# Patient Record
Sex: FEMALE | Race: WHITE | Employment: FULL TIME | URBAN - METROPOLITAN AREA
[De-identification: names, ages, dates, MRNs, and addresses within clinical notes are randomized per-mention and may not be internally consistent; named-entity substitution may affect disease eponyms.]

---

## 2017-08-07 ENCOUNTER — OFFICE VISIT (OUTPATIENT)
Dept: OBGYN CLINIC | Age: 27
End: 2017-08-07

## 2017-08-07 VITALS
BODY MASS INDEX: 34.16 KG/M2 | SYSTOLIC BLOOD PRESSURE: 108 MMHG | HEART RATE: 87 BPM | TEMPERATURE: 99.2 F | WEIGHT: 199 LBS | DIASTOLIC BLOOD PRESSURE: 62 MMHG

## 2017-08-07 DIAGNOSIS — R87.810 CERVICAL HIGH RISK HUMAN PAPILLOMAVIRUS (HPV) DNA TEST POSITIVE: ICD-10-CM

## 2017-08-07 DIAGNOSIS — E66.9 OBESITY (BMI 30-39.9): ICD-10-CM

## 2017-08-07 DIAGNOSIS — Z87.410 HISTORY OF CERVICAL DYSPLASIA: ICD-10-CM

## 2017-08-07 DIAGNOSIS — Z12.4 PAP SMEAR FOR CERVICAL CANCER SCREENING: ICD-10-CM

## 2017-08-07 DIAGNOSIS — Z97.5 IUD (INTRAUTERINE DEVICE) IN PLACE: ICD-10-CM

## 2017-08-07 DIAGNOSIS — Z01.419 WOMEN'S ANNUAL ROUTINE GYNECOLOGICAL EXAMINATION: Primary | ICD-10-CM

## 2017-08-07 PROCEDURE — 99395 PREV VISIT EST AGE 18-39: CPT | Performed by: OBSTETRICS & GYNECOLOGY

## 2017-08-07 RX ORDER — M-VIT,TX,IRON,MINS/CALC/FOLIC 27MG-0.4MG
1 TABLET ORAL DAILY
COMMUNITY
End: 2019-11-05

## 2017-08-07 ASSESSMENT — PATIENT HEALTH QUESTIONNAIRE - PHQ9
1. LITTLE INTEREST OR PLEASURE IN DOING THINGS: 0
2. FEELING DOWN, DEPRESSED OR HOPELESS: 0
SUM OF ALL RESPONSES TO PHQ QUESTIONS 1-9: 0
SUM OF ALL RESPONSES TO PHQ9 QUESTIONS 1 & 2: 0

## 2017-08-07 ASSESSMENT — ENCOUNTER SYMPTOMS
VOMITING: 0
SHORTNESS OF BREATH: 0
ABDOMINAL PAIN: 0
NAUSEA: 0
ABDOMINAL DISTENTION: 0
CONSTIPATION: 0
DIARRHEA: 0

## 2018-08-08 ENCOUNTER — OFFICE VISIT (OUTPATIENT)
Dept: OBGYN CLINIC | Age: 28
End: 2018-08-08

## 2018-08-08 VITALS
WEIGHT: 202.25 LBS | DIASTOLIC BLOOD PRESSURE: 72 MMHG | HEART RATE: 71 BPM | HEIGHT: 64 IN | TEMPERATURE: 98.1 F | BODY MASS INDEX: 34.53 KG/M2 | SYSTOLIC BLOOD PRESSURE: 118 MMHG

## 2018-08-08 DIAGNOSIS — R87.810 CERVICAL HIGH RISK HUMAN PAPILLOMAVIRUS (HPV) DNA TEST POSITIVE: ICD-10-CM

## 2018-08-08 DIAGNOSIS — Z87.410 HISTORY OF CERVICAL DYSPLASIA: ICD-10-CM

## 2018-08-08 DIAGNOSIS — E66.9 OBESITY (BMI 30-39.9): ICD-10-CM

## 2018-08-08 DIAGNOSIS — Z12.4 PAP SMEAR FOR CERVICAL CANCER SCREENING: ICD-10-CM

## 2018-08-08 DIAGNOSIS — Z97.5 IUD (INTRAUTERINE DEVICE) IN PLACE: ICD-10-CM

## 2018-08-08 DIAGNOSIS — Z01.419 WOMEN'S ANNUAL ROUTINE GYNECOLOGICAL EXAMINATION: Primary | ICD-10-CM

## 2018-08-08 PROCEDURE — 99395 PREV VISIT EST AGE 18-39: CPT | Performed by: OBSTETRICS & GYNECOLOGY

## 2018-08-08 ASSESSMENT — ENCOUNTER SYMPTOMS
VOMITING: 0
SHORTNESS OF BREATH: 0
DIARRHEA: 0
CONSTIPATION: 0
NAUSEA: 0
ABDOMINAL PAIN: 0
ABDOMINAL DISTENTION: 0

## 2018-08-08 NOTE — PROGRESS NOTES
Last PAP was normal; August/2017. Abnormal pap history?  yes  Last HPV screen: 2014 positive  Patient has never had a mammogram.  Last Dexa Scan: N/A   Contraceptive method: IUD mirena  No prior colonoscopy
Encounter   Procedures    PAP SMEAR     Follow up prn and 1 year.          Shae Liu, DO

## 2019-04-09 ENCOUNTER — TELEPHONE (OUTPATIENT)
Dept: ORTHOPEDIC SURGERY | Age: 29
End: 2019-04-09

## 2019-04-10 ENCOUNTER — TELEPHONE (OUTPATIENT)
Dept: ORTHOPEDIC SURGERY | Age: 29
End: 2019-04-10

## 2019-05-06 ENCOUNTER — OFFICE VISIT (OUTPATIENT)
Dept: FAMILY MEDICINE CLINIC | Age: 29
End: 2019-05-06
Payer: COMMERCIAL

## 2019-05-06 VITALS
TEMPERATURE: 98.4 F | HEART RATE: 72 BPM | DIASTOLIC BLOOD PRESSURE: 60 MMHG | WEIGHT: 201 LBS | BODY MASS INDEX: 34.23 KG/M2 | SYSTOLIC BLOOD PRESSURE: 102 MMHG

## 2019-05-06 DIAGNOSIS — Z00.00 PHYSICAL EXAM: Primary | ICD-10-CM

## 2019-05-06 LAB
A/G RATIO: 1.6 (ref 1.1–2.2)
ALBUMIN SERPL-MCNC: 4.4 G/DL (ref 3.4–5)
ALP BLD-CCNC: 74 U/L (ref 40–129)
ALT SERPL-CCNC: 9 U/L (ref 10–40)
ANION GAP SERPL CALCULATED.3IONS-SCNC: 15 MMOL/L (ref 3–16)
AST SERPL-CCNC: 13 U/L (ref 15–37)
BASOPHILS ABSOLUTE: 0 K/UL (ref 0–0.2)
BASOPHILS RELATIVE PERCENT: 0.7 %
BILIRUB SERPL-MCNC: 1.1 MG/DL (ref 0–1)
BUN BLDV-MCNC: 11 MG/DL (ref 7–20)
CALCIUM SERPL-MCNC: 9.5 MG/DL (ref 8.3–10.6)
CHLORIDE BLD-SCNC: 106 MMOL/L (ref 99–110)
CHOLESTEROL, TOTAL: 160 MG/DL (ref 0–199)
CO2: 21 MMOL/L (ref 21–32)
CREAT SERPL-MCNC: 0.7 MG/DL (ref 0.6–1.1)
EOSINOPHILS ABSOLUTE: 0.1 K/UL (ref 0–0.6)
EOSINOPHILS RELATIVE PERCENT: 1.9 %
GFR AFRICAN AMERICAN: >60
GFR NON-AFRICAN AMERICAN: >60
GLOBULIN: 2.7 G/DL
GLUCOSE BLD-MCNC: 93 MG/DL (ref 70–99)
HCT VFR BLD CALC: 41.9 % (ref 36–48)
HDLC SERPL-MCNC: 49 MG/DL (ref 40–60)
HEMOGLOBIN: 14 G/DL (ref 12–16)
LDL CHOLESTEROL CALCULATED: 96 MG/DL
LYMPHOCYTES ABSOLUTE: 2.2 K/UL (ref 1–5.1)
LYMPHOCYTES RELATIVE PERCENT: 44.9 %
MCH RBC QN AUTO: 31.4 PG (ref 26–34)
MCHC RBC AUTO-ENTMCNC: 33.5 G/DL (ref 31–36)
MCV RBC AUTO: 93.9 FL (ref 80–100)
MONOCYTES ABSOLUTE: 0.4 K/UL (ref 0–1.3)
MONOCYTES RELATIVE PERCENT: 7.8 %
NEUTROPHILS ABSOLUTE: 2.2 K/UL (ref 1.7–7.7)
NEUTROPHILS RELATIVE PERCENT: 44.7 %
PDW BLD-RTO: 12.9 % (ref 12.4–15.4)
PLATELET # BLD: 276 K/UL (ref 135–450)
PMV BLD AUTO: 9 FL (ref 5–10.5)
POTASSIUM SERPL-SCNC: 4.3 MMOL/L (ref 3.5–5.1)
RBC # BLD: 4.46 M/UL (ref 4–5.2)
SODIUM BLD-SCNC: 142 MMOL/L (ref 136–145)
TOTAL PROTEIN: 7.1 G/DL (ref 6.4–8.2)
TRIGL SERPL-MCNC: 77 MG/DL (ref 0–150)
VLDLC SERPL CALC-MCNC: 15 MG/DL
WBC # BLD: 4.9 K/UL (ref 4–11)

## 2019-05-06 PROCEDURE — 99385 PREV VISIT NEW AGE 18-39: CPT | Performed by: NURSE PRACTITIONER

## 2019-05-06 PROCEDURE — 36415 COLL VENOUS BLD VENIPUNCTURE: CPT | Performed by: NURSE PRACTITIONER

## 2019-05-06 ASSESSMENT — ENCOUNTER SYMPTOMS
SHORTNESS OF BREATH: 0
BACK PAIN: 0
ABDOMINAL DISTENTION: 0
BLOOD IN STOOL: 0
SORE THROAT: 0
WHEEZING: 0
CONSTIPATION: 0
DIARRHEA: 0

## 2019-05-06 ASSESSMENT — PATIENT HEALTH QUESTIONNAIRE - PHQ9
2. FEELING DOWN, DEPRESSED OR HOPELESS: 0
SUM OF ALL RESPONSES TO PHQ9 QUESTIONS 1 & 2: 0
SUM OF ALL RESPONSES TO PHQ QUESTIONS 1-9: 0
1. LITTLE INTEREST OR PLEASURE IN DOING THINGS: 0
SUM OF ALL RESPONSES TO PHQ QUESTIONS 1-9: 0
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
## 2019-08-12 ENCOUNTER — OFFICE VISIT (OUTPATIENT)
Dept: OBGYN CLINIC | Age: 29
End: 2019-08-12
Payer: COMMERCIAL

## 2019-08-12 VITALS
DIASTOLIC BLOOD PRESSURE: 74 MMHG | TEMPERATURE: 98.7 F | SYSTOLIC BLOOD PRESSURE: 122 MMHG | HEIGHT: 64 IN | HEART RATE: 74 BPM | WEIGHT: 199.8 LBS | BODY MASS INDEX: 34.11 KG/M2

## 2019-08-12 DIAGNOSIS — Z30.432 ENCOUNTER FOR IUD REMOVAL: ICD-10-CM

## 2019-08-12 DIAGNOSIS — E66.9 OBESITY (BMI 30-39.9): ICD-10-CM

## 2019-08-12 DIAGNOSIS — R87.810 CERVICAL HIGH RISK HUMAN PAPILLOMAVIRUS (HPV) DNA TEST POSITIVE: ICD-10-CM

## 2019-08-12 DIAGNOSIS — Z12.4 PAP SMEAR FOR CERVICAL CANCER SCREENING: ICD-10-CM

## 2019-08-12 DIAGNOSIS — Z97.5 IUD (INTRAUTERINE DEVICE) IN PLACE: ICD-10-CM

## 2019-08-12 DIAGNOSIS — Z01.419 WOMEN'S ANNUAL ROUTINE GYNECOLOGICAL EXAMINATION: Primary | ICD-10-CM

## 2019-08-12 DIAGNOSIS — Z87.410 HISTORY OF CERVICAL DYSPLASIA: ICD-10-CM

## 2019-08-12 PROCEDURE — 58301 REMOVE INTRAUTERINE DEVICE: CPT | Performed by: OBSTETRICS & GYNECOLOGY

## 2019-08-12 PROCEDURE — 99395 PREV VISIT EST AGE 18-39: CPT | Performed by: OBSTETRICS & GYNECOLOGY

## 2019-08-12 ASSESSMENT — ENCOUNTER SYMPTOMS
ABDOMINAL DISTENTION: 0
NAUSEA: 0
VOMITING: 0
CONSTIPATION: 0
SHORTNESS OF BREATH: 0
ABDOMINAL PAIN: 0
DIARRHEA: 0

## 2019-08-13 NOTE — PROGRESS NOTES
Last PAP was normal; August/2018. Abnormal pap history?  yes  Last HPV screen: 2014 positive  Patient has never had a mammogram.  Last Dexa Scan:  NA  Contraceptive method: IUD mirena  No prior colonoscopy
Cardiovascular: Normal rate, regular rhythm, S1 normal, S2 normal and normal heart sounds. Pulmonary/Chest: Effort normal and breath sounds normal. No respiratory distress. Right breast exhibits no inverted nipple, no mass, no nipple discharge, no skin change and no tenderness. Left breast exhibits no inverted nipple, no mass, no nipple discharge, no skin change and no tenderness. No breast tenderness, discharge or bleeding. Breasts are symmetrical.   Abdominal: Soft. Normal appearance and bowel sounds are normal. She exhibits no distension and no mass. There is no tenderness. There is no rigidity, no rebound and no guarding. Genitourinary: Uterus normal. No breast tenderness, discharge or bleeding. Pelvic exam was performed with patient supine. No labial fusion. There is no rash, tenderness, lesion or injury on the right labia. There is no rash, tenderness, lesion or injury on the left labia. Uterus is not tender. Cervix exhibits no motion tenderness, no discharge and no friability. Right adnexum displays no mass, no tenderness and no fullness. Left adnexum displays no mass, no tenderness and no fullness. No erythema, tenderness or bleeding in the vagina. No signs of injury around the vagina. No vaginal discharge found. Musculoskeletal: Normal range of motion. Neurological: She is alert and oriented to person, place, and time. Skin: Skin is warm, dry and intact. No rash noted. No cyanosis or erythema. Nails show no clubbing. Psychiatric: She has a normal mood and affect. Her speech is normal and behavior is normal. Judgment and thought content normal. Cognition and memory are normal.   Nursing note and vitals reviewed. IUD Removal (Procedure):  Patient taken to exam room, risks and benefits discussed for removal of IUD, written and informed consent obtained. Patient positioned in dorsal lithotomy position. Speculum placed. Pap smear was performed.   Upon performing pap smear, the IUD string was

## 2019-08-14 LAB
HPV COMMENT: NORMAL
HPV TYPE 16: NOT DETECTED
HPV TYPE 18: NOT DETECTED
HPVOH (OTHER TYPES): NOT DETECTED

## 2019-08-20 ENCOUNTER — TELEPHONE (OUTPATIENT)
Dept: OBGYN CLINIC | Age: 29
End: 2019-08-20

## 2019-08-20 NOTE — TELEPHONE ENCOUNTER
Received contraception device in office today. Added to medication book and scanned into chart order forms. Inform patient device is in office. Contact us on next day one of cycle (first day of period)  Please note any office visit copay on insurance card will be due at the time of service.

## 2019-09-30 ENCOUNTER — TELEPHONE (OUTPATIENT)
Dept: OBGYN CLINIC | Age: 29
End: 2019-09-30

## 2019-09-30 NOTE — TELEPHONE ENCOUNTER
Pt has first Amenorrhea appt scheduled for 10/14/19. She is calling because she has trouble sleeping. She does not want to take melatonin or Benadryl as she does like medications and they make her groggy. She wants to make sure that it is ok to take sleepy time tea.  Please advise

## 2019-10-14 ENCOUNTER — OFFICE VISIT (OUTPATIENT)
Dept: OBGYN CLINIC | Age: 29
End: 2019-10-14
Payer: COMMERCIAL

## 2019-10-14 VITALS
TEMPERATURE: 98.3 F | DIASTOLIC BLOOD PRESSURE: 70 MMHG | WEIGHT: 200.2 LBS | SYSTOLIC BLOOD PRESSURE: 102 MMHG | BODY MASS INDEX: 34.36 KG/M2 | HEART RATE: 63 BPM

## 2019-10-14 DIAGNOSIS — Z32.01 POSITIVE PREGNANCY TEST: ICD-10-CM

## 2019-10-14 DIAGNOSIS — O21.9 NAUSEA AND VOMITING IN PREGNANCY: ICD-10-CM

## 2019-10-14 DIAGNOSIS — Z87.410 HISTORY OF CERVICAL DYSPLASIA: ICD-10-CM

## 2019-10-14 DIAGNOSIS — N91.2 AMENORRHEA: Primary | ICD-10-CM

## 2019-10-14 DIAGNOSIS — E66.9 OBESITY (BMI 30-39.9): ICD-10-CM

## 2019-10-14 LAB
BILIRUBIN URINE: NEGATIVE
BLOOD, URINE: NEGATIVE
CLARITY: CLEAR
COLOR: YELLOW
CONTROL: ABNORMAL
CRL: NORMAL CM
GLUCOSE URINE: NEGATIVE MG/DL
KETONES, URINE: NEGATIVE MG/DL
LEUKOCYTE ESTERASE, URINE: NEGATIVE
MICROSCOPIC EXAMINATION: NORMAL
NITRITE, URINE: NEGATIVE
PH UA: 5.5 (ref 5–8)
PREGNANCY TEST URINE, POC: POSITIVE
PROTEIN UA: NEGATIVE MG/DL
SAC DIAMETER: NORMAL CM
SPECIFIC GRAVITY UA: 1.03 (ref 1–1.03)
URINE TYPE: NORMAL
UROBILINOGEN, URINE: 0.2 E.U./DL

## 2019-10-14 PROCEDURE — 81003 URINALYSIS AUTO W/O SCOPE: CPT | Performed by: OBSTETRICS & GYNECOLOGY

## 2019-10-14 PROCEDURE — 99214 OFFICE O/P EST MOD 30 MIN: CPT | Performed by: OBSTETRICS & GYNECOLOGY

## 2019-10-14 PROCEDURE — 81025 URINE PREGNANCY TEST: CPT | Performed by: OBSTETRICS & GYNECOLOGY

## 2019-10-14 PROCEDURE — 76801 OB US < 14 WKS SINGLE FETUS: CPT | Performed by: OBSTETRICS & GYNECOLOGY

## 2019-10-14 RX ORDER — ACETAMINOPHEN 325 MG/1
650 TABLET ORAL EVERY 6 HOURS PRN
Status: ON HOLD | COMMUNITY
End: 2020-05-23 | Stop reason: HOSPADM

## 2019-10-15 LAB
C TRACH DNA GENITAL QL NAA+PROBE: NEGATIVE
C. TRACHOMATIS, EXTERNAL RESULT: NEGATIVE
CANDIDA SPECIES, DNA PROBE: NORMAL
GARDNERELLA VAGINALIS, DNA PROBE: NORMAL
N. GONORRHOEAE DNA: NEGATIVE
N. GONORRHOEAE, EXTERNAL RESULT: NEGATIVE
TRICHOMONAS VAGINALIS DNA: NORMAL

## 2019-10-17 LAB
ORGANISM: ABNORMAL
URINE CULTURE, ROUTINE: ABNORMAL

## 2019-10-17 RX ORDER — NITROFURANTOIN 25; 75 MG/1; MG/1
100 CAPSULE ORAL 2 TIMES DAILY
Qty: 14 CAPSULE | Refills: 0 | Status: SHIPPED | OUTPATIENT
Start: 2019-10-17 | End: 2019-10-24

## 2019-10-27 ASSESSMENT — ENCOUNTER SYMPTOMS
VOMITING: 1
ABDOMINAL PAIN: 0
SHORTNESS OF BREATH: 0
CONSTIPATION: 0
DIARRHEA: 0
NAUSEA: 1
ABDOMINAL DISTENTION: 0

## 2019-10-31 ENCOUNTER — TELEPHONE (OUTPATIENT)
Dept: OBGYN CLINIC | Age: 29
End: 2019-10-31

## 2019-10-31 ENCOUNTER — NURSE ONLY (OUTPATIENT)
Dept: OBGYN CLINIC | Age: 29
End: 2019-10-31

## 2019-10-31 VITALS
TEMPERATURE: 97 F | SYSTOLIC BLOOD PRESSURE: 113 MMHG | HEART RATE: 67 BPM | WEIGHT: 200.8 LBS | DIASTOLIC BLOOD PRESSURE: 76 MMHG | BODY MASS INDEX: 34.47 KG/M2

## 2019-10-31 DIAGNOSIS — R30.0 DYSURIA: Primary | ICD-10-CM

## 2019-10-31 DIAGNOSIS — R30.9 PAINFUL URINATION: Primary | ICD-10-CM

## 2019-10-31 PROCEDURE — 81003 URINALYSIS AUTO W/O SCOPE: CPT | Performed by: OBSTETRICS & GYNECOLOGY

## 2019-11-01 LAB
BILIRUBIN URINE: NEGATIVE
BLOOD, URINE: NEGATIVE
CLARITY: ABNORMAL
COLOR: YELLOW
EPITHELIAL CELLS, UA: 1 /HPF (ref 0–5)
GLUCOSE URINE: NEGATIVE MG/DL
HYALINE CASTS: 8 /LPF (ref 0–8)
KETONES, URINE: NEGATIVE MG/DL
LEUKOCYTE ESTERASE, URINE: ABNORMAL
MICROSCOPIC EXAMINATION: YES
NITRITE, URINE: NEGATIVE
PH UA: 6.5 (ref 5–8)
PROTEIN UA: NEGATIVE MG/DL
RBC UA: 3 /HPF (ref 0–4)
SPECIFIC GRAVITY UA: 1.02 (ref 1–1.03)
URINE TYPE: ABNORMAL
UROBILINOGEN, URINE: 0.2 E.U./DL
WBC UA: 44 /HPF (ref 0–5)
YEAST: PRESENT /HPF

## 2019-11-02 LAB — URINE CULTURE, ROUTINE: NORMAL

## 2019-11-05 ENCOUNTER — INITIAL PRENATAL (OUTPATIENT)
Dept: OBGYN CLINIC | Age: 29
End: 2019-11-05
Payer: COMMERCIAL

## 2019-11-05 VITALS
SYSTOLIC BLOOD PRESSURE: 100 MMHG | DIASTOLIC BLOOD PRESSURE: 60 MMHG | WEIGHT: 199.2 LBS | BODY MASS INDEX: 34.19 KG/M2 | HEART RATE: 77 BPM

## 2019-11-05 DIAGNOSIS — Z87.410 HISTORY OF CERVICAL DYSPLASIA: ICD-10-CM

## 2019-11-05 DIAGNOSIS — E66.9 OBESITY (BMI 30-39.9): ICD-10-CM

## 2019-11-05 DIAGNOSIS — Z34.81 PRENATAL CARE, SUBSEQUENT PREGNANCY IN FIRST TRIMESTER: Primary | ICD-10-CM

## 2019-11-05 LAB
BASOPHILS ABSOLUTE: 0 K/UL (ref 0–0.2)
BASOPHILS RELATIVE PERCENT: 0.6 %
EOSINOPHILS ABSOLUTE: 0.1 K/UL (ref 0–0.6)
EOSINOPHILS RELATIVE PERCENT: 1.6 %
HCT VFR BLD CALC: 40.7 % (ref 36–48)
HEMOGLOBIN: 13.6 G/DL (ref 12–16)
LYMPHOCYTES ABSOLUTE: 1.5 K/UL (ref 1–5.1)
LYMPHOCYTES RELATIVE PERCENT: 25.1 %
MCH RBC QN AUTO: 31.3 PG (ref 26–34)
MCHC RBC AUTO-ENTMCNC: 33.4 G/DL (ref 31–36)
MCV RBC AUTO: 94 FL (ref 80–100)
MONOCYTES ABSOLUTE: 0.4 K/UL (ref 0–1.3)
MONOCYTES RELATIVE PERCENT: 5.7 %
NEUTROPHILS ABSOLUTE: 4.1 K/UL (ref 1.7–7.7)
NEUTROPHILS RELATIVE PERCENT: 67 %
PDW BLD-RTO: 12.9 % (ref 12.4–15.4)
PLATELET # BLD: 257 K/UL (ref 135–450)
PMV BLD AUTO: 9.2 FL (ref 5–10.5)
RBC # BLD: 4.34 M/UL (ref 4–5.2)
WBC # BLD: 6.1 K/UL (ref 4–11)

## 2019-11-05 PROCEDURE — 0502F SUBSEQUENT PRENATAL CARE: CPT | Performed by: OBSTETRICS & GYNECOLOGY

## 2019-11-05 PROCEDURE — 36415 COLL VENOUS BLD VENIPUNCTURE: CPT | Performed by: OBSTETRICS & GYNECOLOGY

## 2019-11-06 LAB
ABO, EXTERNAL RESULT: NORMAL
ABO/RH: NORMAL
AMPHETAMINE SCREEN, URINE: NORMAL
ANTIBODY SCREEN: NORMAL
BARBITURATE SCREEN URINE: NORMAL
BENZODIAZEPINE SCREEN, URINE: NORMAL
BUPRENORPHINE URINE: NORMAL
CANNABINOID SCREEN URINE: NORMAL
COCAINE METABOLITE SCREEN URINE: NORMAL
HEP B, EXTERNAL RESULT: NORMAL
HEPATITIS B SURFACE ANTIGEN INTERPRETATION: NORMAL
HIV AG/AB: NORMAL
HIV ANTIGEN: NORMAL
HIV, EXTERNAL RESULT: NORMAL
HIV-1 ANTIBODY: NORMAL
HIV-2 AB: NORMAL
Lab: NORMAL
METHADONE SCREEN, URINE: NORMAL
OPIATE SCREEN URINE: NORMAL
OXYCODONE URINE: NORMAL
PH UA: 6.5
PHENCYCLIDINE SCREEN URINE: NORMAL
PROPOXYPHENE SCREEN: NORMAL
RH FACTOR, EXTERNAL RESULT: POSITIVE
RPR, EXTERNAL RESULT: NORMAL
RUBELLA ANTIBODY IGG: 152.5 IU/ML
RUBELLA TITER, EXTERNAL RESULT: NORMAL
TOTAL SYPHILLIS IGG/IGM: NORMAL
TSH REFLEX: 0.56 UIU/ML (ref 0.27–4.2)

## 2019-11-07 ENCOUNTER — TELEPHONE (OUTPATIENT)
Dept: OBGYN CLINIC | Age: 29
End: 2019-11-07

## 2019-11-23 RX ORDER — PNV NO.121/IRON/FOLIC ACID 28MG-0.8MG
1 TABLET ORAL DAILY
Qty: 90 TABLET | Refills: 3 | Status: SHIPPED | OUTPATIENT
Start: 2019-11-23 | End: 2020-02-24 | Stop reason: SDUPTHER

## 2019-12-02 ENCOUNTER — OFFICE VISIT (OUTPATIENT)
Dept: OBGYN CLINIC | Age: 29
End: 2019-12-02
Payer: COMMERCIAL

## 2019-12-02 ENCOUNTER — ROUTINE PRENATAL (OUTPATIENT)
Dept: OBGYN CLINIC | Age: 29
End: 2019-12-02

## 2019-12-02 VITALS
SYSTOLIC BLOOD PRESSURE: 122 MMHG | HEART RATE: 105 BPM | DIASTOLIC BLOOD PRESSURE: 64 MMHG | BODY MASS INDEX: 34.4 KG/M2 | WEIGHT: 200.4 LBS

## 2019-12-02 DIAGNOSIS — Z98.890 HISTORY OF LOOP ELECTROSURGICAL EXCISION PROCEDURE (LEEP) OF CERVIX AFFECTING PREGNANCY IN SECOND TRIMESTER: Primary | ICD-10-CM

## 2019-12-02 DIAGNOSIS — Z87.410 HISTORY OF CERVICAL DYSPLASIA: ICD-10-CM

## 2019-12-02 DIAGNOSIS — Z98.890 HISTORY OF CERVICAL LEEP BIOPSY AFFECTING CARE OF MOTHER, ANTEPARTUM: ICD-10-CM

## 2019-12-02 DIAGNOSIS — Z34.82 PRENATAL CARE, SUBSEQUENT PREGNANCY IN SECOND TRIMESTER: Primary | ICD-10-CM

## 2019-12-02 DIAGNOSIS — Z34.81 PRENATAL CARE, SUBSEQUENT PREGNANCY IN FIRST TRIMESTER: ICD-10-CM

## 2019-12-02 DIAGNOSIS — E66.9 OBESITY (BMI 30-39.9): ICD-10-CM

## 2019-12-02 DIAGNOSIS — O34.40 HISTORY OF CERVICAL LEEP BIOPSY AFFECTING CARE OF MOTHER, ANTEPARTUM: ICD-10-CM

## 2019-12-02 DIAGNOSIS — O34.42 HISTORY OF LOOP ELECTROSURGICAL EXCISION PROCEDURE (LEEP) OF CERVIX AFFECTING PREGNANCY IN SECOND TRIMESTER: Primary | ICD-10-CM

## 2019-12-02 LAB
ABDOMINAL CIRCUMFERENCE: NORMAL
BIPARIETAL DIAMETER: NORMAL
ESTIMATED FETAL WEIGHT: NORMAL
FEMORAL DIAMETER: NORMAL
HC/AC: NORMAL
HEAD CIRCUMFERENCE: NORMAL

## 2019-12-02 PROCEDURE — 0502F SUBSEQUENT PRENATAL CARE: CPT | Performed by: OBSTETRICS & GYNECOLOGY

## 2019-12-02 PROCEDURE — 76817 TRANSVAGINAL US OBSTETRIC: CPT | Performed by: OBSTETRICS & GYNECOLOGY

## 2019-12-30 ENCOUNTER — OFFICE VISIT (OUTPATIENT)
Dept: OBGYN CLINIC | Age: 29
End: 2019-12-30
Payer: COMMERCIAL

## 2019-12-30 ENCOUNTER — ROUTINE PRENATAL (OUTPATIENT)
Dept: OBGYN CLINIC | Age: 29
End: 2019-12-30

## 2019-12-30 VITALS
BODY MASS INDEX: 35.19 KG/M2 | SYSTOLIC BLOOD PRESSURE: 128 MMHG | WEIGHT: 205 LBS | HEART RATE: 107 BPM | DIASTOLIC BLOOD PRESSURE: 64 MMHG

## 2019-12-30 PROCEDURE — 76805 OB US >/= 14 WKS SNGL FETUS: CPT | Performed by: OBSTETRICS & GYNECOLOGY

## 2019-12-30 PROCEDURE — 0502F SUBSEQUENT PRENATAL CARE: CPT | Performed by: OBSTETRICS & GYNECOLOGY

## 2020-01-06 NOTE — PROGRESS NOTES
Temp: 97.8 f oral    Maternal emotional well being screening form completed and reviewed with patient. Current score is 14. Patient given referral to [facility] ([phone]):  No
anatomy seen: The fetal genitalia is noted to be Female. IMPRESSION:  Single living IUP. No gross structural abnormalities are visualized. Amniotic fluid volume is subjectively normal.        The patient is well aware of the limitations of ultrasound in the detection of fetal anomalies. The scan is limited by fetal position. Diagnosis Orders   1. Prenatal care, subsequent pregnancy in second trimester     2. History of cervical LEEP biopsy affecting care of mother, antepartum     3. Obesity (BMI 30-39. 9)       Follow up prn and 4 weeks for prenatal visit

## 2020-01-27 ENCOUNTER — ROUTINE PRENATAL (OUTPATIENT)
Dept: OBGYN CLINIC | Age: 30
End: 2020-01-27

## 2020-01-27 ENCOUNTER — OFFICE VISIT (OUTPATIENT)
Dept: OBGYN CLINIC | Age: 30
End: 2020-01-27
Payer: COMMERCIAL

## 2020-01-27 VITALS
HEART RATE: 112 BPM | DIASTOLIC BLOOD PRESSURE: 64 MMHG | SYSTOLIC BLOOD PRESSURE: 126 MMHG | BODY MASS INDEX: 35.84 KG/M2 | WEIGHT: 208.8 LBS

## 2020-01-27 PROCEDURE — 76817 TRANSVAGINAL US OBSTETRIC: CPT | Performed by: OBSTETRICS & GYNECOLOGY

## 2020-01-27 PROCEDURE — 0502F SUBSEQUENT PRENATAL CARE: CPT | Performed by: OBSTETRICS & GYNECOLOGY

## 2020-02-24 ENCOUNTER — ROUTINE PRENATAL (OUTPATIENT)
Dept: OBGYN CLINIC | Age: 30
End: 2020-02-24
Payer: COMMERCIAL

## 2020-02-24 VITALS
WEIGHT: 213.2 LBS | BODY MASS INDEX: 36.6 KG/M2 | DIASTOLIC BLOOD PRESSURE: 66 MMHG | HEART RATE: 88 BPM | SYSTOLIC BLOOD PRESSURE: 104 MMHG

## 2020-02-24 PROCEDURE — 36415 COLL VENOUS BLD VENIPUNCTURE: CPT | Performed by: OBSTETRICS & GYNECOLOGY

## 2020-02-24 PROCEDURE — 0502F SUBSEQUENT PRENATAL CARE: CPT | Performed by: OBSTETRICS & GYNECOLOGY

## 2020-02-24 RX ORDER — PNV NO.121/IRON/FOLIC ACID 28MG-0.8MG
1 TABLET ORAL DAILY
Qty: 90 TABLET | Refills: 3 | Status: SHIPPED | OUTPATIENT
Start: 2020-02-24 | End: 2021-08-12

## 2020-02-25 LAB
ALT SERPL-CCNC: 24 U/L (ref 10–40)
AST SERPL-CCNC: 25 U/L (ref 15–37)
BASOPHILS ABSOLUTE: 0 K/UL (ref 0–0.2)
BASOPHILS RELATIVE PERCENT: 0.4 %
EOSINOPHILS ABSOLUTE: 0.1 K/UL (ref 0–0.6)
EOSINOPHILS RELATIVE PERCENT: 1.7 %
GLUCOSE CHALLENGE: 104 MG/DL
HCT VFR BLD CALC: 36.1 % (ref 36–48)
HEMOGLOBIN: 12 G/DL (ref 12–16)
LYMPHOCYTES ABSOLUTE: 1.7 K/UL (ref 1–5.1)
LYMPHOCYTES RELATIVE PERCENT: 23.9 %
MCH RBC QN AUTO: 32.1 PG (ref 26–34)
MCHC RBC AUTO-ENTMCNC: 33.4 G/DL (ref 31–36)
MCV RBC AUTO: 96.2 FL (ref 80–100)
MONOCYTES ABSOLUTE: 0.5 K/UL (ref 0–1.3)
MONOCYTES RELATIVE PERCENT: 7.4 %
NEUTROPHILS ABSOLUTE: 4.8 K/UL (ref 1.7–7.7)
NEUTROPHILS RELATIVE PERCENT: 66.6 %
PDW BLD-RTO: 14.2 % (ref 12.4–15.4)
PLATELET # BLD: 215 K/UL (ref 135–450)
PMV BLD AUTO: 9.2 FL (ref 5–10.5)
RBC # BLD: 3.75 M/UL (ref 4–5.2)
WBC # BLD: 7.2 K/UL (ref 4–11)

## 2020-03-11 ENCOUNTER — ROUTINE PRENATAL (OUTPATIENT)
Dept: OBGYN CLINIC | Age: 30
End: 2020-03-11

## 2020-03-11 VITALS
WEIGHT: 208.4 LBS | DIASTOLIC BLOOD PRESSURE: 62 MMHG | SYSTOLIC BLOOD PRESSURE: 104 MMHG | HEART RATE: 93 BPM | BODY MASS INDEX: 35.77 KG/M2

## 2020-03-11 PROCEDURE — 0502F SUBSEQUENT PRENATAL CARE: CPT | Performed by: OBSTETRICS & GYNECOLOGY

## 2020-03-11 RX ORDER — PSEUDOEPHEDRINE HYDROCHLORIDE 30 MG/1
30 TABLET ORAL EVERY 4 HOURS PRN
COMMUNITY
End: 2020-04-09

## 2020-03-11 NOTE — PROGRESS NOTES
Temp: 97.4 f oral    Maternal emotional well being screening form completed and reviewed with patient. Current score is 18. Patient given referral to [address] [place] [address] ([phone]):  No

## 2020-03-26 ENCOUNTER — ROUTINE PRENATAL (OUTPATIENT)
Dept: OBGYN CLINIC | Age: 30
End: 2020-03-26

## 2020-03-26 VITALS
TEMPERATURE: 97.6 F | HEART RATE: 94 BPM | SYSTOLIC BLOOD PRESSURE: 103 MMHG | DIASTOLIC BLOOD PRESSURE: 68 MMHG | WEIGHT: 215 LBS | BODY MASS INDEX: 36.9 KG/M2

## 2020-03-26 PROCEDURE — 0502F SUBSEQUENT PRENATAL CARE: CPT | Performed by: OBSTETRICS & GYNECOLOGY

## 2020-03-26 RX ORDER — CETIRIZINE HYDROCHLORIDE 10 MG/1
10 TABLET ORAL PRN
COMMUNITY
End: 2020-07-15 | Stop reason: ALTCHOICE

## 2020-03-26 NOTE — PROGRESS NOTES
Maternal emotional well being screening form completed and reviewed with patient. Current score is 15.    Patient given referral to [address] [place] [address] ([phone]): Yes

## 2020-04-08 ENCOUNTER — TELEPHONE (OUTPATIENT)
Dept: OBGYN CLINIC | Age: 30
End: 2020-04-08

## 2020-04-09 ENCOUNTER — ROUTINE PRENATAL (OUTPATIENT)
Dept: OBGYN CLINIC | Age: 30
End: 2020-04-09

## 2020-04-09 VITALS
DIASTOLIC BLOOD PRESSURE: 64 MMHG | SYSTOLIC BLOOD PRESSURE: 100 MMHG | HEART RATE: 107 BPM | TEMPERATURE: 98.1 F | BODY MASS INDEX: 36.97 KG/M2 | WEIGHT: 215.4 LBS

## 2020-04-09 PROBLEM — Z34.83 PRENATAL CARE, SUBSEQUENT PREGNANCY IN THIRD TRIMESTER: Status: ACTIVE | Noted: 2019-12-02

## 2020-04-09 PROCEDURE — 0502F SUBSEQUENT PRENATAL CARE: CPT | Performed by: OBSTETRICS & GYNECOLOGY

## 2020-04-09 RX ORDER — CALCIUM CARBONATE 200(500)MG
1 TABLET,CHEWABLE ORAL DAILY
COMMUNITY
End: 2020-07-15 | Stop reason: ALTCHOICE

## 2020-04-09 NOTE — PROGRESS NOTES
Return OB Office Visit    CC:   Chief Complaint   Patient presents with    Routine Prenatal Visit       HPI:  Pt seen and examined. Denies VB, LOF, ctx. +FM. Maternal wellness questionnaire reviewed - no concerns today. Score 16. States she has had a lot of stress recently due to being furloughed. 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 Also is getting more nervous about delivery, put her last baby up for adoption and is starting to get nervous about delivering again at [facility]. Denies SI/HI, but would be interested in speaking with someone about her anxieties and stress right now. Objective:  /64   Pulse 107   Temp 98.1 °F (36.7 °C) (Oral)   Wt 215 lb 6.4 oz (97.7 kg)   LMP 08/13/2019 Comment: spotting on 8/13 after IUD removal  Breastfeeding No   BMI 36.97 kg/m²   Gen: AO, NAD  Abd: Soft, NT  FHT: 150  FH: 35cm    Assessment/Plan:  31 y.o. [id number] at 34w2d (Estimated Date of Delivery: 5/19/20) presents for Sharath Leos 9038 appointment:     1. Prenatal care, subsequent pregnancy in third trimester     - Patient is doing well today without complaints     - Fetal wellbeing reassuring with FH and FHR today     - Return precautions reviewed     - Follow-up in 2 weeks for PIERRE visit    2. Obesity (BMI 30-39.9)     - Pre-pregnancy BMI 34     - TWG 15lbs     - Plan for growth scan at next visit    3.  Anxiety     - Increased anxiety due to work situation, stresses with COVID, and regarding delivery, referral placed today for [address] [place] [address]     - Maternal wellness questionnaire reviewed - score 16    Dispo: RTC in 2 weeks, GBS at that time  Ardith Sandifer, MD

## 2020-04-15 ENCOUNTER — TELEMEDICINE (OUTPATIENT)
Dept: PSYCHOLOGY | Age: 30
End: 2020-04-15
Payer: COMMERCIAL

## 2020-04-15 PROCEDURE — 90791 PSYCH DIAGNOSTIC EVALUATION: CPT | Performed by: SOCIAL WORKER

## 2020-04-15 NOTE — PATIENT INSTRUCTIONS
1.  Review the handout below on constructive worry  2. Try this activity every night  3. Return to see Kelly Bishop in 1 weeks. Constructive Worry Worksheet  [employer]     1. ________________________                        2.  ______________________                        3.  _______________________     1. __________________________        2. __________________________        3. __________________________        1.  _________________________        2. __________________________        3. __________________________        1. __________________________        2. __________________________        3.  __________________________           Constructive Worry Instructions  When we have challenges, we tend to use our problem-solving skills to make our lives better and to relieve ourselves of anxiety. It is not surprising that some of us may use our problem-solving skills at the wrong time and place, namely bedtime. We may think about a problem, trying to solve it, but unfortunately this will oftentimes keep us awake. Constructive worry is a method for managing the tendency to worry during that quiet time when sleep is supposed to be taking over. Do this exercise early in the evening (at least 2 hours before bed). It should take only about 15 minutes to complete. Here is how it is done:  1. Write down the problem(s) facing you that has the greatest chance of keeping you awake a bedtime, and list them in the Concerns column of the [address]. 2. Then, think of the next step that might help fix it. Write it down in the Solutions column. This need not be the final solution to the problem, since most problems have to be solved by taking steps anyhow, and you will be doing this again tomorrow night and the night after until you finally get to the best solution.  If you know how to fix the problem completely, then write that down.     If you decide that this is not really a big problem, and you will just deal with it when the time comes, then write that down.  If you decide that you simply do not know what to do about it, and need to ask someone to help you, write that down.  If you decide that it is a problem, but there seems to be no good solution at all, and that you will just have to live with it, write that down, with a note to yourself that maybe sometime soon you or someone you speak with will give you a clue that will lead you to a solution. 3. Repeat this for any other concerns you have. 4. Fold the Constructive Worry Worksheet in half and place it on the nightstand next to your bed and forget about it until bedtime.       5. At bedtime, if you begin to worry actually tell yourself that you have dealt with your problems already in the best way you know how, and when you were at your problem-solving best.  Remind yourself that you will be working on them again tomorrow evening and that nothing you can do while you are so tired can help you any more than what you have already done; more effort will only make the matters worst.

## 2020-04-15 NOTE — PROGRESS NOTES
Patient's emergency contact's name and number, as well as permission to contact them if needed: Extended Emergency Contact Information  Primary Emergency Contact: Kamille Brink, [address] Phone: [phone]  Relation: Domestic Partner  Secondary Emergency Contact: Rizwan Emerson  Address: [place] [address] [place] [address] [place] of [address] Phone: [phone]  Mobile Phone: [phone]  Relation: Parent     Provider location: [address]:  Pt endorses that she is struggling with anxiety and depression. She is 35 weeks pregnant, and is feeling anxious in general however with the pandemic she is especially stressed. In 2011 pt had a daughter and had a son in 2009. Son's father was involved in 2009, however patient gave her daughter up for adoption. She was born at [facility] and this is where she will be delivering her daughter again so is concerned the emotional side of this. 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 This is an added stressor. She thnks they will be okay financially. No etoh or drug use. Denied past or present SI.   No mental health tx in past.          O:  MSE:    Appearance: good hygiene   Attitude: cooperative and friendly  Consciousness: alert  Orientation: oriented to person, place, time, general circumstance  Memory: recent and remote memory intact  Attention/Concentration: intact during session  Psychomotor Activity:normal  Eye Contact: normal  Speech: normal rate and volume, well-articulated  Mood: anxious  Affect: euthymic  Perception: within normal limits  Thought Content: within normal limits  Thought Process: logical, coherent and goal-directed  Insight: good  Judgment: intact  Ability to understand instructions: Yes  Ability to respond meaningfully: Yes  Morbid Ideation: no   Suicide Assessment: no suicidal ideation, plan, or intent  Homicidal Ideation: no    History:    Medications:   Current Outpatient Medications   Medication Sig Dispense Refill    calcium carbonate (TUMS) 500 MG chewable tablet Take 1 tablet by mouth daily      cetirizine (ZYRTEC) 10 MG tablet Take 10 mg by mouth daily      Prenatal Vit-Fe Fumarate-FA ( PRENATAL MULTIVITAMINS) 28-0.8 MG TABS Take 1 tablet by mouth daily 90 tablet 3    acetaminophen (TYLENOL) 325 MG tablet Take 650 mg by mouth every 6 hours as needed for Pain       No current facility-administered medications for this visit.       Social History:   Social History     Socioeconomic History    Marital status: Single     Spouse name: Not on file    Number of children: Not on file    Years of education: Not on file    Highest education level: Not on file   Occupational History    Occupation: RN   Social Needs    Financial resource strain: Not on file    Food insecurity     Worry: Not on file     Inability: Not on file   [employer] needs     Medical: Not on file     Non-medical: Not on file   Tobacco Use    Smoking status: Never Smoker    Smokeless tobacco: Never Used   Substance and Sexual Activity    Alcohol use: Not Currently     Alcohol/week: 0.0 standard drinks     Comment: FEW TIMES PER MONTH    Drug use: No    Sexual activity: Yes     Partners: Male   Lifestyle    Physical activity     Days per week: Not on file     Minutes per session: Not on file    Stress: Not on file   Relationships    Social connections     Talks on phone: Not on file     Gets together: Not on file     Attends Anabaptist service: Not on file     Active member of club or organization: Not on file     Attends meetings of clubs or organizations: Not on file     Relationship status: Not on file    Intimate partner violence     Fear of current or ex partner: Not on file     Emotionally abused: Not on file     Physically abused: Not on file     Forced sexual activity: Not on file   Other Topics Concern    Not on file   Social History Narrative    Not on file     TOBACCO:

## 2020-04-20 ENCOUNTER — VIRTUAL VISIT (OUTPATIENT)
Dept: PSYCHOLOGY | Age: 30
End: 2020-04-20
Payer: COMMERCIAL

## 2020-04-20 PROCEDURE — 90832 PSYTX W PT 30 MINUTES: CPT | Performed by: SOCIAL WORKER

## 2020-04-20 NOTE — PROGRESS NOTES
History    Marital status: Single     Spouse name: Not on file    Number of children: Not on file    Years of education: Not on file    Highest education level: Not on file   Occupational History    Occupation: RN   Social Needs    Financial resource strain: Not on file    Food insecurity     Worry: Not on file     Inability: Not on file   [employer] needs     Medical: Not on file     Non-medical: Not on file   Tobacco Use    Smoking status: Never Smoker    Smokeless tobacco: Never Used   Substance and Sexual Activity    Alcohol use: Not Currently     Alcohol/week: 0.0 standard drinks     Comment: FEW TIMES PER MONTH    Drug use: No    Sexual activity: Yes     Partners: Male   Lifestyle    Physical activity     Days per week: Not on file     Minutes per session: Not on file    Stress: Not on file   Relationships    Social connections     Talks on phone: Not on file     Gets together: Not on file     Attends Sabianism service: Not on file     Active member of club or organization: Not on file     Attends meetings of clubs or organizations: Not on file     Relationship status: Not on file    Intimate partner violence     Fear of current or ex partner: Not on file     Emotionally abused: Not on file     Physically abused: Not on file     Forced sexual activity: Not on file   Other Topics Concern    Not on file   Social History Narrative    Not on file     TOBACCO:   reports that she has never smoked. She has never used smokeless tobacco.  ETOH:   reports previous alcohol use.   Family History:   Family History   Problem Relation Age of Onset    Diabetes Father     High Blood Pressure Father     High Cholesterol Father     Cancer Maternal Aunt         breast    Uterine Cancer Maternal Aunt     Cancer Maternal Aunt 79        breast    No Known Problems Paternal Grandfather     Heart Attack Paternal Grandmother     Stroke Maternal Grandmother     No Known Problems Maternal Grandfather     Depression Mother     High Blood Pressure Mother     Other Brother         severe seasonal allergies       A:  Pt's mood improving. No SI/HI, insight and motivation good. Diagnosis:    1. DEMETRICE (generalized anxiety disorder)    2.  Adjustment disorder with mixed anxiety and depressed mood          Diagnosis Date    Abnormal Pap smear of cervix     Hydronephrosis 6/26/2013    Obesity (BMI 30.0-34.9) 6/16/2014    Renal stone 6/26/2013    Varicella      Plan:  Pt interventions:  Provided handout on  acceptance, Trained in strategies for increasing balanced thinking, Discussed and set plan for behavioral activation, Trained in relaxation strategies, Discussed self-care (sleep, nutrition, rewarding activities, social support, exercise), Motivational Interviewing to increase patient confidence and compliance with adhering to behavioral change plan, Motivational Interviewing to determine importance and readiness for change, Supportive techniques and Emphasized self-care as important for managing overall health    Pt Behavioral Change Plan:   See Pt Instructions
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
## 2020-04-22 ENCOUNTER — TELEPHONE (OUTPATIENT)
Dept: OBGYN CLINIC | Age: 30
End: 2020-04-22

## 2020-04-23 ENCOUNTER — OFFICE VISIT (OUTPATIENT)
Dept: OBGYN CLINIC | Age: 30
End: 2020-04-23
Payer: COMMERCIAL

## 2020-04-23 ENCOUNTER — ROUTINE PRENATAL (OUTPATIENT)
Dept: OBGYN CLINIC | Age: 30
End: 2020-04-23

## 2020-04-23 VITALS
WEIGHT: 216 LBS | DIASTOLIC BLOOD PRESSURE: 66 MMHG | HEART RATE: 103 BPM | SYSTOLIC BLOOD PRESSURE: 98 MMHG | BODY MASS INDEX: 37.08 KG/M2

## 2020-04-23 PROCEDURE — 0502F SUBSEQUENT PRENATAL CARE: CPT | Performed by: OBSTETRICS & GYNECOLOGY

## 2020-04-23 PROCEDURE — 76816 OB US FOLLOW-UP PER FETUS: CPT | Performed by: OBSTETRICS & GYNECOLOGY

## 2020-04-23 RX ORDER — FAMOTIDINE 20 MG/1
20 TABLET, FILM COATED ORAL NIGHTLY PRN
COMMUNITY
End: 2020-07-15 | Stop reason: ALTCHOICE

## 2020-04-23 NOTE — PROGRESS NOTES
Oral temp: 98.4    Maternal emotional well being screening form completed and reviewed with patient. Current score is 12. Patient given referral to [address] [place] [address] ([phone]):  No

## 2020-04-25 LAB
GBS, EXTERNAL RESULT: POSITIVE
GROUP B STREP CULTURE: ABNORMAL
ORGANISM: ABNORMAL

## 2020-04-27 ENCOUNTER — VIRTUAL VISIT (OUTPATIENT)
Dept: PSYCHOLOGY | Age: 30
End: 2020-04-27
Payer: COMMERCIAL

## 2020-04-27 PROCEDURE — 90832 PSYTX W PT 30 MINUTES: CPT | Performed by: SOCIAL WORKER

## 2020-04-27 NOTE — PROGRESS NOTES
No vaginal bleeding, no LOF, no regular contractions, +FM  Seeing Jenni Staffrod for counseling  1/50/-3 vertex  Maternal Wellness Questionnaire reviewed--no concerns--seeing counselor. DATE: 4/23/20    PHYSICIAN: JARETH Liu D.O.     SONOGRAPHER: Rosalind Ronquillo [facility]    INDICATION: Growth    COMPARISON: 1/27/2020    TYPE OF SCAN: abdominal    FINDINGS:  A single viable intrauterine pregnancy is noted in cephalic presentation. Cardiac and somatic activity are noted. The following values were obtained:   Fetal heart rate    157 bpm   BPD      8.84cm  35 weeks 5 day(s)   Head Circumference    32.85cm  37 weeks 2 day(s)    Abdominal Circumference   35.10cm  39 weeks 0 day(s)   Femur Length     7.56cm  35 weeks 5 day(s)   Humerus Length    6.41cm  37 weeks 1 day(s)   Amniotic fluid index    19.75cm   EFW      3456g  93.6 percentile    Amniotic fluid volume is normal. Based on sonographic criteria the estimated fetal age is 37 weeks and 4 days with EDC of 5/10/20. There is a 9 day discordance with the established EDC of 5/19/20. The evaluation of the lower uterine segment and cervix reveals normal appearing anatomy. The uterus is unremarkable/gravid. Maternal ovaries and adnexae are not well visualized due to the size of the uterus and patient's gravid state. Anatomy seen includes: heart, stomach, kidneys, bladder    IMPRESSION:  Single live IUP in the third trimester. Adequate interval fetal growth. Imaging is limited secondary to fetal position. The patient is well aware of the limitations of ultrasound in the detection of anomalies. Diagnosis Orders   1. Prenatal care, subsequent pregnancy in third trimester  Culture, Strep B Screen, Vaginal/Rectal   2. History of cervical LEEP biopsy affecting care of mother, antepartum     3. Obesity (BMI 30-39.9)     4.  History of cervical dysplasia       Orders Placed This Encounter   Procedures    Culture, Strep B Screen, Vaginal/Rectal     Labor precautions, kick

## 2020-04-27 NOTE — PATIENT INSTRUCTIONS
tension, and overall feelings of relaxation. Why Be Concerned With How Im Breathing?  To increase your awareness of the role that breathing plays in increased physical tension and in contributing to increasing your bodys stress response.  To lower your level of stress-related arousal and tension.  To give you a method of taking calm, relaxing breaths to break the cycle of increasing arousal during stressful situations. What Is the Best Way To Use Deep Breathing Exercises?  Use deep breathing frequently.  Take deep breaths at the first signs of stress, anxiety, physical tension, or other symptoms.  Schedule time for relaxation. Deep Breathing       What Is Deep Breathing? Deep breathing involves using your diaphragm muscle to help bring about a state of physiological relaxation. The diaphragm is a large muscle that rests across the bottom of your rib cage. When you inhale, the diaphragm muscle drops, opening up space so air can come in. When watching someone do this it looks like your stomach is filling with air. This type of breathing helps activate the part of your nervous system that controls relaxation. It can lead to decreased heart rate, blood pressure, decreased muscle tension, and overall feelings of relaxation. Why Be Concerned With How Im Breathing?  To increase your awareness of the role that breathing plays in increased physical tension and in contributing to increasing your bodys stress response.  To lower your level of stress-related arousal and tension.  To give you a method of taking calm, relaxing breaths to break the cycle of increasing arousal during stressful situations. What Is the Best Way To Use Deep Breathing Exercises?  Use deep breathing frequently.  Take deep breaths at the first signs of stress, anxiety, physical tension, or other symptoms.  Schedule time for relaxation.      Deep Breathing Exercises      Exercise 1:  The Stimulating Breath (also called the Ahsan Breath)   Its aim is to raise energy level and increase alertness. - Inhale and exhale rapidly through your nose, keeping your mouth closed but relaxed. Your breaths in and out should be equal in duration, but as short as possible. This is a noisy breathing exercise. - Try for three in-and-out breath cycles per second. This produces a quick movement of the diaphragm, suggesting a ahsan. Breathe normally after each cycle. - Do not do for more than 15 seconds on your first try. Each time you practice the Stimulating Breath, you can increase your time by five seconds or so, until you reach a full minute. If done properly, you may feel invigorated, comparable to the heightened awareness you feel after a good workout. You should feel the effort at the back of the neck, the diaphragm, the chest and the abdomen. Try this breathing exercise the next time you need an energy boost and feel yourself reaching for a cup of coffee. Exercise 2:  The 4-7-8 (or Relaxing Breath) Exercise  This exercise is utterly simple, takes almost no time, requires no equipment and can be done anywhere. Although you can do the exercise in any position, sit with your back straight while learning the exercise. Place the tip of your tongue against the ridge of tissue just behind your upper front teeth, and keep it there through the entire exercise. You will be exhaling through your mouth around your tongue; try pursing your lips slightly if this seems awkward.  Exhale completely through your mouth, making a whoosh sound.  Close your mouth and inhale quietly through your nose to a mental count of four.  Hold your breath for a count of seven.  Exhale completely through your mouth, making a whoosh sound to a count of eight.  This is one breath. Now inhale again and repeat the cycle three more times for a total of four breaths.   Note that you always inhale quietly through your nose and exhale

## 2020-04-28 ENCOUNTER — ROUTINE PRENATAL (OUTPATIENT)
Dept: OBGYN CLINIC | Age: 30
End: 2020-04-28

## 2020-04-28 ENCOUNTER — TELEPHONE (OUTPATIENT)
Dept: OBGYN CLINIC | Age: 30
End: 2020-04-28

## 2020-04-28 VITALS
BODY MASS INDEX: 37.21 KG/M2 | SYSTOLIC BLOOD PRESSURE: 98 MMHG | WEIGHT: 216.8 LBS | HEART RATE: 102 BPM | DIASTOLIC BLOOD PRESSURE: 60 MMHG

## 2020-04-28 PROCEDURE — 0502F SUBSEQUENT PRENATAL CARE: CPT | Performed by: OBSTETRICS & GYNECOLOGY

## 2020-04-30 PROBLEM — O99.820 GROUP B STREPTOCOCCUS CARRIER, +RV CULTURE, CURRENTLY PREGNANT: Status: ACTIVE | Noted: 2020-04-30

## 2020-05-06 ENCOUNTER — ROUTINE PRENATAL (OUTPATIENT)
Dept: OBGYN CLINIC | Age: 30
End: 2020-05-06

## 2020-05-06 VITALS
WEIGHT: 217.4 LBS | DIASTOLIC BLOOD PRESSURE: 68 MMHG | SYSTOLIC BLOOD PRESSURE: 102 MMHG | HEART RATE: 99 BPM | BODY MASS INDEX: 37.32 KG/M2

## 2020-05-06 PROCEDURE — 0502F SUBSEQUENT PRENATAL CARE: CPT | Performed by: OBSTETRICS & GYNECOLOGY

## 2020-05-11 ENCOUNTER — VIRTUAL VISIT (OUTPATIENT)
Dept: PSYCHOLOGY | Age: 30
End: 2020-05-11
Payer: COMMERCIAL

## 2020-05-11 PROCEDURE — 90832 PSYTX W PT 30 MINUTES: CPT | Performed by: SOCIAL WORKER

## 2020-05-12 ENCOUNTER — ROUTINE PRENATAL (OUTPATIENT)
Dept: OBGYN CLINIC | Age: 30
End: 2020-05-12

## 2020-05-12 VITALS
HEART RATE: 108 BPM | BODY MASS INDEX: 37.08 KG/M2 | DIASTOLIC BLOOD PRESSURE: 64 MMHG | SYSTOLIC BLOOD PRESSURE: 102 MMHG | WEIGHT: 216 LBS

## 2020-05-12 PROCEDURE — 0502F SUBSEQUENT PRENATAL CARE: CPT | Performed by: OBSTETRICS & GYNECOLOGY

## 2020-05-13 LAB — SARS-COV-2: NOT DETECTED

## 2020-05-18 ENCOUNTER — ROUTINE PRENATAL (OUTPATIENT)
Dept: OBGYN CLINIC | Age: 30
End: 2020-05-18

## 2020-05-18 VITALS
DIASTOLIC BLOOD PRESSURE: 64 MMHG | HEART RATE: 99 BPM | SYSTOLIC BLOOD PRESSURE: 104 MMHG | WEIGHT: 218.2 LBS | BODY MASS INDEX: 37.45 KG/M2

## 2020-05-18 PROCEDURE — 0502F SUBSEQUENT PRENATAL CARE: CPT | Performed by: OBSTETRICS & GYNECOLOGY

## 2020-05-18 NOTE — PROGRESS NOTES
No vaginal bleeding, no LOF, no regular contractions, +FM  No complaints. 1/80/-2 vertex. Maternal Wellness Questionnaire reviewed--no concerns. Diagnosis Orders   1. Prenatal care, subsequent pregnancy in third trimester     2. History of cervical LEEP biopsy affecting care of mother, antepartum     3. Group B Streptococcus carrier, +RV culture, currently pregnant     4. Obesity (BMI 30-39. 9)       Labor precautions, kick counts  Follow up prn and 1 week for prenatal visit.

## 2020-05-22 ENCOUNTER — ANESTHESIA (OUTPATIENT)
Dept: LABOR AND DELIVERY | Age: 30
End: 2020-05-22
Payer: COMMERCIAL

## 2020-05-22 ENCOUNTER — ANESTHESIA EVENT (OUTPATIENT)
Dept: LABOR AND DELIVERY | Age: 30
End: 2020-05-22
Payer: COMMERCIAL

## 2020-05-22 ENCOUNTER — HOSPITAL ENCOUNTER (INPATIENT)
Age: 30
LOS: 2 days | Discharge: HOME | End: 2020-05-24
Attending: OBSTETRICS & GYNECOLOGY | Admitting: OBSTETRICS & GYNECOLOGY
Payer: COMMERCIAL

## 2020-05-22 PROBLEM — Z37.9 NORMAL LABOR: Status: ACTIVE | Noted: 2020-05-22

## 2020-05-22 LAB
ABO/RH: NORMAL
AMPHETAMINE SCREEN, URINE: NORMAL
ANTIBODY SCREEN: NORMAL
BARBITURATE SCREEN URINE: NORMAL
BASOPHILS ABSOLUTE: 0.1 K/UL (ref 0–0.2)
BASOPHILS RELATIVE PERCENT: 0.5 %
BENZODIAZEPINE SCREEN, URINE: NORMAL
BUPRENORPHINE URINE: NORMAL
CANNABINOID SCREEN URINE: NORMAL
COCAINE METABOLITE SCREEN URINE: NORMAL
EOSINOPHILS ABSOLUTE: 0.3 K/UL (ref 0–0.6)
EOSINOPHILS RELATIVE PERCENT: 2.1 %
HCT VFR BLD CALC: 42.8 % (ref 36–48)
HEMOGLOBIN: 14.3 G/DL (ref 12–16)
LYMPHOCYTES ABSOLUTE: 3.6 K/UL (ref 1–5.1)
LYMPHOCYTES RELATIVE PERCENT: 29.1 %
Lab: NORMAL
MCH RBC QN AUTO: 30.6 PG (ref 26–34)
MCHC RBC AUTO-ENTMCNC: 33.6 G/DL (ref 31–36)
MCV RBC AUTO: 91.2 FL (ref 80–100)
METHADONE SCREEN, URINE: NORMAL
MONOCYTES ABSOLUTE: 1 K/UL (ref 0–1.3)
MONOCYTES RELATIVE PERCENT: 8.2 %
NEUTROPHILS ABSOLUTE: 7.4 K/UL (ref 1.7–7.7)
NEUTROPHILS RELATIVE PERCENT: 60.1 %
OPIATE SCREEN URINE: NORMAL
OXYCODONE URINE: NORMAL
PDW BLD-RTO: 14 % (ref 12.4–15.4)
PH UA: 7
PHENCYCLIDINE SCREEN URINE: NORMAL
PLATELET # BLD: 237 K/UL (ref 135–450)
PMV BLD AUTO: 9.7 FL (ref 5–10.5)
PROPOXYPHENE SCREEN: NORMAL
RBC # BLD: 4.69 M/UL (ref 4–5.2)
TOTAL SYPHILLIS IGG/IGM: NORMAL
WBC # BLD: 12.3 K/UL (ref 4–11)

## 2020-05-22 PROCEDURE — 2500000003 HC RX 250 WO HCPCS: Performed by: NURSE ANESTHETIST, CERTIFIED REGISTERED

## 2020-05-22 PROCEDURE — 2580000003 HC RX 258

## 2020-05-22 PROCEDURE — 86901 BLOOD TYPING SEROLOGIC RH(D): CPT

## 2020-05-22 PROCEDURE — 85025 COMPLETE CBC W/AUTO DIFF WBC: CPT

## 2020-05-22 PROCEDURE — 6360000002 HC RX W HCPCS: Performed by: OBSTETRICS & GYNECOLOGY

## 2020-05-22 PROCEDURE — 86850 RBC ANTIBODY SCREEN: CPT

## 2020-05-22 PROCEDURE — 86900 BLOOD TYPING SEROLOGIC ABO: CPT

## 2020-05-22 PROCEDURE — 6370000000 HC RX 637 (ALT 250 FOR IP): Performed by: OBSTETRICS & GYNECOLOGY

## 2020-05-22 PROCEDURE — 80307 DRUG TEST PRSMV CHEM ANLYZR: CPT

## 2020-05-22 PROCEDURE — 1220000000 HC SEMI PRIVATE OB R&B

## 2020-05-22 PROCEDURE — 0KQM0ZZ REPAIR PERINEUM MUSCLE, OPEN APPROACH: ICD-10-PCS | Performed by: OBSTETRICS & GYNECOLOGY

## 2020-05-22 PROCEDURE — 7200000001 HC VAGINAL DELIVERY

## 2020-05-22 PROCEDURE — 59400 OBSTETRICAL CARE: CPT | Performed by: OBSTETRICS & GYNECOLOGY

## 2020-05-22 PROCEDURE — 86780 TREPONEMA PALLIDUM: CPT

## 2020-05-22 PROCEDURE — 2580000003 HC RX 258: Performed by: OBSTETRICS & GYNECOLOGY

## 2020-05-22 PROCEDURE — 3700000025 EPIDURAL BLOCK: Performed by: ANESTHESIOLOGY

## 2020-05-22 RX ORDER — SODIUM CHLORIDE, SODIUM LACTATE, POTASSIUM CHLORIDE, CALCIUM CHLORIDE 600; 310; 30; 20 MG/100ML; MG/100ML; MG/100ML; MG/100ML
INJECTION, SOLUTION INTRAVENOUS CONTINUOUS
Status: DISCONTINUED | OUTPATIENT
Start: 2020-05-22 | End: 2020-05-24 | Stop reason: HOSPADM

## 2020-05-22 RX ORDER — LANOLIN 100 %
OINTMENT (GRAM) TOPICAL PRN
Status: DISCONTINUED | OUTPATIENT
Start: 2020-05-22 | End: 2020-05-24 | Stop reason: HOSPADM

## 2020-05-22 RX ORDER — ACETAMINOPHEN 325 MG/1
650 TABLET ORAL EVERY 4 HOURS PRN
Status: DISCONTINUED | OUTPATIENT
Start: 2020-05-22 | End: 2020-05-22

## 2020-05-22 RX ORDER — SODIUM CHLORIDE 0.9 % (FLUSH) 0.9 %
10 SYRINGE (ML) INJECTION EVERY 12 HOURS SCHEDULED
Status: DISCONTINUED | OUTPATIENT
Start: 2020-05-22 | End: 2020-05-22

## 2020-05-22 RX ORDER — SODIUM CHLORIDE 0.9 % (FLUSH) 0.9 %
10 SYRINGE (ML) INJECTION PRN
Status: DISCONTINUED | OUTPATIENT
Start: 2020-05-22 | End: 2020-05-22

## 2020-05-22 RX ORDER — CETIRIZINE HYDROCHLORIDE 10 MG/1
10 TABLET ORAL DAILY
Status: DISCONTINUED | OUTPATIENT
Start: 2020-05-22 | End: 2020-05-22

## 2020-05-22 RX ORDER — SODIUM CHLORIDE, SODIUM LACTATE, POTASSIUM CHLORIDE, CALCIUM CHLORIDE 600; 310; 30; 20 MG/100ML; MG/100ML; MG/100ML; MG/100ML
INJECTION, SOLUTION INTRAVENOUS CONTINUOUS
Status: DISCONTINUED | OUTPATIENT
Start: 2020-05-22 | End: 2020-05-22

## 2020-05-22 RX ORDER — FAMOTIDINE 20 MG/1
20 TABLET, FILM COATED ORAL NIGHTLY PRN
Status: DISCONTINUED | OUTPATIENT
Start: 2020-05-22 | End: 2020-05-22

## 2020-05-22 RX ORDER — DOCUSATE SODIUM 100 MG/1
100 CAPSULE, LIQUID FILLED ORAL 2 TIMES DAILY
Status: DISCONTINUED | OUTPATIENT
Start: 2020-05-22 | End: 2020-05-22

## 2020-05-22 RX ORDER — NALBUPHINE HCL 10 MG/ML
10 AMPUL (ML) INJECTION
Status: DISCONTINUED | OUTPATIENT
Start: 2020-05-22 | End: 2020-05-22

## 2020-05-22 RX ORDER — BUPIVACAINE HYDROCHLORIDE 2.5 MG/ML
INJECTION, SOLUTION EPIDURAL; INFILTRATION; INTRACAUDAL PRN
Status: DISCONTINUED | OUTPATIENT
Start: 2020-05-22 | End: 2020-05-22 | Stop reason: SDUPTHER

## 2020-05-22 RX ORDER — DOCUSATE SODIUM 100 MG/1
100 CAPSULE, LIQUID FILLED ORAL 2 TIMES DAILY
Status: DISCONTINUED | OUTPATIENT
Start: 2020-05-22 | End: 2020-05-24 | Stop reason: HOSPADM

## 2020-05-22 RX ORDER — SODIUM CHLORIDE 0.9 % (FLUSH) 0.9 %
10 SYRINGE (ML) INJECTION PRN
Status: DISCONTINUED | OUTPATIENT
Start: 2020-05-22 | End: 2020-05-24 | Stop reason: HOSPADM

## 2020-05-22 RX ORDER — FAMOTIDINE 20 MG/1
20 TABLET, FILM COATED ORAL 2 TIMES DAILY PRN
Status: DISCONTINUED | OUTPATIENT
Start: 2020-05-22 | End: 2020-05-24 | Stop reason: HOSPADM

## 2020-05-22 RX ORDER — HYDROCODONE BITARTRATE AND ACETAMINOPHEN 5; 325 MG/1; MG/1
1 TABLET ORAL EVERY 6 HOURS PRN
Status: DISCONTINUED | OUTPATIENT
Start: 2020-05-22 | End: 2020-05-24 | Stop reason: HOSPADM

## 2020-05-22 RX ORDER — FERROUS SULFATE 325(65) MG
325 TABLET ORAL
Status: DISCONTINUED | OUTPATIENT
Start: 2020-05-23 | End: 2020-05-24 | Stop reason: HOSPADM

## 2020-05-22 RX ORDER — SODIUM CHLORIDE 0.9 % (FLUSH) 0.9 %
10 SYRINGE (ML) INJECTION EVERY 12 HOURS SCHEDULED
Status: DISCONTINUED | OUTPATIENT
Start: 2020-05-22 | End: 2020-05-24 | Stop reason: HOSPADM

## 2020-05-22 RX ORDER — SIMETHICONE 80 MG
80 TABLET,CHEWABLE ORAL EVERY 6 HOURS PRN
Status: DISCONTINUED | OUTPATIENT
Start: 2020-05-22 | End: 2020-05-24 | Stop reason: HOSPADM

## 2020-05-22 RX ORDER — HYDROCODONE BITARTRATE AND ACETAMINOPHEN 5; 325 MG/1; MG/1
2 TABLET ORAL EVERY 6 HOURS PRN
Status: DISCONTINUED | OUTPATIENT
Start: 2020-05-22 | End: 2020-05-24 | Stop reason: HOSPADM

## 2020-05-22 RX ORDER — ONDANSETRON 2 MG/ML
4 INJECTION INTRAMUSCULAR; INTRAVENOUS EVERY 6 HOURS PRN
Status: DISCONTINUED | OUTPATIENT
Start: 2020-05-22 | End: 2020-05-22

## 2020-05-22 RX ORDER — BUPIVACAINE HYDROCHLORIDE 5 MG/ML
INJECTION, SOLUTION EPIDURAL; INTRACAUDAL PRN
Status: DISCONTINUED | OUTPATIENT
Start: 2020-05-22 | End: 2020-05-22 | Stop reason: SDUPTHER

## 2020-05-22 RX ORDER — ACETAMINOPHEN 325 MG/1
650 TABLET ORAL EVERY 4 HOURS PRN
Status: DISCONTINUED | OUTPATIENT
Start: 2020-05-22 | End: 2020-05-24 | Stop reason: HOSPADM

## 2020-05-22 RX ORDER — IBUPROFEN 800 MG/1
800 TABLET ORAL EVERY 8 HOURS
Status: DISCONTINUED | OUTPATIENT
Start: 2020-05-22 | End: 2020-05-24 | Stop reason: HOSPADM

## 2020-05-22 RX ORDER — CALCIUM CARBONATE 200(500)MG
1 TABLET,CHEWABLE ORAL DAILY
Status: DISCONTINUED | OUTPATIENT
Start: 2020-05-22 | End: 2020-05-22

## 2020-05-22 RX ORDER — SODIUM CHLORIDE, SODIUM LACTATE, POTASSIUM CHLORIDE, CALCIUM CHLORIDE 600; 310; 30; 20 MG/100ML; MG/100ML; MG/100ML; MG/100ML
INJECTION, SOLUTION INTRAVENOUS
Status: COMPLETED
Start: 2020-05-22 | End: 2020-05-22

## 2020-05-22 RX ORDER — ONDANSETRON 2 MG/ML
4 INJECTION INTRAMUSCULAR; INTRAVENOUS EVERY 6 HOURS PRN
Status: DISCONTINUED | OUTPATIENT
Start: 2020-05-22 | End: 2020-05-24 | Stop reason: HOSPADM

## 2020-05-22 RX ADMIN — Medication 1 MILLI-UNITS/MIN: at 17:55

## 2020-05-22 RX ADMIN — BUPIVACAINE HYDROCHLORIDE 5 ML: 5 INJECTION, SOLUTION EPIDURAL; INTRACAUDAL; PERINEURAL at 15:13

## 2020-05-22 RX ADMIN — BUPIVACAINE HYDROCHLORIDE 2.5 ML: 5 INJECTION, SOLUTION EPIDURAL; INTRACAUDAL; PERINEURAL at 10:16

## 2020-05-22 RX ADMIN — DOCUSATE SODIUM 100 MG: 100 CAPSULE, LIQUID FILLED ORAL at 22:19

## 2020-05-22 RX ADMIN — Medication 15 ML/HR: at 10:45

## 2020-05-22 RX ADMIN — IBUPROFEN 800 MG: 800 TABLET ORAL at 19:18

## 2020-05-22 RX ADMIN — Medication 2.5 MILLION UNITS: at 13:23

## 2020-05-22 RX ADMIN — SODIUM CHLORIDE, POTASSIUM CHLORIDE, SODIUM LACTATE AND CALCIUM CHLORIDE: 600; 310; 30; 20 INJECTION, SOLUTION INTRAVENOUS at 09:15

## 2020-05-22 RX ADMIN — SODIUM CHLORIDE, POTASSIUM CHLORIDE, SODIUM LACTATE AND CALCIUM CHLORIDE 1000 ML: 600; 310; 30; 20 INJECTION, SOLUTION INTRAVENOUS at 10:01

## 2020-05-22 RX ADMIN — DEXTROSE MONOHYDRATE 5 MILLION UNITS: 5 INJECTION INTRAVENOUS at 09:30

## 2020-05-22 RX ADMIN — BUPIVACAINE HYDROCHLORIDE 2.5 ML: 2.5 INJECTION, SOLUTION EPIDURAL; INFILTRATION; INTRACAUDAL; PERINEURAL at 10:16

## 2020-05-22 ASSESSMENT — PAIN SCALES - GENERAL: PAINLEVEL_OUTOF10: 6

## 2020-05-22 NOTE — ANESTHESIA PRE PROCEDURE
DO        benzocaine-menthol (DERMOPLAST) 20-0.5 % spray   Topical PRN Sangita Talley DO        nalbuphine (NUBAIN) injection 10 mg  10 mg Intravenous Q2H PRN Sangita Talley DO        oxytocin (PITOCIN) 30 units in 500 mL infusion  1 anahy-units/min Intravenous Continuous PRN Sangita Talley DO        penicillin G potassium in d5w IVPB 2.5 Million Units  2.5 Million Units Intravenous Q4H Sangita Talley DO         Facility-Administered Medications Ordered in Other Encounters   Medication Dose Route Frequency Provider Last Rate Last Dose    bupivacaine (PF) (MARCAINE) 0.25 % injection    PRN Enrrique Osorio APRN - CRNA   2.5 mL at 05/22/20 1016    bupivacaine (PF) (MARCAINE) 0.5 % injection    PRN Enrrique Osorio APRN - CRNA   2.5 mL at 05/22/20 1016       Allergies: Allergies   Allergen Reactions    Augmentin [Amoxicillin-Pot Clavulanate] Rash       Problem List:    Patient Active Problem List   Diagnosis Code    Renal stone N20.0    Obesity (BMI 30-39. 9) E66.9    History of cervical dysplasia Z87.410    Cervical high risk human papillomavirus (HPV) DNA test positive R87.810    Prenatal care, subsequent pregnancy in third trimester Z34.83    History of cervical LEEP biopsy affecting care of mother, antepartum O34.40, Z98.890    Group B Streptococcus carrier, +RV culture, currently pregnant O99.820    Normal labor O80, Z37.9       Past Medical History:        Diagnosis Date    Abnormal Pap smear of cervix     Hydronephrosis 6/26/2013    Obesity (BMI 30.0-34.9) 6/16/2014    Renal stone 6/26/2013    Varicella        Past Surgical History:        Procedure Laterality Date    ADENOIDECTOMY      LEEP  2012    TONSILLECTOMY      TYMPANOSTOMY TUBE PLACEMENT      WISDOM TOOTH EXTRACTION         Social History:    Social History     Tobacco Use    Smoking status: Never Smoker    Smokeless tobacco: Never Used   Substance Use Topics    Alcohol use: Not Currently

## 2020-05-22 NOTE — PROGRESS NOTES
Pt called RN c/o thinking that her water broke. SVE performed, unchanged except for a much smaller bag of water that was felt. No gushing when pt asked to cough, but a small trickle of fluid seen and a small area of saturation on her pad. Will continue to monitor.

## 2020-05-23 LAB
BASOPHILS ABSOLUTE: 0.1 K/UL (ref 0–0.2)
BASOPHILS RELATIVE PERCENT: 0.7 %
EOSINOPHILS ABSOLUTE: 0.1 K/UL (ref 0–0.6)
EOSINOPHILS RELATIVE PERCENT: 1.2 %
HCT VFR BLD CALC: 37.5 % (ref 36–48)
HEMOGLOBIN: 12.5 G/DL (ref 12–16)
LYMPHOCYTES ABSOLUTE: 2.4 K/UL (ref 1–5.1)
LYMPHOCYTES RELATIVE PERCENT: 22.4 %
MCH RBC QN AUTO: 30.7 PG (ref 26–34)
MCHC RBC AUTO-ENTMCNC: 33.2 G/DL (ref 31–36)
MCV RBC AUTO: 92.3 FL (ref 80–100)
MONOCYTES ABSOLUTE: 0.7 K/UL (ref 0–1.3)
MONOCYTES RELATIVE PERCENT: 6.6 %
NEUTROPHILS ABSOLUTE: 7.4 K/UL (ref 1.7–7.7)
NEUTROPHILS RELATIVE PERCENT: 69.1 %
PDW BLD-RTO: 14.4 % (ref 12.4–15.4)
PLATELET # BLD: 161 K/UL (ref 135–450)
PMV BLD AUTO: 9.9 FL (ref 5–10.5)
RBC # BLD: 4.06 M/UL (ref 4–5.2)
WBC # BLD: 10.7 K/UL (ref 4–11)

## 2020-05-23 PROCEDURE — 1220000000 HC SEMI PRIVATE OB R&B

## 2020-05-23 PROCEDURE — 6370000000 HC RX 637 (ALT 250 FOR IP): Performed by: OBSTETRICS & GYNECOLOGY

## 2020-05-23 PROCEDURE — 85025 COMPLETE CBC W/AUTO DIFF WBC: CPT

## 2020-05-23 PROCEDURE — 2580000003 HC RX 258: Performed by: OBSTETRICS & GYNECOLOGY

## 2020-05-23 PROCEDURE — 36415 COLL VENOUS BLD VENIPUNCTURE: CPT

## 2020-05-23 RX ORDER — HYDROCODONE BITARTRATE AND ACETAMINOPHEN 5; 325 MG/1; MG/1
1 TABLET ORAL EVERY 6 HOURS PRN
Qty: 5 TABLET | Refills: 0 | Status: SHIPPED | OUTPATIENT
Start: 2020-05-23 | End: 2020-05-26

## 2020-05-23 RX ORDER — IBUPROFEN 800 MG/1
800 TABLET ORAL EVERY 8 HOURS PRN
Qty: 30 TABLET | Refills: 0 | Status: SHIPPED | OUTPATIENT
Start: 2020-05-23 | End: 2021-08-12

## 2020-05-23 RX ADMIN — IBUPROFEN 800 MG: 800 TABLET ORAL at 03:17

## 2020-05-23 RX ADMIN — IBUPROFEN 800 MG: 800 TABLET ORAL at 20:23

## 2020-05-23 RX ADMIN — Medication 10 ML: at 08:40

## 2020-05-23 RX ADMIN — ACETAMINOPHEN 650 MG: 325 TABLET ORAL at 08:45

## 2020-05-23 RX ADMIN — IBUPROFEN 800 MG: 800 TABLET ORAL at 10:50

## 2020-05-23 RX ADMIN — DOCUSATE SODIUM 100 MG: 100 CAPSULE, LIQUID FILLED ORAL at 08:40

## 2020-05-23 RX ADMIN — DOCUSATE SODIUM 100 MG: 100 CAPSULE, LIQUID FILLED ORAL at 20:23

## 2020-05-23 RX ADMIN — ACETAMINOPHEN 650 MG: 325 TABLET ORAL at 15:59

## 2020-05-23 ASSESSMENT — PAIN SCALES - GENERAL
PAINLEVEL_OUTOF10: 3
PAINLEVEL_OUTOF10: 3
PAINLEVEL_OUTOF10: 5
PAINLEVEL_OUTOF10: 3

## 2020-05-23 NOTE — ANESTHESIA POSTPROCEDURE EVALUATION
Department of Anesthesiology  Postprocedure Note    Patient: Lizy Rea  MRN: [id number]  YOB: 1990  Date of evaluation: 5/23/2020  Time:  6:31 PM     Procedure Summary     Date:  05/22/20 Room / Location:      Anesthesia Start:  5263 Anesthesia Stop:  9784    Procedure:  Labor Analgesia Diagnosis:      Scheduled Providers:   Responsible Provider:  Tomas Ricketts MD    Anesthesia Type:  epidural ASA Status:  3 - Emergent          Anesthesia Type: No value filed. Ismael Phase I: Ismael Score: 10    Ismael Phase II: Ismael Score: 10    Last vitals: Reviewed and per EMR flowsheets.        Anesthesia Post Evaluation    Patient location during evaluation: bedside  Patient participation: complete - patient participated  Level of consciousness: awake and alert  Pain score: 0  Airway patency: patent  Nausea & Vomiting: no nausea and no vomiting  Complications: no  Cardiovascular status: blood pressure returned to baseline  Respiratory status: room air and spontaneous ventilation  Hydration status: euvolemic

## 2020-05-23 NOTE — LACTATION NOTE
Lactation Progress Note      Data:   Multip who is breast feeding for the first time. Mob states that baby is using a nipple shield for flat nipples. Action: Explained that nipple shield should be for temporary use. Propper shield use demonstrated. Baby currently sleeping so no feed observed. BF education provided. Encouraged to allow baby to go to breast ad keisha and to feed as long as she wants. Explained that baby will likely cluster feed tonight and explained it's importance to lactogenesis. Discouraged paci and bottles for the first few weeks. Encouraged good hydration and nutrition. [facility] number on board for f/u. Response: Verbalized understanding and will call for f/u prn.

## 2020-05-23 NOTE — DISCHARGE INSTR - DIET

## 2020-05-24 VITALS
SYSTOLIC BLOOD PRESSURE: 104 MMHG | WEIGHT: 218 LBS | TEMPERATURE: 98 F | RESPIRATION RATE: 16 BRPM | HEIGHT: 64 IN | HEART RATE: 82 BPM | DIASTOLIC BLOOD PRESSURE: 69 MMHG | BODY MASS INDEX: 37.22 KG/M2

## 2020-05-24 PROCEDURE — 6370000000 HC RX 637 (ALT 250 FOR IP): Performed by: OBSTETRICS & GYNECOLOGY

## 2020-05-24 RX ADMIN — IBUPROFEN 800 MG: 800 TABLET ORAL at 04:38

## 2020-05-24 RX ADMIN — DOCUSATE SODIUM 100 MG: 100 CAPSULE, LIQUID FILLED ORAL at 09:18

## 2020-05-24 ASSESSMENT — PAIN SCALES - GENERAL: PAINLEVEL_OUTOF10: 0

## 2020-05-24 NOTE — PLAN OF CARE
Problem: VAGINAL DELIVERY - RECOVERY AND POST PARTUM  Goal: Vital signs are medically acceptable  5/23/2020 1015 by Scott Mattson RN  Outcome: Ongoing  5/23/2020 0130 by Justino Fernandes RN  Outcome: Ongoing  Goal: Patient will remain free of falls  5/23/2020 1015 by Scott Mattson RN  Outcome: Ongoing  5/23/2020 0130 by Justino Fernandes RN  Outcome: Ongoing  Goal: Fundus firm at midline  5/23/2020 1015 by Scott Mattson RN  Outcome: Ongoing  5/23/2020 0130 by Justino Fernandes RN  Outcome: Ongoing  Goal: Moderate rubra without clots, no purulent discharge, no foul smelling lochia  5/23/2020 1015 by Scott Mattson RN  Outcome: Ongoing  5/23/2020 0130 by Justino Fernandes RN  Outcome: Ongoing  Goal: Empties bladder  5/23/2020 1015 by Scott Mattson RN  Outcome: Ongoing  5/23/2020 0130 by Justino Fernandes RN  Outcome: Ongoing  Goal: Verbalizes understanding of normal bowel function resumption  5/23/2020 1015 by Scott Mattson RN  Outcome: Ongoing  5/23/2020 0130 by Justino Fernandes RN  Outcome: Ongoing  Goal: Edema will be absent or minimal  5/23/2020 1015 by Scott Mattson RN  Outcome: Ongoing  5/23/2020 0130 by Justino Fernandes RN  Outcome: Ongoing  Goal: Breasts are soft with nipple integrity intact  5/23/2020 1015 by Scott Mattson RN  Outcome: Ongoing  5/23/2020 0130 by Justino Fernandes RN  Outcome: Ongoing  Goal: Demonstrates appropriate breast feeding techniques  5/23/2020 1015 by Scott Mattson RN  Outcome: Ongoing  5/23/2020 0130 by Justino Fernandes RN  Outcome: Ongoing  Goal: Appropriate behavior observed  5/23/2020 1015 by Scott Mattson RN  Outcome: Ongoing  5/23/2020 0130 by Justino Fernandes RN  Outcome: Ongoing  Goal: Positive Mother-Baby interactions are observed  5/23/2020 1015 by Scott Mattson RN  Outcome: Ongoing  5/23/2020 0130 by Justino Fernandes RN  Outcome: Ongoing  Goal: Perineum intact without discharge or hematoma  5/23/2020 1015 by Scott Mattson RN  Outcome: Ongoing  5/23/2020 0130 by
Ongoing  5/24/2020 0031 by Mitesh Painter RN  Outcome: Ongoing  Goal: Ambulates independently  5/24/2020 0730 by Pio Honeycutt RN  Outcome: Ongoing  5/24/2020 0031 by Mitesh Painter RN  Outcome: Ongoing     Problem: PAIN  Goal: Patient's pain/discomfort is manageable  5/24/2020 0730 by Pio Honeycutt RN  Outcome: Ongoing  5/24/2020 0031 by Mitesh Painter RN  Outcome: Ongoing     Problem: KNOWLEDGE DEFICIT  Goal: Patient/S.O. demonstrates understanding of disease process, treatment plan, medications, and discharge instructions.   5/24/2020 0730 by Pio Honeycutt RN  Outcome: Ongoing  5/24/2020 0031 by Mitesh Painter RN  Outcome: Ongoing

## 2020-06-05 ENCOUNTER — POSTPARTUM VISIT (OUTPATIENT)
Dept: OBGYN CLINIC | Age: 30
End: 2020-06-05

## 2020-06-05 VITALS
HEART RATE: 66 BPM | SYSTOLIC BLOOD PRESSURE: 122 MMHG | WEIGHT: 192 LBS | DIASTOLIC BLOOD PRESSURE: 76 MMHG | BODY MASS INDEX: 32.96 KG/M2 | TEMPERATURE: 98.2 F

## 2020-06-05 PROBLEM — Z98.890 HISTORY OF CERVICAL LEEP BIOPSY AFFECTING CARE OF MOTHER, ANTEPARTUM: Status: RESOLVED | Noted: 2019-12-02 | Resolved: 2020-06-05

## 2020-06-05 PROBLEM — Z37.9 NORMAL LABOR: Status: RESOLVED | Noted: 2020-05-22 | Resolved: 2020-06-05

## 2020-06-05 PROBLEM — O99.820 GROUP B STREPTOCOCCUS CARRIER, +RV CULTURE, CURRENTLY PREGNANT: Status: RESOLVED | Noted: 2020-04-30 | Resolved: 2020-06-05

## 2020-06-05 PROBLEM — O34.40 HISTORY OF CERVICAL LEEP BIOPSY AFFECTING CARE OF MOTHER, ANTEPARTUM: Status: RESOLVED | Noted: 2019-12-02 | Resolved: 2020-06-05

## 2020-06-05 PROBLEM — Z34.83 PRENATAL CARE, SUBSEQUENT PREGNANCY IN THIRD TRIMESTER: Status: RESOLVED | Noted: 2019-12-02 | Resolved: 2020-06-05

## 2020-06-05 PROCEDURE — 0503F POSTPARTUM CARE VISIT: CPT | Performed by: OBSTETRICS & GYNECOLOGY

## 2020-06-05 RX ORDER — DOCUSATE SODIUM 100 MG/1
100 CAPSULE, LIQUID FILLED ORAL 2 TIMES DAILY
COMMUNITY
End: 2020-07-15 | Stop reason: ALTCHOICE

## 2020-06-05 ASSESSMENT — ENCOUNTER SYMPTOMS
NAUSEA: 0
SHORTNESS OF BREATH: 0
CONSTIPATION: 0
VOMITING: 0
DIARRHEA: 0
RESPIRATORY NEGATIVE: 1
ABDOMINAL PAIN: 0
GASTROINTESTINAL NEGATIVE: 1

## 2020-06-08 ENCOUNTER — TELEPHONE (OUTPATIENT)
Dept: OBGYN CLINIC | Age: 30
End: 2020-06-08

## 2020-06-17 ENCOUNTER — TELEPHONE (OUTPATIENT)
Dept: OBGYN CLINIC | Age: 30
End: 2020-06-17

## 2020-07-15 ENCOUNTER — POSTPARTUM VISIT (OUTPATIENT)
Dept: OBGYN CLINIC | Age: 30
End: 2020-07-15
Payer: COMMERCIAL

## 2020-07-15 VITALS
HEART RATE: 67 BPM | WEIGHT: 188.8 LBS | SYSTOLIC BLOOD PRESSURE: 104 MMHG | TEMPERATURE: 97.9 F | BODY MASS INDEX: 32.41 KG/M2 | DIASTOLIC BLOOD PRESSURE: 68 MMHG

## 2020-07-15 LAB
CONTROL: NORMAL
PREGNANCY TEST URINE, POC: NORMAL

## 2020-07-15 PROCEDURE — 58300 INSERT INTRAUTERINE DEVICE: CPT | Performed by: OBSTETRICS & GYNECOLOGY

## 2020-07-15 PROCEDURE — 99999 PR OFFICE/OUTPT VISIT,PROCEDURE ONLY: CPT | Performed by: OBSTETRICS & GYNECOLOGY

## 2020-07-15 PROCEDURE — 81025 URINE PREGNANCY TEST: CPT | Performed by: OBSTETRICS & GYNECOLOGY

## 2020-07-16 ASSESSMENT — ENCOUNTER SYMPTOMS
SHORTNESS OF BREATH: 0
EYE PAIN: 0

## 2020-07-16 NOTE — PROGRESS NOTES
Subjective:      Patient ID: Jerry Erwin is a 31 y.o. female. HPI  Jerry Erwin is a 30 y/o   female who presents for Mirena IUD insertion. Patient is 8 weeks s/p  at 40w3d. Has done well since delivery. Pregnancy was complicated by GBS positive, obesity, anxiety and hx of LEEP. Delivery complicated by shoulder dystocia. Breast feeding. Recommendations for STI screening reviewed--patient declines testing. Tested negative with pregnancy. Review of Systems   Constitutional: Negative for activity change, appetite change, chills, fatigue, fever and unexpected weight change. Eyes: Negative for pain. Respiratory: Negative for shortness of breath. Cardiovascular: Negative for chest pain. Genitourinary: Negative for difficulty urinating, dysuria, hematuria, menstrual problem, pelvic pain, vaginal bleeding, vaginal discharge and vaginal pain. Psychiatric/Behavioral: Negative. Objective:   Physical Exam  Vitals signs and nursing note reviewed. Exam conducted with a chaperone present. Constitutional:       General: She is not in acute distress. Appearance: She is well-developed. She is not diaphoretic. Pulmonary:      Effort: Pulmonary effort is normal.   Genitourinary:     General: Normal vulva. Exam position: Lithotomy position. Labia:         Right: No rash, tenderness, lesion or injury. Left: No rash, tenderness, lesion or injury. Vagina: No signs of injury and foreign body. No vaginal discharge, erythema, tenderness, bleeding or lesions. Cervix: No cervical motion tenderness, discharge, friability, lesion or cervical bleeding. Skin:     General: Skin is warm and dry. Neurological:      Mental Status: She is alert and oriented to person, place, and time. Psychiatric:         Behavior: Behavior normal.         Thought Content:  Thought content normal.         Judgment: Judgment normal.     IUD INSERTION--MIRENA  Written and informed consent--patient signed formal consent as well as  supplied consent. Risks and benefits were discussed. Patient was then taken to exam room and positioned in the dorsal lithotomy position. Speculum was then placed. Cervix was bathed in betadine in usual sterile fashion. Single tooth tenaculum was then applied to the anterior lip of the cervix. The uterus was then sounded to a depth of 8 centimeters. The Mirena IUD was then placed according to  instructions. The applicator was then removed. The IUD string was then cut at a generous length and the single tooth tenaculum was removed. Excellent hemostasis was assured. The speculum was then removed. The patient tolerated the procedure well. Assessment:       Diagnosis Orders   1. Encounter for IUD insertion     2. Urine pregnancy test negative  POCT urine pregnancy    96987 - [place] INSERT INTRAUTERINE DEVICE   3. Lactating mother     4. Obesity (BMI 30-39. 9)             Plan:      Orders Placed This Encounter   Procedures    POCT urine pregnancy    82477 - VA INSERT INTRAUTERINE DEVICE     Follow up prn and 4 weeks      Devang Suarez DO

## 2020-07-20 ENCOUNTER — VIRTUAL VISIT (OUTPATIENT)
Dept: PSYCHOLOGY | Age: 30
End: 2020-07-20
Payer: COMMERCIAL

## 2020-07-20 PROCEDURE — 90832 PSYTX W PT 30 MINUTES: CPT | Performed by: SOCIAL WORKER

## 2020-07-20 NOTE — PROGRESS NOTES
Behavioral Health Consultation  Jack Miranda. CHILO MontgomeryRAMIRO  7/27/2020  3:40 PM EDT      Time spent with Patient: 30 minutes  This is patient's fifth [facility] appointment. Reason for Consult:    Chief Complaint   Patient presents with    Anxiety    Depression     Referring Provider: Elroy August DO  [facility] [place][note 2][address] [address]    Feedback given to PCP. TELEHEALTH VISIT -- Audio/Visual (During EXEBC-12 public health emergency)  }  Pursuant to the emergency declaration under the [address] [place] [address], [facility] authority and the [employer] Act, this Virtual Visit was conducted, with patient's consent, to reduce the patient's risk of exposure to COVID-19 and provide continuity of care for an established patient. Services were provided through a video synchronous discussion virtually to substitute for in-person clinic visit. Pt gave verbal informed consent to participate in telehealth services. Conducted a risk-benefit analysis and determined that the patient's presenting problems are consistent with the use of telepsychology. Determined that the patient has sufficient knowledge and skills in the use of technology enabling them to adequately benefit from telepsychology. It was determined that this patient was able to be properly treated without an in-person session. Patient verified that they were currently located at the [facility] address that was provided during registration.     Verified the following information:  Patient's identification: Yes  Patient location: [facility] [address]   Patient's call back number: [phone]   Patient's emergency contact's name and number, as well as permission to contact them if needed: Extended Emergency Contact Information  Primary Emergency Contact: Angie Dodge, [address] [facility] Phone: [phone]  Relation: Domestic Partner  Secondary Emergency Assessment: no suicidal ideation, plan, or intent  Homicidal Ideation: no    History:    Medications:   Current Outpatient Medications   Medication Sig Dispense Refill    ibuprofen (ADVIL;MOTRIN) 800 MG tablet Take 1 tablet by mouth every 8 hours as needed for Pain 30 tablet 0    Prenatal Vit-Fe Fumarate-FA ( PRENATAL MULTIVITAMINS) 28-0.8 MG TABS Take 1 tablet by mouth daily 90 tablet 3     Current Facility-Administered Medications   Medication Dose Route Frequency Provider Last Rate Last Dose    levonorgestrel (MIRENA) IUD 52 mg 1 each  1 each Intrauterine Once Emani Cantu,          Social History:   Social History     Socioeconomic History    Marital status: Single     Spouse name: Not on file    Number of children: Not on file    Years of education: Not on file    Highest education level: Not on file   Occupational History    Occupation: RN   Social Needs    Financial resource strain: Not on file    Food insecurity     Worry: Not on file     Inability: Not on file   [employer] needs     Medical: Not on file     Non-medical: Not on file   Tobacco Use    Smoking status: Never Smoker    Smokeless tobacco: Never Used   Substance and Sexual Activity    Alcohol use: Not Currently     Alcohol/week: 0.0 standard drinks     Comment: FEW TIMES PER MONTH    Drug use: No    Sexual activity: Yes     Partners: Male   Lifestyle    Physical activity     Days per week: Not on file     Minutes per session: Not on file    Stress: Not on file   Relationships    Social connections     Talks on phone: Not on file     Gets together: Not on file     Attends [employer] service: Not on file     Active member of club or organization: Not on file     Attends meetings of clubs or organizations: Not on file     Relationship status: Not on file    Intimate partner violence     Fear of current or ex partner: Not on file     Emotionally abused: Not on file     Physically abused: Not on file     Forced sexual activity: Not on file   Other Topics Concern    Not on file   Social History Narrative    Not on file     TOBACCO:   reports that she has never smoked. She has never used smokeless tobacco.  ETOH:   reports previous alcohol use. Family History:   Family History   Problem Relation Age of Onset    Diabetes Father     High Blood Pressure Father     High Cholesterol Father     Cancer Maternal Aunt         breast    Uterine Cancer Maternal Aunt     Cancer Maternal Aunt 79        breast    No Known Problems Paternal Grandfather     Heart Attack Paternal Grandmother     Stroke Maternal Grandmother     No Known Problems Maternal Grandfather     Depression Mother     High Blood Pressure Mother     Other Brother         severe seasonal allergies       A:  Pt endorses doing well following the delivery of her baby 8 weeks ago. Some anxiety with returning to work and a few day of being down but better now. No SI/HI, insight and motivation good. Diagnosis:    1.  Adjustment disorder with mixed anxiety and depressed mood          Diagnosis Date    Abnormal Pap smear of cervix     Hydronephrosis 6/26/2013    Mild postpartum depression 6/5/2020    Obesity (BMI 30.0-34.9) 6/16/2014    Renal stone 6/26/2013    Varicella      Plan:  Pt interventions:  Trained in strategies for increasing balanced thinking, Discussed and set plan for behavioral activation, Discussed self-care (sleep, nutrition, rewarding activities, social support, exercise), Motivational Interviewing to increase patient confidence and compliance with adhering to behavioral change plan, Motivational Interviewing to determine importance and readiness for change, Supportive techniques and Identified maladaptive thoughts    Pt Behavioral Change Plan:   See Pt Instructions

## 2020-08-05 ENCOUNTER — VIRTUAL VISIT (OUTPATIENT)
Dept: PSYCHOLOGY | Age: 30
End: 2020-08-05
Payer: COMMERCIAL

## 2020-08-05 PROCEDURE — 90832 PSYTX W PT 30 MINUTES: CPT | Performed by: SOCIAL WORKER

## 2020-08-05 NOTE — PROGRESS NOTES
Behavioral Health Consultation  OLIVIA Aparicio  8/10/2020  4:22 PM EDT      Time spent with Patient: 30 minutes  This is patient's third [facility] appointment. Reason for Consult:    Chief Complaint   Patient presents with    Anxiety    Depression     Referring Provider: Shabana Prakash DO  [address] [place] [address]    Feedback given to PCP. TELEHEALTH VISIT -- Audio/Visual (During QPSZX-12 public health emergency)  }  Pursuant to the emergency declaration under the [address] [place] [address], [facility] authority and the [employer] Act, this Virtual Visit was conducted, with patient's consent, to reduce the patient's risk of exposure to COVID-19 and provide continuity of care for an established patient. Services were provided through a video synchronous discussion virtually to substitute for in-person clinic visit. Pt gave verbal informed consent to participate in telehealth services. Conducted a risk-benefit analysis and determined that the patient's presenting problems are consistent with the use of telepsychology. Determined that the patient has sufficient knowledge and skills in the use of technology enabling them to adequately benefit from telepsychology. It was determined that this patient was able to be properly treated without an in-person session. Patient verified that they were currently located at the [facility] address that was provided during registration.     Verified the following information:  Patient's identification: Yes  Patient location: [place] [address]   Patient's call back number: [phone]   Patient's emergency contact's name and number, as well as permission to contact them if needed: Extended Emergency Contact Information  Primary Emergency Contact: Brain NicholasjessicaLisbeth esquedaAnirudh  [address] Phone: [phone]  Relation: Domestic Partner  Secondary Emergency Relation Age of Onset    Diabetes Father     High Blood Pressure Father     High Cholesterol Father     Cancer Maternal Aunt         breast    Uterine Cancer Maternal Aunt     Cancer Maternal Aunt 79        breast    No Known Problems Paternal Grandfather     Heart Attack Paternal Grandmother     Stroke Maternal Grandmother     No Known Problems Maternal Grandfather     Depression Mother     High Blood Pressure Mother     Other Brother         severe seasonal allergies       A:  Pt doing well overall, some mood fluctuation but overall things are good. No SI/HI, insight and motivation good. Diagnosis:    1.  Adjustment disorder with mixed anxiety and depressed mood          Diagnosis Date    Abnormal Pap smear of cervix     Hydronephrosis 6/26/2013    Mild postpartum depression 6/5/2020    Obesity (BMI 30.0-34.9) 6/16/2014    Renal stone 6/26/2013    Varicella      Plan:  Pt interventions:  Trained in strategies for increasing balanced thinking, Discussed and set plan for behavioral activation, Discussed self-care (sleep, nutrition, rewarding activities, social support, exercise), Motivational Interviewing to increase patient confidence and compliance with adhering to behavioral change plan, Motivational Interviewing to determine importance and readiness for change, Supportive techniques and Provided pt book recommendation    Pt Behavioral Change Plan:   See Pt Instructions

## 2020-08-05 NOTE — PATIENT INSTRUCTIONS
1.  Consider reading The Gifts of Imperfection  2. Continue working your workbook  3. Try to get some workout in on off days  4. Return to see Josh Sender in 2 weeks.

## 2020-08-14 ENCOUNTER — OFFICE VISIT (OUTPATIENT)
Dept: OBGYN CLINIC | Age: 30
End: 2020-08-14
Payer: COMMERCIAL

## 2020-08-14 ENCOUNTER — POSTPARTUM VISIT (OUTPATIENT)
Dept: OBGYN CLINIC | Age: 30
End: 2020-08-14
Payer: COMMERCIAL

## 2020-08-14 VITALS
BODY MASS INDEX: 32.72 KG/M2 | WEIGHT: 190.6 LBS | SYSTOLIC BLOOD PRESSURE: 106 MMHG | TEMPERATURE: 97.9 F | DIASTOLIC BLOOD PRESSURE: 72 MMHG | HEART RATE: 77 BPM

## 2020-08-14 PROCEDURE — 99213 OFFICE O/P EST LOW 20 MIN: CPT | Performed by: OBSTETRICS & GYNECOLOGY

## 2020-08-14 PROCEDURE — 76856 US EXAM PELVIC COMPLETE: CPT | Performed by: OBSTETRICS & GYNECOLOGY

## 2020-08-16 ASSESSMENT — ENCOUNTER SYMPTOMS
NAUSEA: 0
DIARRHEA: 0
ABDOMINAL PAIN: 0
CONSTIPATION: 0
ABDOMINAL DISTENTION: 0
VOMITING: 0
SHORTNESS OF BREATH: 0

## 2020-08-16 NOTE — PROGRESS NOTES
Subjective:      Patient ID: Kathern Baumgarten is a 31 y.o. female. HPI  30 y/o  (son is 6years old; daughter adopted at birth--patient receives regular updates) female presents for evaluation and ultrasound secondary to vaginal bleeding. Patient recently seen on 7/15/2020 for IUD insertion. Since insertion has noted irregular bleeding. Bled the day after insertion. Has continued to bleed and has noted increase in volume in the past week--2 pads/day. Denies lightheadedness and dizziness. Admits to menstrual cramps. Has done well with IUD in the past (prior to most recent pregnancy). Feels moods since delivery have been stable--seeing Susan for counseling. Last pap smear was 19 --normal, negative testing for high risk HPV. History significant for positive high risk HPV testing. History significant for LEEP procedure in  secondary to abnormal cells . Had normal pap smear in , , 2015, 2016, 2017, 2018, and 2019. Sexually active, no problems, monogamous x 3 years. Review of Systems   Constitutional: Negative. Negative for activity change, appetite change, chills, fatigue, fever and unexpected weight change. Respiratory: Negative for shortness of breath. Cardiovascular: Negative for chest pain. Gastrointestinal: Negative for abdominal distention, abdominal pain, constipation, diarrhea, nausea and vomiting. Genitourinary: Positive for menstrual problem and vaginal bleeding. Negative for difficulty urinating, dysuria, hematuria, pelvic pain, vaginal discharge and vaginal pain. Psychiatric/Behavioral: Negative. Negative for self-injury and suicidal ideas. Objective:   Physical Exam  Vitals signs and nursing note reviewed. Constitutional:       General: She is not in acute distress. Appearance: Normal appearance. She is well-developed. She is not diaphoretic.    Pulmonary:      Effort: Pulmonary effort is normal.   Abdominal:      General: There is no distension. Tenderness: There is no abdominal tenderness. There is no guarding. Skin:     General: Skin is warm and dry. Neurological:      Mental Status: She is alert and oriented to person, place, and time. Psychiatric:         Behavior: Behavior normal.         Thought Content: Thought content normal.         Judgment: Judgment normal.       PELVIC ULTRASOUND without DOPPLER INTERROGATION   NON OB    DATE: 08/14/2020    PHYSICIAN: JARETH Liu D.O.     SONOGRAPHER: MARY Vasquez [facility]    INDICATION: irregular vaginal bleeding, IUD placement    TYPE OF SCAN: vaginal    FINDINGS:    The cul de sac is normal. No free fluid appreciated. The cervix is normal and not enlarged. Nabothian cyst/s is not noted within the uterine cervix. The uterus measures 8.32 cm x 6.03 cm x 4.25 cm. The uterus is anteverted. The endometrium measures 9.05 mm. IUD seen within the endometrial canal in the appropriate position and orientation. The myometrium is homogeneous in appearance. No uterine anomalies are noted. The right ovary is present and normal.  The right ovary measures 1.62 cm x 1.65 cm x 2.61 cm. Ovary findings:  no masses seen. The right adnexa is normal.  The left ovary is present and normal.  The left ovary measures 2.92 cm x 2.95 cm x 3.01 cm. Ovary findings:  A 2.44 cm simple cyst is present on the left ovary. .  The left adnexa is normal.    IMPRESSION: Normal appearing uterus with an IUD seen in appropriate position in the endometrial canal.  Normal appearing right ovary. 2.4cm cyst left ovary. Imaging is limited secondary to bowel gas. The patient is well aware of the limitations of ultrasound in the detection of anomalies of the abdomen and pelvis. Assessment:       Diagnosis Orders   1. Irregular uterine bleeding     2. Lactating mother     3. IUD (intrauterine device) in place     4. History of cervical dysplasia     5. Obesity (BMI 30-39. 9)             Plan:     Approximately 15 minutes spent in counseling and coordination of care with over 50% in direct face to face counseling. Ultrasound result reviewed--reassurance. Menstrual diary. Pain and bleeding precautions. Continue with counseling. Follow up prn and 1 year for well woman examination.             Belen Liu,

## 2020-08-20 ENCOUNTER — VIRTUAL VISIT (OUTPATIENT)
Dept: PSYCHOLOGY | Age: 30
End: 2020-08-20
Payer: COMMERCIAL

## 2020-08-20 PROCEDURE — 90832 PSYTX W PT 30 MINUTES: CPT | Performed by: SOCIAL WORKER

## 2020-08-20 NOTE — PROGRESS NOTES
Behavioral Health Consultation  Maye Sommers. STACY Montgomery-S  8/27/2020  10:48 AM EDT      Time spent with Patient: 30 minutes  This is patient's fourth [facility] appointment. Reason for Consult:    Chief Complaint   Patient presents with    Anxiety    Depression     Referring Provider: Marie Fish DO  [address] [place] [address]    Feedback given to PCP. TELEHEALTH VISIT -- Audio/Visual (During NDZSL-05 public health emergency)  }  Pursuant to the emergency declaration under the [address] [place] [address] authority and the [employer] Act, this Virtual Visit was conducted, with patient's consent, to reduce the patient's risk of exposure to COVID-19 and provide continuity of care for an established patient. Services were provided through a video synchronous discussion virtually to substitute for in-person clinic visit. Pt gave verbal informed consent to participate in telehealth services. Conducted a risk-benefit analysis and determined that the patient's presenting problems are consistent with the use of telepsychology. Determined that the patient has sufficient knowledge and skills in the use of technology enabling them to adequately benefit from telepsychology. It was determined that this patient was able to be properly treated without an in-person session. Patient verified that they were currently located at the [facility] address that was provided during registration.     Verified the following information:  Patient's identification: Yes  Patient location: [facility] [address]   Patient's call back number: [phone]   Patient's emergency contact's name and number, as well as permission to contact them if needed: Extended Emergency Contact Information  Primary Emergency Contact: Otto Rosenberg [address] [facility] Phone: [phone]  Relation: Domestic Partner  Secondary Emergency Contact: Rizwan Emerson  Address: [address] [place] of [address] Phone: [phone]  Mobile Phone: [phone]  Relation: Parent     Provider location: Erick Evansiling:  Pt endorses some recent struggles with bonding with her daughter as she is feeling a lot of guilt about giving her 1st daughter up for adoption. She does agree that she would benefit from delving into specialty to work. Pt has been trying to get some exercise in, will walk at times. Struggling with cognitive distortions, especially personalization. Appetite is normal.  Just feeling disconnected. No SI/HI.        O:  MSE:    Appearance: good hygiene   Attitude: cooperative and friendly  Consciousness: alert  Orientation: oriented to person, place, time, general circumstance  Memory: recent and remote memory intact  Attention/Concentration: intact during session  Psychomotor Activity:normal  Eye Contact: normal  Speech: normal rate and volume, well-articulated  Mood: depressed  Affect: congruent  Perception: within normal limits  Thought Content: within normal limits  Thought Process: logical, coherent and goal-directed  Insight: good  Judgment: intact  Ability to understand instructions: Yes  Ability to respond meaningfully: Yes  Morbid Ideation: no   Suicide Assessment: no suicidal ideation, plan, or intent  Homicidal Ideation: no    History:    Medications:   Current Outpatient Medications   Medication Sig Dispense Refill    ibuprofen (ADVIL;MOTRIN) 800 MG tablet Take 1 tablet by mouth every 8 hours as needed for Pain 30 tablet 0    Prenatal Vit-Fe Fumarate-FA ( PRENATAL MULTIVITAMINS) 28-0.8 MG TABS Take 1 tablet by mouth daily 90 tablet 3     Current Facility-Administered Medications   Medication Dose Route Frequency Provider Last Rate Last Dose    levonorgestrel (MIRENA) IUD 52 mg 1 each  1 each Intrauterine Once Kofi Francois DO         Social History:   Social History Maternal Grandfather     Depression Mother     High Blood Pressure Mother     Other Brother         severe seasonal allergies       A:  Pt endorses struggles with increased depression. Will transition into specialty mental health as she is in need of more intense tx to deal with issues from the past and current depression. No SI/HI, insight and motivation good. Diagnosis:    1. DEMETRICE (generalized anxiety disorder)    2.  Postpartum depression          Diagnosis Date    Abnormal Pap smear of cervix     Hydronephrosis 6/26/2013    Mild postpartum depression 6/5/2020    Obesity (BMI 30.0-34.9) 6/16/2014    Renal stone 6/26/2013    Varicella      Plan:  Pt interventions:  Provided handout on  depression, Trained in strategies for increasing balanced thinking, Discussed and set plan for behavioral activation, Discussed self-care (sleep, nutrition, rewarding activities, social support, exercise), Discussed benefits of referral for specialty care, Motivational Interviewing to increase patient confidence and compliance with adhering to behavioral change plan, Motivational Interviewing to determine importance and readiness for change, Supportive techniques and Identified maladaptive thoughts    Pt Behavioral Change Plan:   See Pt Instructions

## 2020-08-20 NOTE — PATIENT INSTRUCTIONS
[address] [place] [address] and [facility] [phone] consider Dr. Jose Sheridan and Elizabeth Cardoza  2. Review this list and we will build on this   3. Return to see Star Celeste in 2 weeks. `  All or Nothing Thinking refers to your tendency to evaluate your personal qualities in extreme, black or white categories. E.g. Straight A student who receives a B on an exam concludes \"now I'm a failure\". All or nothing thinking forms the basis for perfectionism. It causes you to fear any mistake or imperfection because you will then see yourself as a complete loser, and you feel inadequate and worthless. This way of life is unrealistic because life is rarely completely either one way or the other. For example, no one is absolutely brilliant or totally stupid. Absolutes do not exist in this universe. If you try to force your experiences into absolutes categories, you will be constantly depressed because your perception will not conform to reality. You will set yourself up for discrediting yourself endlessly because whatever you do will never measure up to your exaggerated expectations. Overgeneralization  When you overgeneralize, you arbitrarily conclude that one thing that happened to you once will occur over and over again. Since what happened is invariable unpleasant, you feel upset. E.g.  A shy, young man mustered up his courage to ask a girl for a date. When she politely declined because of a previous engagement, he said to himself, \"I'm never going to get a date. No girl would ever want a date with me. I'll be lonely and miserable for the rest of my life. \"      In his distorted cognitions, he concluded that because she turned him down once, she would always do so, and that since all women have 100 percent identical tastes, he would endlessnessly and repeatedly be rejected by any eligible woman on the face of the earth.     Mental Filter is when you pick out a negative detail in any situation and dwell on it exclusively, thus perceiving that the whole situation is negative. E.g. A depressed young college student overheard some other students making fun of her best friend. She became furious because she was thinking \"That is what the human race is basically like-cruel and insensitive! \"    She was overlooking the fact that in the previous months, few people, if any, had been cruel or insensitive to her. When you are depressed, you wear a pair of eyeglasses with special lenses that filter out any positive. All that you allow to enter your conscious mind is negative. Because you are not aware of this \"filtering process\", you conclude that everything is negative. This is known as \"selective abstraction\". It is a bad habit that will cause you to suffer much needless anguish. Disqualifying the Positive is the persistent tendency of some individuals to transform neutral or even positive experiences into negative ones. You don't just ignore positive experiences, you cleverly and swiftly turn them into nightmarish opposites. E.g. An everyday example of this would be the way most of us have been conditioned to respond to compliments. When someone praises your appearance or your work, you might automatically tell yourself, \"they're just being nice. \"  With one quiroz blow, you mentally disqualify their compliment. You do the same thing when you tell them, \"Oh, it was nothing, really. \"    If you constantly throw cold water on the good things that happen, no wonder life seems damp and chilly to you! Disqualifying the positive is one of the most destructive forms of cognitive distortions. Whenever you have a negative experience, you dwell on it and conclude \"That proves what I've known all along. \"  In contrast, when you have a positive experience, you tell yourself, \"that was a fluke, it doesn't count! \".     The price you pay for this tendency is intense misery and an inability to -fulfilling prophecy and set up a negative interaction in a relationship when none exists in the first place. Jumping to Conclusions is when you arbitrarily jump to a negative conclusion that is not justified by the facts of the situation. \"fortune telling error\" is as if you had a crystal ball that foretold misery for you. You imagine that something bad is about to happen, and you take this prediction as a fact even though it is unrealistic.      E.g.  A  repeatedly told herself during anxiety attacks, \"I'm going to pass out or go crazy. \"  These predictions were unrealistic because she had never once passed out (or gone crazy!) in her entire life. Nor did she have any serious symptoms to suggest impending insanity. This negative prediction about her prognosis caused her to feel hopeless and out of control. Magnification and Minimization or \"binocular trick\" is when you are either blowing things up out of proportion or shrinking them. Magnification usually occurs when you look at your own errors, fears, or imperfections and exaggerate their importance. E.g. \"My God-I made a mistake. How terrible. My reputation will be ruined! \"      You are looking at your faults through the end of binoculars that makes them appear gigantic and grotesque. This is also called \"catastrophizing\" because you turn commonplace negative events into nightmarish monsters. When you think about your strengths, you may do the opposite- look through the wrong end of the binoculars so that things look small and unimportant. If you magnify your imperfections and minimize your good points, you're guaranteed to feel inferior. The problem isn't you-it's the crazy lens you're wearing! Emotional Reasoning is when you take your emotions as evidence for the truth. Your logic \"I feel like dud, therefore I am a dud! \"  This kind of reasoning is misleading because your feelings reflect your thoughts and beliefs. If they are distorted, as is quite often the case, your emotions will have no validity. E.g  \"I feel guilty. Therefore, I must have done something bad. \"  \"I feel overwhelmed and hopeless. Therefore my problems must be impossible to solve. \"  \"I'm not in the mood to do anything. Therefore, I might as well just lie in bed. \"  Or \"I'm mad at you. This proves that you've been acting rotten and trying to take advantage of me. \"    Emotional reasoning plays a role in nearly all of your depressions. Because things feel so negative to you, you assume they truly are. It doesn't occur to you to challenge the validity of the perceptions that create your feelings. One unusual side effect of emotional reasoning is procrastination. You avoid cleaning up your house because you tell yourself, \"I feel lousy when I think about that messy house, cleaning it will be impossible. \"  Six months later you finally give yourself a little push and do it. It turns out to be quite gratifying and not so tough after all. You were fooling yourself all along becaue you are in the habit of letting your negative feelings guide the way you act. Should Statements is when you try to motivate yourself by saying \"I should do this\" or \"I must do that. \"  These statements cause you to feel pressured and resentful. Paradoxically, you end up feeling apathetic and unmotivated. Should statements generate a lot of unnecessary emotional turmoil in your daily life. When the reality of your own behavior falls short of your standards, your shoulds and shouldn'ts create self loathing, shame and guilt. When the all-too-human performance of other people falls short of your expectations, as will inevitably happen from time to time, you'll feel bitter and self righteous. [facility] either have to change your expectations to approximate reality or always feel let down by human behavior.  When you direct should statements towards others, you will usually feel frustrated. Labeling and Mislabeling. Personal labeling means creating a completely negative self image based on your errors. It's an extreme form of overgeneralization. There is a good chance you are involved in a personal labeling whenever you describe your mistakes with sentences beginning with \"I'm a . Delwyn Scarce Delwyn Scarce \"      E.g. When you miss your putt on the eighteenth hole, you might say \"I'm a born loser\" instead of \"I goofed up on my putt. \"      Labeling yourself is not only self defeating, it is irrational.  Your self cannot be equated with any one thing you do. Your life is a complex and ever-changing flow and thoughts, emotions, and actions. Stop trying to define yourself with negative labels-they are overly simplistic and wrong. Would you think of yourself as an \"eater\" simply because you eat. When you label other people, you will invariably generate hostility.      E.g. A boss who sees his occasionally irritable  as Ralene Schwab uncooperative bitch. \"  Because of this label, he resents her and jumps at every chance to criticize her. She in turn, labels him as \"insensitive chauvinist\" and complains about him at every opportunity. So, around and around they go at each other's throats, focusing on every weakness or imperfection as proof of the other's worthlessness. Mislabeling involves describing an event with words that are inaccurate and emotionally heavily loaded. For example, a woman on a diet ate a dish of ice cream and thought \"how disgusting and repulsive of me. I'm a pig. \" These thoughts made her so upset she ate the whole quart of ice cream.

## 2020-09-16 ENCOUNTER — VIRTUAL VISIT (OUTPATIENT)
Dept: PSYCHOLOGY | Age: 30
End: 2020-09-16
Payer: COMMERCIAL

## 2020-09-16 PROCEDURE — 90832 PSYTX W PT 30 MINUTES: CPT | Performed by: SOCIAL WORKER

## 2020-09-16 NOTE — PROGRESS NOTES
Contact: Rizwan Emerson  Address: [address] Phone: [phone]  Mobile Phone: [phone]  Relation: Parent     Provider location: Himanshu Martinez:  Pt endorses that she is surviving. She has been seeing Yanez Bonds with [facility] and she is benefitting from this. First appt was just to get to know, last week they talked a lot about the wedding and thye discussed slowing down and being more in the moment. They recognize she often just goes through the motions. She realizes she has been avoiding for so long and this also diminished her ability to feel raphael. Glad she is working on her issues.         O:  MSE:    Appearance: good hygiene   Attitude: cooperative and friendly  Consciousness: alert  Orientation: oriented to person, place, time, general circumstance  Memory: recent and remote memory intact  Attention/Concentration: intact during session  Psychomotor Activity:normal  Eye Contact: normal  Speech: normal rate and volume, well-articulated  Mood: euthymic  Affect: euthymic  Perception: within normal limits  Thought Content: within normal limits  Thought Process: logical, coherent and goal-directed  Insight: good  Judgment: intact  Ability to understand instructions: Yes  Ability to respond meaningfully: Yes  Morbid Ideation: no   Suicide Assessment: no suicidal ideation, plan, or intent  Homicidal Ideation: no    History:    Medications:   Current Outpatient Medications   Medication Sig Dispense Refill    ibuprofen (ADVIL;MOTRIN) 800 MG tablet Take 1 tablet by mouth every 8 hours as needed for Pain 30 tablet 0    Prenatal Vit-Fe Fumarate-FA ( PRENATAL MULTIVITAMINS) 28-0.8 MG TABS Take 1 tablet by mouth daily 90 tablet 3     Current Facility-Administered Medications   Medication Dose Route Frequency Provider Last Rate Last Dose    levonorgestrel (MIRENA) IUD 52 mg 1 each  1 each Intrauterine Once Emani Cantu DO Social History:   Social History     Socioeconomic History    Marital status: Single     Spouse name: Not on file    Number of children: Not on file    Years of education: Not on file    Highest education level: Not on file   Occupational History    Occupation: RN   Social Needs    Financial resource strain: Not on file    Food insecurity     Worry: Not on file     Inability: Not on file   [employer] needs     Medical: Not on file     Non-medical: Not on file   Tobacco Use    Smoking status: Never Smoker    Smokeless tobacco: Never Used   Substance and Sexual Activity    Alcohol use: Not Currently     Alcohol/week: 0.0 standard drinks     Comment: FEW TIMES PER MONTH    Drug use: No    Sexual activity: Yes     Partners: Male   Lifestyle    Physical activity     Days per week: Not on file     Minutes per session: Not on file    Stress: Not on file   Relationships    Social connections     Talks on phone: Not on file     Gets together: Not on file     Attends Nondenominational service: Not on file     Active member of club or organization: Not on file     Attends meetings of clubs or organizations: Not on file     Relationship status: Not on file    Intimate partner violence     Fear of current or ex partner: Not on file     Emotionally abused: Not on file     Physically abused: Not on file     Forced sexual activity: Not on file   Other Topics Concern    Not on file   Social History Narrative    Not on file     TOBACCO:   reports that she has never smoked. She has never used smokeless tobacco.  ETOH:   reports previous alcohol use.   Family History:   Family History   Problem Relation Age of Onset    Diabetes Father     High Blood Pressure Father     High Cholesterol Father     Cancer Maternal Aunt         breast    Uterine Cancer Maternal Aunt     Cancer Maternal Aunt 79        breast    No Known Problems Paternal Grandfather     Heart Attack Paternal Grandmother     Stroke Maternal Grandmother     No Known Problems Maternal Grandfather     Depression Mother     High Blood Pressure Mother     Other Brother         severe seasonal allergies       A:  Pt endorses therapy with [facility] is going very well. No SI/HI, insight and motivation good. Diagnosis:    1. DEMETRICE (generalized anxiety disorder)    2.  Postpartum depression          Diagnosis Date    Abnormal Pap smear of cervix     Hydronephrosis 6/26/2013    Mild postpartum depression 6/5/2020    Obesity (BMI 30.0-34.9) 6/16/2014    Renal stone 6/26/2013    Varicella      Plan:  Pt interventions:  Trained in strategies for increasing balanced thinking, Discussed and set plan for behavioral activation, Discussed self-care (sleep, nutrition, rewarding activities, social support, exercise), Discussed benefits of referral for specialty care, Motivational Interviewing to increase patient confidence and compliance with adhering to behavioral change plan, Motivational Interviewing to determine importance and readiness for change and Provided pt book recommendation    Pt Behavioral Change Plan:   See Pt Instructions

## 2020-10-19 ENCOUNTER — VIRTUAL VISIT (OUTPATIENT)
Dept: PSYCHOLOGY | Age: 30
End: 2020-10-19
Payer: COMMERCIAL

## 2020-10-19 PROCEDURE — 90832 PSYTX W PT 30 MINUTES: CPT | Performed by: SOCIAL WORKER

## 2020-10-19 NOTE — PROGRESS NOTES
Behavioral Health Consultation  Ab Amaral. UlisesOLIVIA  10/19/2020  4:34 PM EDT      Time spent with Patient: 30 minutes  This is patient's sixth [facility] appointment. Reason for Consult:    Chief Complaint   Patient presents with    Anxiety    Depression     Referring Provider: Kerry Pritchard DO  [address]  [address]    Feedback given to PCP. TELEHEALTH VISIT -- Audio/Visual (During [facility])  }  Pursuant to the emergency declaration under the [facility], [facility] waiver authority and the [employer] Act, this Virtual Visit was conducted, with patient's consent, to reduce the patient's risk of exposure to COVID-19 and provide continuity of care for an established patient. Services were provided through a video synchronous discussion virtually to substitute for in-person clinic visit. Pt gave verbal informed consent to participate in telehealth services. Conducted a risk-benefit analysis and determined that the patient's presenting problems are consistent with the use of telepsychology. Determined that the patient has sufficient knowledge and skills in the use of technology enabling them to adequately benefit from telepsychology. It was determined that this patient was able to be properly treated without an in-person session. Patient verified that they were currently located at the [facility] address that was provided during registration.     Verified the following information:  Patient's identification: Yes  Patient location: [facility] [address]   Patient's call back number: [phone]   Patient's emergency contact's name and number, as well as permission to contact them if needed: Extended Emergency Contact Information  Primary Emergency Contact: Davin Kraft, [address] [address] Phone: [phone]  Relation: Domestic Partner  Secondary Emergency Contact: Rizwan Emerson  Address: [address] Phone: [phone]  Mobile Phone: [phone]  Relation: Parent     Provider location: Bernadette Grant:  Pt endorses being more stressed today. She went back to work after being off following her honeymoon. 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 She is very upset that they are not having her do virtuals as they are all set upto do this and requiring her to come in the to the office. Son had possible exposure at school so has to be home. She and  just got back from [place] for honeymoon, they had a great time. Enjoyed the city. She was very impressed with how well the city reacted to covid with mask wearing and rules. Still seeing Leigh Ann Bermudez and enjoying this. Now that she is back from honeymoon she suspects they will begin getting more deeply into emptions.      O:  MSE:    Appearance: good hygiene   Attitude: cooperative and friendly  Consciousness: alert  Orientation: oriented to person, place, time, general circumstance  Memory: recent and remote memory intact  Attention/Concentration: intact during session  Psychomotor Activity:normal  Eye Contact: normal  Speech: normal rate and volume, well-articulated  Mood: anxious  Affect: anxious  Perception: within normal limits  Thought Content: within normal limits  Thought Process: logical, coherent and goal-directed  Insight: good  Judgment: intact  Ability to understand instructions: Yes  Ability to respond meaningfully: Yes  Morbid Ideation: no   Suicide Assessment: no suicidal ideation, plan, or intent  Homicidal Ideation: no    History:    Medications:   Current Outpatient Medications   Medication Sig Dispense Refill    ibuprofen (ADVIL;MOTRIN) 800 MG tablet Take 1 tablet by mouth every 8 hours as needed for Pain 30 tablet 0    Prenatal Vit-Fe Fumarate-FA (KP PRENATAL MULTIVITAMINS) 28-0.8 MG TABS Take 1 tablet by mouth daily 90 tablet 3     Current Facility-Administered Medications   Medication Dose Route Frequency Provider Last Rate Last Dose    levonorgestrel (MIRENA) IUD 52 mg 1 each  1 each Intrauterine Once Judi Reed DO         Social History:   Social History     Socioeconomic History    Marital status: Single     Spouse name: Not on file    Number of children: Not on file    Years of education: Not on file    Highest education level: Not on file   Occupational History    Occupation: RN   Social Needs    Financial resource strain: Not on file    Food insecurity     Worry: Not on file     Inability: Not on file   [employer] needs     Medical: Not on file     Non-medical: Not on file   Tobacco Use    Smoking status: Never Smoker    Smokeless tobacco: Never Used   Substance and Sexual Activity    Alcohol use: Not Currently     Alcohol/week: 0.0 standard drinks     Comment: FEW TIMES PER MONTH    Drug use: No    Sexual activity: Yes     Partners: Male   Lifestyle    Physical activity     Days per week: Not on file     Minutes per session: Not on file    Stress: Not on file   Relationships    Social connections     Talks on phone: Not on file     Gets together: Not on file     Attends Adventist service: Not on file     Active member of club or organization: Not on file     Attends meetings of clubs or organizations: Not on file     Relationship status: Not on file    Intimate partner violence     Fear of current or ex partner: Not on file     Emotionally abused: Not on file     Physically abused: Not on file     Forced sexual activity: Not on file   Other Topics Concern    Not on file   Social History Narrative    Not on file     TOBACCO:   reports that she has never smoked. She has never used smokeless tobacco.  ETOH:   reports previous alcohol use.   Family History:   Family History   Problem Relation Age of Onset    Diabetes Father     High Blood Pressure Father  High Cholesterol Father     Cancer Maternal Aunt         breast    Uterine Cancer Maternal Aunt     Cancer Maternal Aunt 79        breast    No Known Problems Paternal Grandfather     Heart Attack Paternal Grandmother     Stroke Maternal Grandmother     No Known Problems Maternal Grandfather     Depression Mother     High Blood Pressure Mother     Other Brother         severe seasonal allergies       A:  Pt endorses anxiety with recent coworkers testing positive for covid. Doing well in specialty mental health. No SI/HI, insight and motivation good. Diagnosis:    1. DEMETRICE (generalized anxiety disorder)    2. Postpartum depression    3.  Adjustment disorder with mixed anxiety and depressed mood          Diagnosis Date    Abnormal Pap smear of cervix     Hydronephrosis 6/26/2013    Mild postpartum depression 6/5/2020    Obesity (BMI 30.0-34.9) 6/16/2014    Renal stone 6/26/2013    Varicella      Plan:  Pt interventions:  Trained in strategies for increasing balanced thinking, Discussed and set plan for behavioral activation, Discussed self-care (sleep, nutrition, rewarding activities, social support, exercise), Discussed benefits of referral for specialty care, Motivational Interviewing to increase patient confidence and compliance with adhering to behavioral change plan, Motivational Interviewing to determine importance and readiness for change, Supportive techniques and Emphasized self-care as important for managing overall health    Pt Behavioral Change Plan:   See Pt Instructions

## 2020-12-07 ENCOUNTER — VIRTUAL VISIT (OUTPATIENT)
Dept: PSYCHOLOGY | Age: 30
End: 2020-12-07
Payer: COMMERCIAL

## 2020-12-07 PROCEDURE — 90834 PSYTX W PT 45 MINUTES: CPT | Performed by: SOCIAL WORKER

## 2020-12-07 NOTE — PROGRESS NOTES
Behavioral Health Consultation  Truman Cartagena. BellevilleOLIVIA  12/12/2020  4:56 PM EST      Time spent with Patient: 45 minutes  This is patient's seventh [facility] appointment. Reason for Consult:    Chief Complaint   Patient presents with    Anxiety    Depression     Referring Provider: Carroll Mendoza DO  [address]  [address]    Feedback given to PCP. TELEHEALTH VISIT -- Audio/Visual (During XRHQV-98 public health emergency)  }  Pursuant to the emergency declaration under the [address] [place] [address], [facility] waiver authority and the [employer] and [employer] Act, this Virtual Visit was conducted, with patient's consent, to reduce the patient's risk of exposure to COVID-19 and provide continuity of care for an established patient. Services were provided through a video synchronous discussion virtually to substitute for in-person clinic visit. Pt gave verbal informed consent to participate in telehealth services. Conducted a risk-benefit analysis and determined that the patient's presenting problems are consistent with the use of telepsychology. Determined that the patient has sufficient knowledge and skills in the use of technology enabling them to adequately benefit from telepsychology. It was determined that this patient was able to be properly treated without an in-person session. Patient verified that they were currently located at the [facility] address that was provided during registration.     Verified the following information:  Patient's identification: Yes  Patient location: [facility] [address]   Patient's call back number: [phone]   Patient's emergency contact's name and number, as well as permission to contact them if needed: Extended Emergency Contact Information  Primary Emergency Contact: Skylar Regalado, [address] [address] Phone: [phone]  Relation: Domestic Partner Secondary Emergency Contact: Rizwan Emerson  Address: [address] Ary Poole of [address] Phone: [phone]  Mobile Phone: [phone]  Relation: Parent     Provider location: Bernie Carey:  Pt endorses that she is doing okay. She started on lexipro but did not find this to be a good fit, so [facility]NP with [facility] and also is seeing Hortencia with [facility] and this has been really helpful. Still stressed with job situation and finding out that the company will be merging. Has a job as of now, but always concern with merger. Family doing well. Supportive .      O:  MSE:    Appearance: good hygiene   Attitude: cooperative and friendly  Consciousness: alert  Orientation: oriented to person, place, time, general circumstance  Memory: recent and remote memory intact  Attention/Concentration: intact during session  Psychomotor Activity:normal  Eye Contact: normal  Speech: normal rate and volume, well-articulated  Mood: anxious  Affect: euthymic  Perception: within normal limits  Thought Content: within normal limits  Thought Process: logical, coherent and goal-directed  Insight: good  Judgment: intact  Ability to understand instructions: Yes  Ability to respond meaningfully: Yes  Morbid Ideation: no   Suicide Assessment: no suicidal ideation, plan, or intent  Homicidal Ideation: no    History:    Medications:   Current Outpatient Medications   Medication Sig Dispense Refill    ibuprofen (ADVIL;MOTRIN) 800 MG tablet Take 1 tablet by mouth every 8 hours as needed for Pain 30 tablet 0    Prenatal Vit-Fe Fumarate-FA ( PRENATAL MULTIVITAMINS) 28-0.8 MG TABS Take 1 tablet by mouth daily 90 tablet 3     Current Facility-Administered Medications   Medication Dose Route Frequency Provider Last Rate Last Admin    levonorgestrel (MIRENA) IUD 52 mg 1 each  1 each Intrauterine Once Anna Elvin, DO         Social History:   Social History  No Known Problems Maternal Grandfather     Depression Mother     High Blood Pressure Mother     Other Brother         severe seasonal allergies       A:  Pt endorses mood is anxious, company is merging. Doing well in specialty mental health. No SI/HI, insight and motivation good. Diagnosis:    1.  DEMETRICE (generalized anxiety disorder)          Diagnosis Date    Abnormal Pap smear of cervix     Hydronephrosis 6/26/2013    Mild postpartum depression 6/5/2020    Obesity (BMI 30.0-34.9) 6/16/2014    Renal stone 6/26/2013    Varicella      Plan:  Pt interventions:  Trained in strategies for increasing balanced thinking, Discussed and set plan for behavioral activation, Discussed self-care (sleep, nutrition, rewarding activities, social support, exercise), Discussed benefits of referral for specialty care, Motivational Interviewing to increase patient confidence and compliance with adhering to behavioral change plan, Motivational Interviewing to determine importance and readiness for change, Supportive techniques and Emphasized self-care as important for managing overall health    Pt Behavioral Change Plan:   See Pt Instructions

## 2021-01-18 ENCOUNTER — VIRTUAL VISIT (OUTPATIENT)
Dept: PSYCHOLOGY | Age: 31
End: 2021-01-18
Payer: COMMERCIAL

## 2021-01-18 DIAGNOSIS — F43.23 ADJUSTMENT DISORDER WITH MIXED ANXIETY AND DEPRESSED MOOD: ICD-10-CM

## 2021-01-18 DIAGNOSIS — F41.1 GAD (GENERALIZED ANXIETY DISORDER): Primary | ICD-10-CM

## 2021-01-18 PROCEDURE — 90832 PSYTX W PT 30 MINUTES: CPT | Performed by: SOCIAL WORKER

## 2021-01-18 NOTE — PATIENT INSTRUCTIONS
1.  I will be in touch about [employer] insurance  2. Check out [employer] with Starlet Son  3. [employer] with Rosey Needle  4. Return to see Clary Callahan in 4 weeks.

## 2021-01-18 NOTE — PROGRESS NOTES
Behavioral Health Consultation  Heather Mcdaniel STACY Montgomery-S  1/18/2021  4:59 PM EST      Time spent with Patient: 30 minutes  This is patient's eighth [facility] appointment. Reason for Consult:    Chief Complaint   Patient presents with    Anxiety    Depression     Referring Provider: Wilfred Eaton DO  [address]  [address]    Feedback given to PCP. TELEHEALTH VISIT -- Audio/Visual (During CIJLG-42 public health emergency)  }  Pursuant to the emergency declaration under the [address] [place] [address], [facility] waiver authority and the [employer] and [employer] Act, this Virtual Visit was conducted, with patient's consent, to reduce the patient's risk of exposure to COVID-19 and provide continuity of care for an established patient. Services were provided through a video synchronous discussion virtually to substitute for in-person clinic visit. Pt gave verbal informed consent to participate in telehealth services. Conducted a risk-benefit analysis and determined that the patient's presenting problems are consistent with the use of telepsychology. Determined that the patient has sufficient knowledge and skills in the use of technology enabling them to adequately benefit from telepsychology. It was determined that this patient was able to be properly treated without an in-person session. Patient verified that they were currently located at the [facility] address that was provided during registration.     Verified the following information:  Patient's identification: Yes  Patient location: [address] [place] [address]   Patient's call back number: [phone]   Patient's emergency contact's name and number, as well as permission to contact them if needed: Extended Emergency Contact Information  Primary Emergency Contact: Sherrie Dorado, [address] [address] Phone: [phone]  Relation: Domestic Partner Secondary Emergency Contact: Rizwan Emerson  Address: [address] Phone: [phone]  Mobile Phone: [phone]  Relation: Parent     Provider location: Levy Llanes:  Pt endorses that she is doing okay. Her son caught covid about a week and half before Vicente. Pt had  had to quarantine following his illness, so they were quarantined for almost a month. She started on prozac with the ARNP at [facility]. She is a lot less anxious but still just feels off. She is not feeling like she did before. Having a lot less anxious thoughts but is still feeling down. Her daughter that she placed up for adoption had a birthday in Nov so this could be a trigger. She also thinks that not hearing from the adopted parents may have affected her mood.  Would drea to explore another therapist.     O:  MSE:    Appearance: good hygiene   Attitude: cooperative and friendly  Consciousness: alert  Orientation: oriented to person, place, time, general circumstance  Memory: recent and remote memory intact  Attention/Concentration: intact during session  Psychomotor Activity:normal  Eye Contact: normal  Speech: normal rate and volume, well-articulated  Mood: anxious and depressed  Affect: anxious  Perception: within normal limits  Thought Content: within normal limits  Thought Process: logical, coherent and goal-directed  Insight: good  Judgment: intact  Ability to understand instructions: Yes  Ability to respond meaningfully: Yes  Morbid Ideation: no   Suicide Assessment: no suicidal ideation, plan, or intent  Homicidal Ideation: no    History:    Medications:   Current Outpatient Medications   Medication Sig Dispense Refill    ibuprofen (ADVIL;MOTRIN) 800 MG tablet Take 1 tablet by mouth every 8 hours as needed for Pain 30 tablet 0    Prenatal Vit-Fe Fumarate-FA ( PRENATAL MULTIVITAMINS) 28-0.8 MG TABS Take 1 tablet by mouth daily 90 tablet 3 Current Facility-Administered Medications   Medication Dose Route Frequency Provider Last Rate Last Admin    levonorgestrel (MIRENA) IUD 52 mg 1 each  1 each Intrauterine Once Charly Hernandez,          Social History:   Social History     Socioeconomic History    Marital status: Single     Spouse name: Not on file    Number of children: Not on file    Years of education: Not on file    Highest education level: Not on file   Occupational History    Occupation: RN   Social Needs    Financial resource strain: Not on file    Food insecurity     Worry: Not on file     Inability: Not on file   [employer] needs     Medical: Not on file     Non-medical: Not on file   Tobacco Use    Smoking status: Never Smoker    Smokeless tobacco: Never Used   Substance and Sexual Activity    Alcohol use: Not Currently     Alcohol/week: 0.0 standard drinks     Comment: FEW TIMES PER MONTH    Drug use: No    Sexual activity: Yes     Partners: Male   Lifestyle    Physical activity     Days per week: Not on file     Minutes per session: Not on file    Stress: Not on file   Relationships    Social connections     Talks on phone: Not on file     Gets together: Not on file     Attends Restorationist service: Not on file     Active member of club or organization: Not on file     Attends meetings of clubs or organizations: Not on file     Relationship status: Not on file    Intimate partner violence     Fear of current or ex partner: Not on file     Emotionally abused: Not on file     Physically abused: Not on file     Forced sexual activity: Not on file   Other Topics Concern    Not on file   Social History Narrative    Not on file     TOBACCO:   reports that she has never smoked. She has never used smokeless tobacco.  ETOH:   reports previous alcohol use.   Family History:   Family History   Problem Relation Age of Onset    Diabetes Father     High Blood Pressure Father     High Cholesterol Father

## 2021-02-15 ENCOUNTER — VIRTUAL VISIT (OUTPATIENT)
Dept: PSYCHOLOGY | Age: 31
End: 2021-02-15
Payer: COMMERCIAL

## 2021-02-15 DIAGNOSIS — F41.1 GAD (GENERALIZED ANXIETY DISORDER): Primary | ICD-10-CM

## 2021-02-15 PROCEDURE — 90832 PSYTX W PT 30 MINUTES: CPT | Performed by: SOCIAL WORKER

## 2021-02-15 NOTE — PATIENT INSTRUCTIONS
1.  [place] with [employer]  2. Enrrique Garcia [phone] minds at [facility]  3.   Return to see Lynsey Street when your get new benefits

## 2021-02-15 NOTE — PROGRESS NOTES
Behavioral Health Consultation  OLIVIA Sotelo  2/15/2021  5:20 PM EST      Time spent with Patient: 30 minutes  This is patient's ninth [facility] appointment. Reason for Consult:    Chief Complaint   Patient presents with    Anxiety    Depression     Referring Provider: Denise De Jesus DO  [address] [place] [address]    Feedback given to PCP. TELEHEALTH VISIT -- Audio/Visual (During NNWUU-86 public health emergency)  }  Pursuant to the emergency declaration under the [address] [place] [address], [facility] authority and the [employer] and [employer] Act, this Virtual Visit was conducted, with patient's consent, to reduce the patient's risk of exposure to COVID-19 and provide continuity of care for an established patient. Services were provided through a video synchronous discussion virtually to substitute for in-person clinic visit. Pt gave verbal informed consent to participate in telehealth services. Conducted a risk-benefit analysis and determined that the patient's presenting problems are consistent with the use of telepsychology. Determined that the patient has sufficient knowledge and skills in the use of technology enabling them to adequately benefit from telepsychology. It was determined that this patient was able to be properly treated without an in-person session. Patient verified that they were currently located at the [facility] address that was provided during registration.     Verified the following information:  Patient's identification: Yes  Patient location: [facility] [id number]   Patient's call back number: [phone]   Patient's emergency contact's name and number, as well as permission to contact them if needed: Extended Emergency Contact Information  Primary Emergency Contact: Maryse Hernandez, [address] Phone: [phone]  Relation: Domestic Partner Secondary Emergency Contact: Rizwan Emerson  Address: [address] Phone: [phone]  Mobile Phone: [phone]  Relation: Parent     Provider location: Abraham Pina:  Pt endorses that she is doing well overall. 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 She will be working from home and then will be at the new location on [place], so will be much closer, more money and bigger organization with advancements. She is still meeting with Marilou Spence and feels she has gotten what she can from the appts.  Would like to explore another therapist.       O:  MSE:    Appearance: good hygiene   Attitude: cooperative and friendly  Consciousness: alert  Orientation: oriented to person, place, time, general circumstance  Memory: recent and remote memory intact  Attention/Concentration: intact during session  Psychomotor Activity:normal  Eye Contact: normal  Speech: normal rate and volume, well-articulated  Mood: anxious  Affect: euthymic  Perception: within normal limits  Thought Content: within normal limits  Thought Process: logical, coherent and goal-directed  Insight: good  Judgment: intact  Ability to understand instructions: Yes  Ability to respond meaningfully: Yes  Morbid Ideation: no   Suicide Assessment: no suicidal ideation, plan, or intent  Homicidal Ideation: no    History:    Medications:   Current Outpatient Medications   Medication Sig Dispense Refill    ibuprofen (ADVIL;MOTRIN) 800 MG tablet Take 1 tablet by mouth every 8 hours as needed for Pain 30 tablet 0    Prenatal Vit-Fe Fumarate-FA (KP PRENATAL MULTIVITAMINS) 28-0.8 MG TABS Take 1 tablet by mouth daily 90 tablet 3     Current Facility-Administered Medications   Medication Dose Route Frequency Provider Last Rate Last Admin

## 2021-04-12 ENCOUNTER — VIRTUAL VISIT (OUTPATIENT)
Dept: PSYCHOLOGY | Age: 31
End: 2021-04-12
Payer: COMMERCIAL

## 2021-04-12 DIAGNOSIS — F41.1 GAD (GENERALIZED ANXIETY DISORDER): Primary | ICD-10-CM

## 2021-04-12 PROCEDURE — 90832 PSYTX W PT 30 MINUTES: CPT | Performed by: SOCIAL WORKER

## 2021-04-12 NOTE — PROGRESS NOTES
Behavioral Health Consultation  Zuri Jamil. DibervilleOLIVIA colby  4/12/2021  3:54 PM EDT      Time spent with Patient: 30 minutes  This is patient's tenth [facility] appointment. Reason for Consult:    Chief Complaint   Patient presents with    Anxiety    Depression     Referring Provider: Mariely Hightower DO  [address]  [address] [address]  Jayy Wilks    Feedback given to PCP. TELEHEALTH VISIT -- Audio/Visual (During VFNFT-62 public health emergency)  }  Pursuant to the emergency declaration under the [facility], [facility] waiver authority and the [employer] and [employer] Act, this Virtual Visit was conducted, with patient's consent, to reduce the patient's risk of exposure to COVID-19 and provide continuity of care for an established patient. Services were provided through a video synchronous discussion virtually to substitute for in-person clinic visit. Pt gave verbal informed consent to participate in telehealth services. Conducted a risk-benefit analysis and determined that the patient's presenting problems are consistent with the use of telepsychology. Determined that the patient has sufficient knowledge and skills in the use of technology enabling them to adequately benefit from telepsychology. It was determined that this patient was able to be properly treated without an in-person session. Patient verified that they were currently located at the [facility] address that was provided during registration.     Verified the following information:  Patient's identification: Yes  Patient location: [facility] [address]   Patient's call back number: [phone]   Patient's emergency contact's name and number, as well as permission to contact them if needed: Extended Emergency Contact Information  Primary Emergency Contact: Bere Rogers, [address] Phone: [phone]  Relation: Domestic Partner  Secondary Emergency Contact: Rizwan Emerson  Address: [address] Phone: [phone]  Mobile Phone: [phone]  Relation: Parent     Provider location: Rice Lava:  Pt endorses she is doing well overall. She is really enjoying her new job at [employer], she absolutely loves it. 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 Pt is no longer seeing [facility], didn't feel connected. Still struggling with anxiety and mood at times but still better. Pt has increased her cymbalta on her own to 60mg. Has about a 2 week supply left, will reach out to [facility] about refill and increased dose. Would like another referral for meds.       O:  MSE:    Appearance: good hygiene   Attitude: cooperative and friendly  Consciousness: alert  Orientation: oriented to person, place, time, general circumstance  Memory: recent and remote memory intact  Attention/Concentration: intact during session  Psychomotor Activity:normal  Eye Contact: normal  Speech: normal rate and volume, well-articulated  Mood: anxious   Affect: euthymic  Perception: within normal limits  Thought Content: within normal limits  Thought Process: logical, coherent and goal-directed  Insight: good  Judgment: intact  Ability to understand instructions: Yes  Ability to respond meaningfully: Yes  Morbid Ideation: no   Suicide Assessment: no suicidal ideation, plan, or intent  Homicidal Ideation: no    History:    Medications:   Current Outpatient Medications   Medication Sig Dispense Refill    ibuprofen (ADVIL;MOTRIN) 800 MG tablet Take 1 tablet by mouth every 8 hours as needed for Pain 30 tablet 0    Prenatal Vit-Fe Fumarate-FA ( PRENATAL MULTIVITAMINS) 28-0.8 MG TABS Take 1 tablet by mouth daily 90 tablet 3     Current Facility-Administered Medications   Medication Dose Route Frequency Provider Last Rate Last Admin    levonorgestrel (MIRENA) IUD 52 mg 1 each  1 each Intrauterine Once Tariq Sanchez DO Alexa         Social History:   Social History     Socioeconomic History    Marital status: Single     Spouse name: Not on file    Number of children: Not on file    Years of education: Not on file    Highest education level: Not on file   Occupational History    Occupation: RN   Social Needs    Financial resource strain: Not on file    Food insecurity     Worry: Not on file     Inability: Not on file   [employer] needs     Medical: Not on file     Non-medical: Not on file   Tobacco Use    Smoking status: Never Smoker    Smokeless tobacco: Never Used   Substance and Sexual Activity    Alcohol use: Not Currently     Alcohol/week: 0.0 standard drinks     Comment: FEW TIMES PER MONTH    Drug use: No    Sexual activity: Yes     Partners: Male   Lifestyle    Physical activity     Days per week: Not on file     Minutes per session: Not on file    Stress: Not on file   Relationships    Social connections     Talks on phone: Not on file     Gets together: Not on file     Attends Zoroastrianism service: Not on file     Active member of club or organization: Not on file     Attends meetings of clubs or organizations: Not on file     Relationship status: Not on file    Intimate partner violence     Fear of current or ex partner: Not on file     Emotionally abused: Not on file     Physically abused: Not on file     Forced sexual activity: Not on file   Other Topics Concern    Not on file   Social History Narrative    Not on file     TOBACCO:   reports that she has never smoked. She has never used smokeless tobacco.  ETOH:   reports previous alcohol use.   Family History:   Family History   Problem Relation Age of Onset    Diabetes Father     High Blood Pressure Father     High Cholesterol Father     Cancer Maternal Aunt         breast    Uterine Cancer Maternal Aunt     Cancer Maternal Aunt 79        breast    No Known Problems Paternal Grandfather     Heart Attack Paternal Grandmother     Stroke Maternal Grandmother     No Known Problems Maternal Grandfather     Depression Mother     High Blood Pressure Mother     Other Brother         severe seasonal allergies       A:  Pt doing well with new job. Referral to [facility] [address] for psychiatry. No SI/HI, insight and motivation good. Diagnosis:    1.  DEMETRICE (generalized anxiety disorder)          Diagnosis Date    Abnormal Pap smear of cervix     Hydronephrosis 6/26/2013    Mild postpartum depression 6/5/2020    Obesity (BMI 30.0-34.9) 6/16/2014    Renal stone 6/26/2013    Varicella      Plan:  Pt interventions:  Trained in strategies for increasing balanced thinking, Discussed and set plan for behavioral activation, Discussed self-care (sleep, nutrition, rewarding activities, social support, exercise), Discussed benefits of referral for specialty care, Motivational Interviewing to increase patient confidence and compliance with adhering to behavioral change plan, Motivational Interviewing to determine importance and readiness for change and Supportive techniques    Pt Behavioral Change Plan:   See Pt Instructions

## 2021-04-26 ENCOUNTER — VIRTUAL VISIT (OUTPATIENT)
Dept: PSYCHOLOGY | Age: 31
End: 2021-04-26
Payer: COMMERCIAL

## 2021-04-26 DIAGNOSIS — F41.1 GAD (GENERALIZED ANXIETY DISORDER): Primary | ICD-10-CM

## 2021-04-26 PROCEDURE — 90832 PSYTX W PT 30 MINUTES: CPT | Performed by: SOCIAL WORKER

## 2021-04-26 NOTE — PROGRESS NOTES
Behavioral Health Consultation  Edmond Serna. CHILO MontgomeryRAMIRO  4/26/2021  4:25 PM EDT      Time spent with Patient: 30 minutes  This is patient's 11th [facility] appointment. Reason for Consult:    Chief Complaint   Patient presents with    Anxiety    Depression     Referring Provider: Missy Ferguson DO  [address]  [address] [address]  Jayy Wilks    Feedback given to PCP. TELEHEALTH VISIT -- Audio/Visual (During MXNOU-20 public health emergency)  }  Pursuant to the emergency declaration under the [facility], [facility] waiver authority and the [employer] and [employer] Act, this Virtual Visit was conducted, with patient's consent, to reduce the patient's risk of exposure to COVID-19 and provide continuity of care for an established patient. Services were provided through a video synchronous discussion virtually to substitute for in-person clinic visit. Pt gave verbal informed consent to participate in telehealth services. Conducted a risk-benefit analysis and determined that the patient's presenting problems are consistent with the use of telepsychology. Determined that the patient has sufficient knowledge and skills in the use of technology enabling them to adequately benefit from telepsychology. It was determined that this patient was able to be properly treated without an in-person session. Patient verified that they were currently located at the [facility] address that was provided during registration.     Verified the following information:  Patient's identification: Yes  Patient location: [facility] [address]   Patient's call back number: [phone]   Patient's emergency contact's name and number, as well as permission to contact them if needed: Extended Emergency Contact Information  Primary Emergency Contact: Yo Suarez [address] Phone: [phone]  Relation: Domestic Partner  Secondary Emergency of children: Not on file    Years of education: Not on file    Highest education level: Not on file   Occupational History    Occupation: RN   Social Needs    Financial resource strain: Not on file    Food insecurity     Worry: Not on file     Inability: Not on file   [employer] needs     Medical: Not on file     Non-medical: Not on file   Tobacco Use    Smoking status: Never Smoker    Smokeless tobacco: Never Used   Substance and Sexual Activity    Alcohol use: Not Currently     Alcohol/week: 0.0 standard drinks     Comment: FEW TIMES PER MONTH    Drug use: No    Sexual activity: Yes     Partners: Male   Lifestyle    Physical activity     Days per week: Not on file     Minutes per session: Not on file    Stress: Not on file   Relationships    Social connections     Talks on phone: Not on file     Gets together: Not on file     Attends Anabaptist service: Not on file     Active member of club or organization: Not on file     Attends meetings of clubs or organizations: Not on file     Relationship status: Not on file    Intimate partner violence     Fear of current or ex partner: Not on file     Emotionally abused: Not on file     Physically abused: Not on file     Forced sexual activity: Not on file   Other Topics Concern    Not on file   Social History Narrative    Not on file     TOBACCO:   reports that she has never smoked. She has never used smokeless tobacco.  ETOH:   reports previous alcohol use.   Family History:   Family History   Problem Relation Age of Onset    Diabetes Father     High Blood Pressure Father     High Cholesterol Father     Cancer Maternal Aunt         breast    Uterine Cancer Maternal Aunt     Cancer Maternal Aunt 79        breast    No Known Problems Paternal Grandfather     Heart Attack Paternal Grandmother     Stroke Maternal Grandmother     No Known Problems Maternal Grandfather     Depression Mother     High Blood Pressure Mother     Other Brother severe seasonal allergies       A:  Pt endorses feeling overwhelmed which is likely continuing to anxiety and depression. No SI/HI, insight and motivation good. Diagnosis:    1.  DEMETRICE (generalized anxiety disorder)          Diagnosis Date    Abnormal Pap smear of cervix     Hydronephrosis 6/26/2013    Mild postpartum depression 6/5/2020    Obesity (BMI 30.0-34.9) 6/16/2014    Renal stone 6/26/2013    Varicella      Plan:  Pt interventions:  Trained in strategies for increasing balanced thinking, Discussed and set plan for behavioral activation, Discussed self-care (sleep, nutrition, rewarding activities, social support, exercise), Discussed benefits of referral for specialty care and Supportive techniques    Pt Behavioral Change Plan:   See Pt Instructions

## 2021-04-26 NOTE — PATIENT INSTRUCTIONS
1.  Unlocking Us with Magnus Borges and Yen Babin  2. Consider \"I\" statements vs \"you\" statement  3. Return to see Jacky Alfaro in 2 weeks.

## 2021-05-10 ENCOUNTER — VIRTUAL VISIT (OUTPATIENT)
Dept: PSYCHOLOGY | Age: 31
End: 2021-05-10
Payer: COMMERCIAL

## 2021-05-10 DIAGNOSIS — F41.9 ANXIETY: Primary | ICD-10-CM

## 2021-05-10 PROCEDURE — 90832 PSYTX W PT 30 MINUTES: CPT | Performed by: SOCIAL WORKER

## 2021-05-10 NOTE — PATIENT INSTRUCTIONS
1.  Continue the great work with [employer]  2. Continue exercising  3. Return to see Mahi Webb in 2 weeks.

## 2021-05-10 NOTE — PROGRESS NOTES
Behavioral Health Consultation  Brock Young. Ulises OLIVIA  5/10/2021  4:41 PM EDT      Time spent with Patient: 30 minutes  This is patient's [facility]th [facility] appointment. Reason for Consult:    Chief Complaint   Patient presents with    Anxiety    Depression     Referring Provider: Aurora Elizondo DO  [address] [place] [address]    Feedback given to PCP. TELEHEALTH VISIT -- Audio/Visual (During WDBTV-54 public health emergency)  }  Pursuant to the emergency declaration under the [address] [place] [address], [facility] authority and the [employer] and [employer] Act, this Virtual Visit was conducted, with patient's consent, to reduce the patient's risk of exposure to COVID-19 and provide continuity of care for an established patient. Services were provided through a video synchronous discussion virtually to substitute for in-person clinic visit. Pt gave verbal informed consent to participate in telehealth services. Conducted a risk-benefit analysis and determined that the patient's presenting problems are consistent with the use of telepsychology. Determined that the patient has sufficient knowledge and skills in the use of technology enabling them to adequately benefit from telepsychology. It was determined that this patient was able to be properly treated without an in-person session. Patient verified that they were currently located at the [facility] address that was provided during registration.     Verified the following information:  Patient's identification: Yes  Patient location: [facility] [address]   Patient's call back number: [phone]   Patient's emergency contact's name and number, as well as permission to contact them if needed: Extended Emergency Contact Information  Primary Emergency Contact: Jamar Jiménez [address] Phone: [phone]  Relation: Domestic Partner  Secondary Emergency Contact: Rizwan Emerson  [facility] [address] [address] Phone: [phone]  Mobile Phone: [phone]  Relation: Parent     Provider location: David Cordoba:  Pt endorses that she saw [facility] last Friday and it went very well. She increased her cymbalta to 120mg and started her on . 25mg of risperdone to help with anxiety and irritability. Has been taking this since Friday and it seems to be helping. Scheduled for f/u visit on 5/28. She listened to the [place] episode and she really loved this. Doing very well and happy with her treatment plan.      O:  MSE:    Appearance: good hygiene   Attitude: cooperative and friendly  Consciousness: alert  Orientation: oriented to person, place, time, general circumstance  Memory: recent and remote memory intact  Attention/Concentration: intact during session  Psychomotor Activity:normal  Eye Contact: normal  Speech: normal rate and volume, well-articulated  Mood: euthymic  Affect: euthymic  Perception: within normal limits  Thought Content: within normal limits  Thought Process: logical, coherent and goal-directed  Insight: good  Judgment: intact  Ability to understand instructions: Yes  Ability to respond meaningfully: Yes  Morbid Ideation: no   Suicide Assessment: no suicidal ideation, plan, or intent  Homicidal Ideation: no    History:    Medications:   Current Outpatient Medications   Medication Sig Dispense Refill    ibuprofen (ADVIL;MOTRIN) 800 MG tablet Take 1 tablet by mouth every 8 hours as needed for Pain 30 tablet 0    Prenatal Vit-Fe Fumarate-FA (KP PRENATAL MULTIVITAMINS) 28-0.8 MG TABS Take 1 tablet by mouth daily 90 tablet 3     Current Facility-Administered Medications   Medication Dose Route Frequency Provider Last Rate Last Admin    levonorgestrel (MIRENA) IUD 52 mg 1 each  1 each Intrauterine Once Eleanor Trejo,          Social History:   Social History     Socioeconomic History    Marital status: Single     Spouse name: Not on file    Number of children: Not on file    Years of education: Not on file    Highest education level: Not on file   Occupational History    Occupation: RN   Social Needs    Financial resource strain: Not on file    Food insecurity     Worry: Not on file     Inability: Not on file   [employer] needs     Medical: Not on file     Non-medical: Not on file   Tobacco Use    Smoking status: Never Smoker    Smokeless tobacco: Never Used   Substance and Sexual Activity    Alcohol use: Not Currently     Alcohol/week: 0.0 standard drinks     Comment: FEW TIMES PER MONTH    Drug use: No    Sexual activity: Yes     Partners: Male   Lifestyle    Physical activity     Days per week: Not on file     Minutes per session: Not on file    Stress: Not on file   Relationships    Social connections     Talks on phone: Not on file     Gets together: Not on file     Attends Yarsani service: Not on file     Active member of club or organization: Not on file     Attends meetings of clubs or organizations: Not on file     Relationship status: Not on file    Intimate partner violence     Fear of current or ex partner: Not on file     Emotionally abused: Not on file     Physically abused: Not on file     Forced sexual activity: Not on file   Other Topics Concern    Not on file   Social History Narrative    Not on file     TOBACCO:   reports that she has never smoked. She has never used smokeless tobacco.  ETOH:   reports previous alcohol use.   Family History:   Family History   Problem Relation Age of Onset    Diabetes Father     High Blood Pressure Father     High Cholesterol Father     Cancer Maternal Aunt         breast    Uterine Cancer Maternal Aunt     Cancer Maternal Aunt 79        breast    No Known Problems Paternal Grandfather     Heart Attack Paternal Grandmother     Stroke Maternal Grandmother     No Known Problems Maternal Grandfather     Depression Mother     High Blood Pressure Mother     Other Brother         severe seasonal allergies       A:  Pt doing very well. Mood stable. Seeing psychiatry. No SI/HI, insight and motivation good. Diagnosis:    1.  Anxiety          Diagnosis Date    Abnormal Pap smear of cervix     Hydronephrosis 6/26/2013    Mild postpartum depression 6/5/2020    Obesity (BMI 30.0-34.9) 6/16/2014    Renal stone 6/26/2013    Varicella      Plan:  Pt interventions:  Provided education on the use of medication to treat  depression and anxiety, Trained in strategies for increasing balanced thinking, Discussed self-care (sleep, nutrition, rewarding activities, social support, exercise), Discussed benefits of referral for specialty care and Supportive techniques    Pt Behavioral Change Plan:   See Pt Instructions

## 2021-06-09 ENCOUNTER — VIRTUAL VISIT (OUTPATIENT)
Dept: PSYCHOLOGY | Age: 31
End: 2021-06-09
Payer: COMMERCIAL

## 2021-06-09 DIAGNOSIS — F41.9 ANXIETY: Primary | ICD-10-CM

## 2021-06-09 PROCEDURE — 90832 PSYTX W PT 30 MINUTES: CPT | Performed by: SOCIAL WORKER

## 2021-06-09 NOTE — PROGRESS NOTES
Behavioral Health Consultation  Andrew Jovel. OLIVIA Montgomery  6/9/2021  4:56 PM EDT      Time spent with Patient: 30 minutes  This is patient's 13th [facility] appointment. Reason for Consult:    Chief Complaint   Patient presents with    Anxiety    Depression     Referring Provider: Bakari Ugalde DO  [address]  [address]    Feedback given to PCP. TELEHEALTH VISIT -- Audio/Visual (During XPOFQ-70 public health emergency)  }  Pursuant to the emergency declaration under the [address] [place] [address], [facility] waiver authority and the [employer] and [employer] Act, this Virtual Visit was conducted, with patient's consent, to reduce the patient's risk of exposure to COVID-19 and provide continuity of care for an established patient. Services were provided through a video synchronous discussion virtually to substitute for in-person clinic visit. Pt gave verbal informed consent to participate in telehealth services. Conducted a risk-benefit analysis and determined that the patient's presenting problems are consistent with the use of telepsychology. Determined that the patient has sufficient knowledge and skills in the use of technology enabling them to adequately benefit from telepsychology. It was determined that this patient was able to be properly treated without an in-person session. Patient verified that they were currently located at the [facility] address that was provided during registration.     Verified the following information:  Patient's identification: Yes  Patient location: [facility] [address]   Patient's call back number: [phone]   Patient's emergency contact's name and number, as well as permission to contact them if needed: Extended Emergency Contact Information  Primary Emergency Contact: Sander Uriel [address] [facility] [address] Phone: [phone]  Relation: Domestic Partner  Secondary Emergency Contact: History:   Social History     Socioeconomic History    Marital status:      Spouse name: Not on file    Number of children: Not on file    Years of education: Not on file    Highest education level: Not on file   Occupational History    Occupation: RN   Tobacco Use    Smoking status: Never Smoker    Smokeless tobacco: Never Used   Vaping Use    Vaping Use: Never used   Substance and Sexual Activity    Alcohol use: Not Currently     Alcohol/week: 0.0 standard drinks     Comment: FEW TIMES PER MONTH    Drug use: No    Sexual activity: Yes     Partners: Male   Other Topics Concern    Not on file   Social History Narrative    Not on file     Social Determinants of Health     Financial Resource Strain:     Difficulty of Paying Living Expenses:    Food Insecurity:     Worried About Running Out of Food in the Last Year:     [address] in the Last Year:    Transportation Needs:     Lack of Transportation (Medical):  Lack of Transportation (Non-Medical):    Physical Activity:     Days of Exercise per Week:     Minutes of Exercise per Session:    Stress:     Feeling of Stress :    Social Connections:     Frequency of Communication with Friends and Family:     Frequency of Social Gatherings with Friends and Family:     Attends Religion Services:     Active Member of Clubs or Organizations:     Attends Club or Organization Meetings:     Marital Status:    Intimate Partner Violence:     Fear of Current or Ex-Partner:     Emotionally Abused:     Physically Abused:     Sexually Abused:      TOBACCO:   reports that she has never smoked. She has never used smokeless tobacco.  ETOH:   reports previous alcohol use.   Family History:   Family History   Problem Relation Age of Onset    Diabetes Father     High Blood Pressure Father     High Cholesterol Father     Cancer Maternal Aunt         breast    Uterine Cancer Maternal Aunt     Cancer Maternal Aunt 79        breast    No Known Problems Paternal Grandfather     Heart Attack Paternal Grandmother     Stroke Maternal Grandmother     No Known Problems Maternal Grandfather     Depression Mother     High Blood Pressure Mother     Other Brother         severe seasonal allergies       A:  Pt struggling a little with mood but may be connected to fatigue and anxiety and medication. No SI/HI, insight and motivation good. Diagnosis:    1.  Anxiety          Diagnosis Date    Abnormal Pap smear of cervix     Hydronephrosis 6/26/2013    Mild postpartum depression 6/5/2020    Obesity (BMI 30.0-34.9) 6/16/2014    Renal stone 6/26/2013    Varicella      Plan:  Pt interventions:  Trained in strategies for increasing balanced thinking, Discussed and set plan for behavioral activation, Discussed self-care (sleep, nutrition, rewarding activities, social support, exercise) and Supportive techniques    Pt Behavioral Change Plan:   See Pt Instructions

## 2021-06-09 NOTE — PATIENT INSTRUCTIONS
1. Check with Monica if okay to miss sim if taking too late  2. Return to see Opal Zhang in 4 weeks.

## 2021-07-07 ENCOUNTER — VIRTUAL VISIT (OUTPATIENT)
Dept: PSYCHOLOGY | Age: 31
End: 2021-07-07
Payer: COMMERCIAL

## 2021-07-07 DIAGNOSIS — F41.9 ANXIETY: Primary | ICD-10-CM

## 2021-07-07 PROCEDURE — 90832 PSYTX W PT 30 MINUTES: CPT | Performed by: SOCIAL WORKER

## 2021-07-07 NOTE — PROGRESS NOTES
Behavioral Health Consultation  Truman Cartagena. UlisesOLIVIA  7/7/2021  5:06 PM EDT      Time spent with Patient: 30 minutes  This is patient's 14th [facility] appointment. Reason for Consult:    Chief Complaint   Patient presents with    Depression    Anxiety     Referring Provider: Christiano Butts DO  [address]  [address] [address]  Jayy Wilks    Feedback given to PCP. TELEHEALTH VISIT -- Audio/Visual (During XQLFR-85 public health emergency)  }  Pursuant to the emergency declaration under the [facility] waiver authority and the [employer] and [employer] Act, this Virtual Visit was conducted, with patient's consent, to reduce the patient's risk of exposure to COVID-19 and provide continuity of care for an established patient. Services were provided through a video synchronous discussion virtually to substitute for in-person clinic visit. Pt gave verbal informed consent to participate in telehealth services. Conducted a risk-benefit analysis and determined that the patient's presenting problems are consistent with the use of telepsychology. Determined that the patient has sufficient knowledge and skills in the use of technology enabling them to adequately benefit from telepsychology. It was determined that this patient was able to be properly treated without an in-person session. Patient verified that they were currently located at the [facility] address that was provided during registration.     Verified the following information:  Patient's identification: Yes  Patient location: [facility] [address]   Patient's call back number: [phone]   Patient's emergency contact's name and number, as well as permission to contact them if needed: Extended Emergency Contact Information  Primary Emergency Contact: Skylar Regalado, [address] Phone: [phone]  Relation: Domestic Partner  Secondary Emergency Contact: Rizwan Emerson  [facility] [place] of [address] Phone: [phone]  Mobile Phone: [phone]  Relation: Parent     Provider location: Sunshine Miller:  Pt is doing okay overall, has had a few rough days every once in a while. At times struggles with being overwhelmed with baby. Having some issues  with David Coronel and has had strange feelings of resentment. May tie into having to give up daughter for adoption and issues with shame. Parents never spoke about the adoption. Many strong emotions connected to all of this.      O:  MSE:    Appearance: good hygiene   Attitude: cooperative and friendly  Consciousness: alert  Orientation: oriented to person, place, time, general circumstance  Memory: recent and remote memory intact  Attention/Concentration: intact during session  Psychomotor Activity:normal  Eye Contact: normal  Speech: normal rate and volume, well-articulated  Mood: anxious and depressed  Affect: euthymic  Perception: within normal limits  Thought Content: within normal limits  Thought Process: logical, coherent and goal-directed  Insight: good  Judgment: intact  Ability to understand instructions: Yes  Ability to respond meaningfully: Yes  Morbid Ideation: no   Suicide Assessment: no suicidal ideation, plan, or intent  Homicidal Ideation: no    History:    Medications:   Current Outpatient Medications   Medication Sig Dispense Refill    ibuprofen (ADVIL;MOTRIN) 800 MG tablet Take 1 tablet by mouth every 8 hours as needed for Pain 30 tablet 0    Prenatal Vit-Fe Fumarate-FA ( PRENATAL MULTIVITAMINS) 28-0.8 MG TABS Take 1 tablet by mouth daily 90 tablet 3     Current Facility-Administered Medications   Medication Dose Route Frequency Provider Last Rate Last Admin    levonorgestrel (MIRENA) IUD 52 mg 1 each  1 each Intrauterine Once Sole Bagley DO         Social History:   Social History     Socioeconomic History    Marital status:      Spouse name: Not on file    Number of children: Not on file    Years of education: Not on file    Highest education level: Not on file   Occupational History    Occupation: RN   Tobacco Use    Smoking status: Never Smoker    Smokeless tobacco: Never Used   Vaping Use    Vaping Use: Never used   Substance and Sexual Activity    Alcohol use: Not Currently     Alcohol/week: 0.0 standard drinks     Comment: FEW TIMES PER MONTH    Drug use: No    Sexual activity: Yes     Partners: Male   Other Topics Concern    Not on file   Social History Narrative    Not on file     Social Determinants of Health     Financial Resource Strain:     Difficulty of Paying Living Expenses:    Food Insecurity:     Worried About Running Out of Food in the Last Year:     [address] in the Last Year:    Transportation Needs:     Lack of Transportation (Medical):  Lack of Transportation (Non-Medical):    Physical Activity:     Days of Exercise per Week:     Minutes of Exercise per Session:    Stress:     Feeling of Stress :    Social Connections:     Frequency of Communication with Friends and Family:     Frequency of Social Gatherings with Friends and Family:     Attends Episcopalian Services:     Active Member of Clubs or Organizations:     Attends Club or Organization Meetings:     Marital Status:    Intimate Partner Violence:     Fear of Current or Ex-Partner:     Emotionally Abused:     Physically Abused:     Sexually Abused:      TOBACCO:   reports that she has never smoked. She has never used smokeless tobacco.  ETOH:   reports previous alcohol use.   Family History:   Family History   Problem Relation Age of Onset    Diabetes Father     High Blood Pressure Father     High Cholesterol Father     Cancer Maternal Aunt         breast    Uterine Cancer Maternal Aunt     Cancer Maternal Aunt 79        breast    No Known Problems Paternal Grandfather     Heart Attack Paternal Grandmother     Stroke Maternal Grandmother     No Known Problems Maternal Grandfather     Depression Mother     High Blood Pressure Mother     Other Brother         severe seasonal allergies       A:  Pt doing well overall, with some mood lability at time. No SI/HI, insight and motivation good. Diagnosis:    1.  Anxiety          Diagnosis Date    Abnormal Pap smear of cervix     Hydronephrosis 6/26/2013    Mild postpartum depression 6/5/2020    Obesity (BMI 30.0-34.9) 6/16/2014    Renal stone 6/26/2013    Varicella      Plan:  Pt interventions:  Trained in strategies for increasing balanced thinking, Discussed and set plan for behavioral activation, Trained in improving communication skills, Discussed self-care (sleep, nutrition, rewarding activities, social support, exercise), Supportive techniques and Identified maladaptive thoughts    Pt Behavioral Change Plan:   See Pt Instructions

## 2021-07-07 NOTE — PATIENT INSTRUCTIONS
1.  Rasheeda Perel \"where should we begin\" sexless and \"motherless women\"  2.  [employer] podcast with Saurabh and Dr. Thaddeus Thompson   3. Return to see Samuel in  weeks.

## 2021-07-26 ENCOUNTER — VIRTUAL VISIT (OUTPATIENT)
Dept: PSYCHOLOGY | Age: 31
End: 2021-07-26
Payer: COMMERCIAL

## 2021-07-26 DIAGNOSIS — F41.9 ANXIETY: Primary | ICD-10-CM

## 2021-07-26 PROCEDURE — 90832 PSYTX W PT 30 MINUTES: CPT | Performed by: SOCIAL WORKER

## 2021-07-26 NOTE — PROGRESS NOTES
Behavioral Health Consultation  Jojo Cardenas. STACY Montgomery-S  7/26/2021  4:48 PM EDT      Time spent with Patient: 30 minutes  This is patient's 15th [facility] appointment. Reason for Consult:    Chief Complaint   Patient presents with    Anxiety    Depression     Referring Provider: Dawit Rosenthal DO  [address]  [address] [address]  [facility]    Feedback given to PCP. TELEHEALTH VISIT -- Audio/Visual (During BXYMW-72 public health emergency)  }  Pursuant to the emergency declaration under the [facility] waiver authority and the [employer] and [employer] Act, this Virtual Visit was conducted, with patient's consent, to reduce the patient's risk of exposure to COVID-19 and provide continuity of care for an established patient. Services were provided through a video synchronous discussion virtually to substitute for in-person clinic visit. Pt gave verbal informed consent to participate in telehealth services. Conducted a risk-benefit analysis and determined that the patient's presenting problems are consistent with the use of telepsychology. Determined that the patient has sufficient knowledge and skills in the use of technology enabling them to adequately benefit from telepsychology. It was determined that this patient was able to be properly treated without an in-person session. Patient verified that they were currently located at the [facility] address that was provided during registration.     Verified the following information:  Patient's identification: Yes  Patient location: [facility] [address]   Patient's call back number: [phone]   Patient's emergency contact's name and number, as well as permission to contact them if needed: Extended Emergency Contact Information  Primary Emergency Contact: Loni West, [address] Phone: [phone]  Relation: Domestic Partner  Secondary Emergency Contact: Rizwan Emerson  Address: [address] Phone: [phone]  Mobile Phone: [phone]  Relation: Parent     Provider location: Bernadette Lo:  Pt endorses that she is planning a vacation to [facility], however her  is not vaccinated and this is creating some anxiety with the delta variant being so prevalent. She listened to podcast with [employer] and thought it was terrific, made perfect sense. She talked about how she was shamed by the [employer] and her family when she was pregnant outside of wedlock. This has been so difficult to manage and she realizes now how impactful this was on her life. At times does not feel that she is deserving of happiness, much as a result of the shame she feels.      O:  MSE:    Appearance: good hygiene   Attitude: cooperative and friendly  Consciousness: alert  Orientation: oriented to person, place, time, general circumstance  Memory: recent and remote memory intact  Attention/Concentration: intact during session  Psychomotor Activity:normal  Eye Contact: normal  Speech: normal rate and volume, well-articulated  Mood: anxious and depressed  Affect: euthymic  Perception: within normal limits  Thought Content: within normal limits  Thought Process: logical, coherent and goal-directed  Insight: good  Judgment: intact  Ability to understand instructions: Yes  Ability to respond meaningfully: Yes  Morbid Ideation: no   Suicide Assessment: no suicidal ideation, plan, or intent  Homicidal Ideation: no    History:    Medications:   Current Outpatient Medications   Medication Sig Dispense Refill    ibuprofen (ADVIL;MOTRIN) 800 MG tablet Take 1 tablet by mouth every 8 hours as needed for Pain 30 tablet 0    Prenatal Vit-Fe Fumarate-FA ( PRENATAL MULTIVITAMINS) 28-0.8 MG TABS Take 1 tablet by mouth daily 90 tablet 3     Current Facility-Administered Medications   Medication Dose Route Frequency Provider Last Rate Last Admin    levonorgestrel (MIRENA) IUD 52 mg 1 each  1 each Intrauterine Once Juanitael Osman,          Social History:   Social History     Socioeconomic History    Marital status:      Spouse name: Not on file    Number of children: Not on file    Years of education: Not on file    Highest education level: Not on file   Occupational History    Occupation: RN   Tobacco Use    Smoking status: Never Smoker    Smokeless tobacco: Never Used   Vaping Use    Vaping Use: Never used   Substance and Sexual Activity    Alcohol use: Not Currently     Alcohol/week: 0.0 standard drinks     Comment: FEW TIMES PER MONTH    Drug use: No    Sexual activity: Yes     Partners: Male   Other Topics Concern    Not on file   Social History Narrative    Not on file     Social Determinants of Health     Financial Resource Strain:     Difficulty of Paying Living Expenses:    Food Insecurity:     Worried About Running Out of Food in the Last Year:     [address] in the Last Year:    Transportation Needs:     Lack of Transportation (Medical):  Lack of Transportation (Non-Medical):    Physical Activity:     Days of Exercise per Week:     Minutes of Exercise per Session:    Stress:     Feeling of Stress :    Social Connections:     Frequency of Communication with Friends and Family:     Frequency of Social Gatherings with Friends and Family:     Attends Taoism Services:     Active Member of Clubs or Organizations:     Attends Club or Organization Meetings:     Marital Status:    Intimate Partner Violence:     Fear of Current or Ex-Partner:     Emotionally Abused:     Physically Abused:     Sexually Abused:      TOBACCO:   reports that she has never smoked. She has never used smokeless tobacco.  ETOH:   reports previous alcohol use.   Family History:   Family History   Problem Relation Age of Onset    Diabetes Father     High Blood Pressure Father     High Cholesterol Father     Cancer Maternal Aunt         breast    Uterine Cancer Maternal Aunt     Cancer Maternal Aunt 79        breast    No Known Problems Paternal Grandfather     Heart Attack Paternal Grandmother     Stroke Maternal Grandmother     No Known Problems Maternal Grandfather     Depression Mother     High Blood Pressure Mother     Other Brother         severe seasonal allergies       A:  Pt discussed struggles from her past, and moments where she was shamed for having a child out of wedlock. Trouble being compassionate towards self. No SI/HI, insight and motivation good. Diagnosis:    1.  Anxiety          Diagnosis Date    Abnormal Pap smear of cervix     Hydronephrosis 6/26/2013    Mild postpartum depression 6/5/2020    Obesity (BMI 30.0-34.9) 6/16/2014    Renal stone 6/26/2013    Varicella      Plan:  Pt interventions:  Trained in strategies for increasing balanced thinking, Discussed and set plan for behavioral activation, Trained in improving communication skills, Discussed self-care (sleep, nutrition, rewarding activities, social support, exercise) and Supportive techniques    Pt Behavioral Change Plan:   See Pt Instructions

## 2021-07-26 NOTE — PATIENT INSTRUCTIONS
1. UnF&^ck Your Brain  2. Check out [facility]  3. Return to see Clemencia Stockton in 3 weeks.

## 2021-08-12 ENCOUNTER — OFFICE VISIT (OUTPATIENT)
Dept: FAMILY MEDICINE CLINIC | Age: 31
End: 2021-08-12
Payer: COMMERCIAL

## 2021-08-12 VITALS
HEIGHT: 64 IN | WEIGHT: 226 LBS | SYSTOLIC BLOOD PRESSURE: 120 MMHG | HEART RATE: 83 BPM | OXYGEN SATURATION: 99 % | DIASTOLIC BLOOD PRESSURE: 84 MMHG | BODY MASS INDEX: 38.58 KG/M2

## 2021-08-12 DIAGNOSIS — Z00.00 PHYSICAL EXAM, ANNUAL: Primary | ICD-10-CM

## 2021-08-12 DIAGNOSIS — Z76.89 ENCOUNTER TO ESTABLISH CARE: ICD-10-CM

## 2021-08-12 PROCEDURE — 99395 PREV VISIT EST AGE 18-39: CPT | Performed by: PHYSICIAN ASSISTANT

## 2021-08-12 RX ORDER — RISPERIDONE 0.25 MG/1
0.25 TABLET, FILM COATED ORAL DAILY
COMMUNITY
Start: 2021-07-29 | End: 2022-02-18

## 2021-08-12 RX ORDER — BREXPIPRAZOLE 1 MG/1
1 TABLET ORAL DAILY
COMMUNITY
Start: 2021-07-14 | End: 2022-02-18

## 2021-08-12 RX ORDER — DULOXETIN HYDROCHLORIDE 60 MG/1
60 CAPSULE, DELAYED RELEASE ORAL 2 TIMES DAILY
COMMUNITY
Start: 2021-07-15 | End: 2022-02-18

## 2021-08-12 SDOH — ECONOMIC STABILITY: FOOD INSECURITY: WITHIN THE PAST 12 MONTHS, THE FOOD YOU BOUGHT JUST DIDN'T LAST AND YOU DIDN'T HAVE MONEY TO GET MORE.: NEVER TRUE

## 2021-08-12 SDOH — ECONOMIC STABILITY: FOOD INSECURITY: WITHIN THE PAST 12 MONTHS, YOU WORRIED THAT YOUR FOOD WOULD RUN OUT BEFORE YOU GOT MONEY TO BUY MORE.: NEVER TRUE

## 2021-08-12 ASSESSMENT — ENCOUNTER SYMPTOMS
BLOOD IN STOOL: 0
WHEEZING: 0
SHORTNESS OF BREATH: 0
ABDOMINAL PAIN: 0
DIARRHEA: 0
COLOR CHANGE: 0

## 2021-08-12 ASSESSMENT — SOCIAL DETERMINANTS OF HEALTH (SDOH): HOW HARD IS IT FOR YOU TO PAY FOR THE VERY BASICS LIKE FOOD, HOUSING, MEDICAL CARE, AND HEATING?: NOT HARD AT ALL

## 2021-08-12 NOTE — PROGRESS NOTES
2021    Davida Calvo (:  1990) is a 27 y.o. female, here for a preventive medicine evaluation. Patient Active Problem List   Diagnosis    Obesity (BMI 30-39. 9)    History of cervical dysplasia    IUD (intrauterine device) in place    Mild postpartum depression    Lactating mother     Current specialist:  [facility]  Currently in treatment for depression and anxiety  Current medication: rexulti, cymbalta and risperdisone    Gynecologist: Bruce Worthy, Dr. Berry Alonso: wears glasses and contacts, milo  Dentist: no concerns  Diet: very picky eater, stress eater, does not eat red, eats fish and chicken, drinks a gallon of water per day  Exercise: walks 2-3 miles 3-4 nights per week    Review of Systems   Constitutional: Negative for diaphoresis and unexpected weight change. Eyes: Negative for visual disturbance. Respiratory: Negative for shortness of breath and wheezing. Cardiovascular: Negative for chest pain, palpitations and leg swelling. Gastrointestinal: Negative for abdominal pain, blood in stool and diarrhea. Endocrine: Negative for polydipsia, polyphagia and polyuria. Genitourinary: Negative for hematuria. Skin: Negative for color change. Neurological: Negative for dizziness, light-headedness and headaches. Psychiatric/Behavioral: Positive for dysphoric mood. The patient is nervous/anxious. Prior to Visit Medications    Medication Sig Taking?  Authorizing Provider   risperiDONE (RISPERDAL) 0.25 MG tablet Take 0.25 mg by mouth daily  Yes Historical Provider, MD SANCHEZULTI 1 MG TABS tablet 1 mg daily  Yes Historical Provider, MD   DULoxetine (CYMBALTA) 60 MG extended release capsule Take 60 mg by mouth 2 times daily  Yes Historical Provider, MD   Multiple Vitamin (MULTIVITAMIN ADULT PO) Take by mouth Yes Historical Provider, MD        Allergies   Allergen Reactions    Augmentin [Amoxicillin-Pot Clavulanate] Rash Past Medical History:   Diagnosis Date    Abnormal Pap smear of cervix     Hydronephrosis 6/26/2013    Mild postpartum depression 6/5/2020    Obesity (BMI 30.0-34.9) 6/16/2014    Renal stone 6/26/2013    Varicella        Past Surgical History:   Procedure Laterality Date    ADENOIDECTOMY      LEEP  2012    TONSILLECTOMY      TYMPANOSTOMY TUBE PLACEMENT      WISDOM TOOTH EXTRACTION         Social History     Socioeconomic History    Marital status:      Spouse name: Not on file    Number of children: Not on file    Years of education: Not on file    Highest education level: Not on file   Occupational History    Occupation: RN   Tobacco Use    Smoking status: Never Smoker    Smokeless tobacco: Never Used   Vaping Use    Vaping Use: Never used   Substance and Sexual Activity    Alcohol use: Yes     Alcohol/week: 0.0 standard drinks     Comment: FEW TIMES PER MONTH    Drug use: No    Sexual activity: Yes     Partners: Male   Other Topics Concern    Not on file   Social History Narrative    Not on file     Social Determinants of Health     Financial Resource Strain: Low Risk     Difficulty of Paying Living Expenses: Not hard at all   Food Insecurity: No Food Insecurity    Worried About Running Out of Food in the Last Year: Never true    Glendy of Food in the Last Year: Never true   Transportation Needs:     Lack of Transportation (Medical):      Lack of Transportation (Non-Medical):    Physical Activity:     Days of Exercise per Week:     Minutes of Exercise per Session:    Stress:     Feeling of Stress :    Social Connections:     Frequency of Communication with Friends and Family:     Frequency of Social Gatherings with Friends and Family:     Attends Spiritism Services:     Active Member of Clubs or Organizations:     Attends Club or Organization Meetings:     Marital Status:    Intimate Partner Violence:     Fear of Current or Ex-Partner:     Emotionally Abused:  Physically Abused:     Sexually Abused:         Family History   Problem Relation Age of Onset    Diabetes Father     High Blood Pressure Father     High Cholesterol Father     Cancer Maternal Aunt         breast    Uterine Cancer Maternal Aunt     Breast Cancer Maternal Aunt     Cancer Maternal Aunt 79        breast    No Known Problems Paternal Grandfather     Heart Attack Paternal Grandmother     Stroke Maternal Grandmother     No Known Problems Maternal Grandfather     Depression Mother     High Blood Pressure Mother     Other Brother         severe seasonal allergies       ADVANCE DIRECTIVE: N, <no information>    Vitals:    08/12/21 1522   BP: 120/84   Pulse: 83   SpO2: 99%   Weight: 226 lb (102.5 kg)   Height: 5' 4\" (1.626 m)     Estimated body mass index is 38.79 kg/m² as calculated from the following:    Height as of this encounter: 5' 4\" (1.626 m). Weight as of this encounter: 226 lb (102.5 kg). Physical Exam  Vitals reviewed. Constitutional:       Appearance: Normal appearance. HENT:      Head: Normocephalic and atraumatic. Eyes:      Conjunctiva/sclera: Conjunctivae normal.   Cardiovascular:      Rate and Rhythm: Normal rate and regular rhythm. Heart sounds: Normal heart sounds. Pulmonary:      Effort: Pulmonary effort is normal.      Breath sounds: Normal breath sounds. Abdominal:      General: Bowel sounds are normal.      Palpations: Abdomen is soft. Musculoskeletal:      Cervical back: Normal range of motion. Right lower leg: No edema. Left lower leg: No edema. Neurological:      Mental Status: She is alert and oriented to person, place, and time. Cranial Nerves: No cranial nerve deficit. No flowsheet data found.     Lab Results   Component Value Date    CHOL 160 05/06/2019    TRIG 77 05/06/2019    HDL 49 05/06/2019    LDLCALC 96 05/06/2019    GLUCOSE 93 05/06/2019       The ASCVD Risk score (Atif Shine., et al., 2013) failed to calculate for the following reasons: The 2013 ASCVD risk score is only valid for ages 36 to 78    Immunization History   Administered Date(s) Administered    COVID-19, Moderna, PF, 100mcg/0.5mL 01/15/2021, 02/12/2021    DTP 1990, 1990, 1989, 07/24/1990    DTaP, 5 Pertussis Antigens (Daptacel) 05/14/1994    Influenza Virus Vaccine 10/03/2019, 10/15/2020    MMR 05/04/1990    PPD Test 01/31/2014, 02/14/2014, 01/13/2015, 01/28/2015    Polio OPV 1989, 1989, 07/24/1990, 05/14/1994    Tdap (Boostrix, Adacel) 01/31/2014       Health Maintenance   Topic Date Due    Hepatitis C screen  Never done    Cervical cancer screen  08/12/2020    Flu vaccine (1) 09/01/2021    DTaP/Tdap/Td vaccine (7 - Td or Tdap) 01/31/2024    COVID-19 Vaccine  Completed    HIV screen  Completed    Hepatitis A vaccine  Aged Out    Hepatitis B vaccine  Aged Out    Hib vaccine  Aged Out    Meningococcal (ACWY) vaccine  Aged Out    Pneumococcal 0-64 years Vaccine  Aged Out    Varicella vaccine  Discontinued          ASSESSMENT/PLAN:  1. Physical exam, annual  -     LIPID PANEL; Future  -     COMPREHENSIVE METABOLIC PANEL; Future  -     CBC Auto Differential; Future  -     TSH with Reflex; Future  2. Encounter to establish care        -     Reviewed records and history with the patient    Return if symptoms worsen or fail to improve. An electronic signature was used to authenticate this note.     --CONNIE Ibrahim on 8/12/2021 at 4:31 PM

## 2021-08-13 ENCOUNTER — OFFICE VISIT (OUTPATIENT)
Dept: OBGYN CLINIC | Age: 31
End: 2021-08-13
Payer: COMMERCIAL

## 2021-08-13 ENCOUNTER — HOSPITAL ENCOUNTER (OUTPATIENT)
Age: 31
Discharge: HOME | End: 2021-08-13
Payer: COMMERCIAL

## 2021-08-13 VITALS
HEIGHT: 64 IN | SYSTOLIC BLOOD PRESSURE: 124 MMHG | WEIGHT: 224.4 LBS | BODY MASS INDEX: 38.31 KG/M2 | TEMPERATURE: 97 F | DIASTOLIC BLOOD PRESSURE: 76 MMHG | HEART RATE: 80 BPM

## 2021-08-13 DIAGNOSIS — Z01.419 WOMEN'S ANNUAL ROUTINE GYNECOLOGICAL EXAMINATION: Primary | ICD-10-CM

## 2021-08-13 DIAGNOSIS — Z00.00 PHYSICAL EXAM, ANNUAL: ICD-10-CM

## 2021-08-13 DIAGNOSIS — Z87.410 HISTORY OF CERVICAL DYSPLASIA: ICD-10-CM

## 2021-08-13 DIAGNOSIS — Z12.4 PAP SMEAR FOR CERVICAL CANCER SCREENING: ICD-10-CM

## 2021-08-13 DIAGNOSIS — E66.9 OBESITY (BMI 30-39.9): ICD-10-CM

## 2021-08-13 DIAGNOSIS — Z97.5 IUD (INTRAUTERINE DEVICE) IN PLACE: ICD-10-CM

## 2021-08-13 LAB
A/G RATIO: 1.8 (ref 1.1–2.2)
ALBUMIN SERPL-MCNC: 4.6 G/DL (ref 3.4–5)
ALP BLD-CCNC: 79 U/L (ref 40–129)
ALT SERPL-CCNC: 9 U/L (ref 10–40)
ANION GAP SERPL CALCULATED.3IONS-SCNC: 12 MMOL/L (ref 3–16)
AST SERPL-CCNC: 15 U/L (ref 15–37)
BASOPHILS ABSOLUTE: 0 K/UL (ref 0–0.2)
BASOPHILS RELATIVE PERCENT: 0.6 %
BILIRUB SERPL-MCNC: 1.3 MG/DL (ref 0–1)
BUN BLDV-MCNC: 9 MG/DL (ref 7–20)
CALCIUM SERPL-MCNC: 9.7 MG/DL (ref 8.3–10.6)
CHLORIDE BLD-SCNC: 104 MMOL/L (ref 99–110)
CHOLESTEROL, TOTAL: 199 MG/DL (ref 0–199)
CO2: 25 MMOL/L (ref 21–32)
CREAT SERPL-MCNC: 0.6 MG/DL (ref 0.6–1.1)
EOSINOPHILS ABSOLUTE: 0.3 K/UL (ref 0–0.6)
EOSINOPHILS RELATIVE PERCENT: 5 %
GFR AFRICAN AMERICAN: >60
GFR NON-AFRICAN AMERICAN: >60
GLOBULIN: 2.6 G/DL
GLUCOSE BLD-MCNC: 88 MG/DL (ref 70–99)
HCT VFR BLD CALC: 43.5 % (ref 36–48)
HDLC SERPL-MCNC: 54 MG/DL (ref 40–60)
HEMOGLOBIN: 14.8 G/DL (ref 12–16)
LDL CHOLESTEROL CALCULATED: 125 MG/DL
LYMPHOCYTES ABSOLUTE: 2.2 K/UL (ref 1–5.1)
LYMPHOCYTES RELATIVE PERCENT: 40.8 %
MCH RBC QN AUTO: 32.1 PG (ref 26–34)
MCHC RBC AUTO-ENTMCNC: 34.1 G/DL (ref 31–36)
MCV RBC AUTO: 94 FL (ref 80–100)
MONOCYTES ABSOLUTE: 0.4 K/UL (ref 0–1.3)
MONOCYTES RELATIVE PERCENT: 6.7 %
NEUTROPHILS ABSOLUTE: 2.6 K/UL (ref 1.7–7.7)
NEUTROPHILS RELATIVE PERCENT: 46.9 %
PDW BLD-RTO: 12.7 % (ref 12.4–15.4)
PLATELET # BLD: 304 K/UL (ref 135–450)
PMV BLD AUTO: 8.5 FL (ref 5–10.5)
POTASSIUM SERPL-SCNC: 4.2 MMOL/L (ref 3.5–5.1)
RBC # BLD: 4.62 M/UL (ref 4–5.2)
SODIUM BLD-SCNC: 141 MMOL/L (ref 136–145)
TOTAL PROTEIN: 7.2 G/DL (ref 6.4–8.2)
TRIGL SERPL-MCNC: 100 MG/DL (ref 0–150)
TSH REFLEX: 1.41 UIU/ML (ref 0.27–4.2)
VLDLC SERPL CALC-MCNC: 20 MG/DL
WBC # BLD: 5.5 K/UL (ref 4–11)

## 2021-08-13 PROCEDURE — 80061 LIPID PANEL: CPT

## 2021-08-13 PROCEDURE — 80053 COMPREHEN METABOLIC PANEL: CPT

## 2021-08-13 PROCEDURE — 36415 COLL VENOUS BLD VENIPUNCTURE: CPT

## 2021-08-13 PROCEDURE — 84443 ASSAY THYROID STIM HORMONE: CPT

## 2021-08-13 PROCEDURE — 99395 PREV VISIT EST AGE 18-39: CPT | Performed by: OBSTETRICS & GYNECOLOGY

## 2021-08-13 PROCEDURE — 85025 COMPLETE CBC W/AUTO DIFF WBC: CPT

## 2021-08-20 ENCOUNTER — VIRTUAL VISIT (OUTPATIENT)
Dept: PSYCHOLOGY | Age: 31
End: 2021-08-20
Payer: COMMERCIAL

## 2021-08-20 DIAGNOSIS — F41.9 ANXIETY: ICD-10-CM

## 2021-08-20 DIAGNOSIS — F33.1 MDD (MAJOR DEPRESSIVE DISORDER), RECURRENT EPISODE, MODERATE (HCC): Primary | ICD-10-CM

## 2021-08-20 PROCEDURE — 90832 PSYTX W PT 30 MINUTES: CPT | Performed by: SOCIAL WORKER

## 2021-08-20 NOTE — PROGRESS NOTES
Behavioral Health Consultation  OLIVIA Horton  8/20/2021  4:23 PM EDT      Time spent with Patient: 30 minutes  This is patient's 16th [facility] appointment. Reason for Consult:    Chief Complaint   Patient presents with    Depression    Anxiety     Referring Provider: Edna Puga DO  [address]  [address] [address]  Jayy Wilks [address]    Feedback given to PCP. TELEHEALTH VISIT -- Audio/Visual (During [id number] public health emergency)  }  Pursuant to the emergency declaration under the [facility], [facility] waiver authority and the [employer] Act, this Virtual Visit was conducted, with patient's consent, to reduce the patient's risk of exposure to COVID-19 and provide continuity of care for an established patient. Services were provided through a video synchronous discussion virtually to substitute for in-person clinic visit. Pt gave verbal informed consent to participate in telehealth services. Conducted a risk-benefit analysis and determined that the patient's presenting problems are consistent with the use of telepsychology. Determined that the patient has sufficient knowledge and skills in the use of technology enabling them to adequately benefit from telepsychology. It was determined that this patient was able to be properly treated without an in-person session. Patient verified that they were currently located at the [facility] address that was provided during registration.     Verified the following information:  Patient's identification: Yes  Patient location: [facility] [address]   Patient's call back number: [phone]   Patient's emergency contact's name and number, as well as permission to contact them if needed: Extended Emergency Contact Information  Primary Emergency Contact: Papo Silvestre, [address] Phone: [phone]  Relation: Domestic Partner  Secondary Emergency Contact: Rizwan Emerson  Address: [place] [address] [place] [address] Phone: [phone]  Mobile Phone: [phone]  Relation: Parent     Provider location: [facility]:  Pt has been struggling. She recognizes that her parents were ones to avoid. She was on vacation with her family andher father got into an argument with her  and this really set her off. So difficult the way her family responds and knows they are a big part of her depression and anxiety. She saw [facility] and she changed her meds around. Does agree that a higher level of tx may be warranted.        O:  MSE:    Appearance: good hygiene   Attitude: cooperative and friendly  Consciousness: alert  Orientation: oriented to person, place, time, general circumstance  Memory: recent and remote memory intact  Attention/Concentration: intact during session  Psychomotor Activity:normal  Eye Contact: normal  Speech: normal rate and volume, well-articulated  Mood: anxious and depressed  Affect: dysphoric and anxious  Perception: within normal limits  Thought Content: within normal limits  Thought Process: logical, coherent and goal-directed  Insight: good  Judgment: intact  Ability to understand instructions: Yes  Ability to respond meaningfully: Yes  Morbid Ideation: no   Suicide Assessment: no suicidal ideation, plan, or intent  Homicidal Ideation: no    History:    Medications:   Current Outpatient Medications   Medication Sig Dispense Refill    risperiDONE (RISPERDAL) 0.25 MG tablet Take 0.25 mg by mouth daily       REXULTI 1 MG TABS tablet 1 mg daily       DULoxetine (CYMBALTA) 60 MG extended release capsule Take 60 mg by mouth 2 times daily       Multiple Vitamin (MULTIVITAMIN ADULT PO) Take by mouth       Current Facility-Administered Medications   Medication Dose Route Frequency Provider Last Rate Last Admin    levonorgestrel (MIRENA) IUD 52 mg 1 each  1 each Intrauterine Once Christiano Carbone DO         Social History:   Social History     Socioeconomic History    Marital status:      Spouse name: Not on file    Number of children: Not on file    Years of education: Not on file    Highest education level: Not on file   Occupational History    Occupation: RN   Tobacco Use    Smoking status: Never Smoker    Smokeless tobacco: Never Used   Vaping Use    Vaping Use: Never used   Substance and Sexual Activity    Alcohol use: Yes     Alcohol/week: 0.0 standard drinks     Comment: FEW TIMES PER MONTH    Drug use: No    Sexual activity: Yes     Partners: Male   Other Topics Concern    Not on file   Social History Narrative    Not on file     Social Determinants of Health     Financial Resource Strain: Low Risk     Difficulty of Paying Living Expenses: Not hard at all   Food Insecurity: No Food Insecurity    Worried About Running Out of Food in the Last Year: Never true    Glendy of Food in the Last Year: Never true   Transportation Needs:     Lack of Transportation (Medical):  Lack of Transportation (Non-Medical):    Physical Activity:     Days of Exercise per Week:     Minutes of Exercise per Session:    Stress:     Feeling of Stress :    Social Connections:     Frequency of Communication with Friends and Family:     Frequency of Social Gatherings with Friends and Family:     Attends Spiritism Services:     Active Member of Clubs or Organizations:     Attends Club or Organization Meetings:     Marital Status:    Intimate Partner Violence:     Fear of Current or Ex-Partner:     Emotionally Abused:     Physically Abused:     Sexually Abused:      TOBACCO:   reports that she has never smoked. She has never used smokeless tobacco.  ETOH:   reports current alcohol use.   Family History:   Family History   Problem Relation Age of Onset    Diabetes Father     High Blood Pressure Father     High Cholesterol Father     Cancer Maternal Aunt         breast  Uterine Cancer Maternal Aunt     Breast Cancer Maternal Aunt     Cancer Maternal Aunt 79        breast    No Known Problems Paternal Grandfather     Heart Attack Paternal Grandmother     Stroke Maternal Grandmother     No Known Problems Maternal Grandfather     Depression Mother     High Blood Pressure Mother     Other Brother         severe seasonal allergies       A:  Pt continues to struggle with mood. Pt open to referral to specialty mental health. No SI/HI, insight and motivation good. Diagnosis:    1. MDD (major depressive disorder), recurrent episode, moderate ([facility] [address]    2.  Anxiety          Diagnosis Date    Abnormal Pap smear of cervix     Hydronephrosis 6/26/2013    Mild postpartum depression 6/5/2020    Obesity (BMI 30.0-34.9) 6/16/2014    Renal stone 6/26/2013    Varicella      Plan:  Pt interventions:  Trained in strategies for increasing balanced thinking, Discussed self-care (sleep, nutrition, rewarding activities, social support, exercise), Discussed benefits of referral for specialty care, Supportive techniques and Identified maladaptive thoughts    Pt Behavioral Change Plan:   See Pt Instructions

## 2021-08-30 ENCOUNTER — VIRTUAL VISIT (OUTPATIENT)
Dept: PSYCHOLOGY | Age: 31
End: 2021-08-30
Payer: COMMERCIAL

## 2021-08-30 DIAGNOSIS — F41.9 ANXIETY: ICD-10-CM

## 2021-08-30 DIAGNOSIS — F33.1 MDD (MAJOR DEPRESSIVE DISORDER), RECURRENT EPISODE, MODERATE (HCC): Primary | ICD-10-CM

## 2021-08-30 PROCEDURE — 90832 PSYTX W PT 30 MINUTES: CPT | Performed by: SOCIAL WORKER

## 2021-08-30 NOTE — PATIENT INSTRUCTIONS
1. Dr. Irja Alcazar [phone]   2. Talk to Natalya Slider about weight gain  3. Return to see Noé Denny in 2 weeks.

## 2021-08-30 NOTE — PROGRESS NOTES
Behavioral Health Consultation  Marisol Adamson STACY Montgomery-S  8/30/2021  4:11 PM EDT      Time spent with Patient: 30 minutes  This is patient's 17th [facility] appointment. Reason for Consult:    Chief Complaint   Patient presents with    Anxiety    Depression     Referring Provider: Jaime Manzano DO  [email or web address]  [address] [address]  Jayy Wilks    Feedback given to PCP. TELEHEALTH VISIT -- Audio/Visual (During [id number] public health emergency)  }  Pursuant to the emergency declaration under the [facility] waiver authority and the [employer] Act, this Virtual Visit was conducted, with patient's consent, to reduce the patient's risk of exposure to COVID-19 and provide continuity of care for an established patient. Services were provided through a video synchronous discussion virtually to substitute for in-person clinic visit. Pt gave verbal informed consent to participate in telehealth services. Conducted a risk-benefit analysis and determined that the patient's presenting problems are consistent with the use of telepsychology. Determined that the patient has sufficient knowledge and skills in the use of technology enabling them to adequately benefit from telepsychology. It was determined that this patient was able to be properly treated without an in-person session. Patient verified that they were currently located at the [facility] address that was provided during registration.     Verified the following information:  Patient's identification: Yes  Patient location: [place] [address]   Patient's call back number: [phone]   Patient's emergency contact's name and number, as well as permission to contact them if needed: Extended Emergency Contact Information  Primary Emergency Contact: Raymond Vera, [address] Phone: [phone]  Relation: Domestic Partner  Secondary Emergency Contact: Rizwan Emerson  Address: [address] Phone: [phone]  Mobile Phone: [phone]  Relation: Parent     Provider location: [facility]:  Pt endorses that she is continuing to struggle with anxiety and depression. She has been extremely fatigued lately. She will sleep and always is feeling tired when waking.  says she snores sometimes, but denied her stopping breathing that she knows of. She feels she has gained at least 10 lbs if not more. Sheis finding herself snacking more, which she feels she is snacking may be connected to weight gain, and fatigue so ordering out more. However not sure she has changed that much with diet so was surprised how much weight she has gained. Will see psychiatry next week to discuss, as is taking abilify and respirdone. Denied thoughts of suicide.      O:  MSE:    Appearance: good hygiene   Attitude: cooperative and friendly  Consciousness: alert  Orientation: oriented to person, place, time, general circumstance  Memory: recent and remote memory intact  Attention/Concentration: intact during session  Psychomotor Activity:normal  Eye Contact: normal  Speech: normal rate and volume, well-articulated  Mood: depressed and anxious  Affect: dysphoric and anxious  Perception: within normal limits  Thought Content: within normal limits  Thought Process: logical, coherent and goal-directed  Insight: good  Judgment: intact  Ability to understand instructions: Yes  Ability to respond meaningfully: Yes  Morbid Ideation: no   Suicide Assessment: no suicidal ideation, plan, or intent  Homicidal Ideation: no    History:    Medications:   Current Outpatient Medications   Medication Sig Dispense Refill    risperiDONE (RISPERDAL) 0.25 MG tablet Take 0.25 mg by mouth daily       REXULTI 1 MG TABS tablet 1 mg daily       DULoxetine (CYMBALTA) 60 MG extended release capsule Take 60 mg by mouth 2 times daily       Multiple Vitamin (MULTIVITAMIN ADULT PO) Take by mouth       Current Facility-Administered Medications   Medication Dose Route Frequency Provider Last Rate Last Admin    levonorgestrel (MIRENA) IUD 52 mg 1 each  1 each IntraUTERine Once Venkat Rosario DO         Social History:   Social History     Socioeconomic History    Marital status:      Spouse name: Not on file    Number of children: Not on file    Years of education: Not on file    Highest education level: Not on file   Occupational History    Occupation: RN   Tobacco Use    Smoking status: Never Smoker    Smokeless tobacco: Never Used   Vaping Use    Vaping Use: Never used   Substance and Sexual Activity    Alcohol use: Yes     Alcohol/week: 0.0 standard drinks     Comment: FEW TIMES PER MONTH    Drug use: No    Sexual activity: Yes     Partners: Male   Other Topics Concern    Not on file   Social History Narrative    Not on file     Social Determinants of Health     Financial Resource Strain: Low Risk     Difficulty of Paying Living Expenses: Not hard at all   Food Insecurity: No Food Insecurity    Worried About Running Out of Food in the Last Year: Never true    Glendy of Food in the Last Year: Never true   Transportation Needs:     Lack of Transportation (Medical):  Lack of Transportation (Non-Medical):    Physical Activity:     Days of Exercise per Week:     Minutes of Exercise per Session:    Stress:     Feeling of Stress :    Social Connections:     Frequency of Communication with Friends and Family:     Frequency of Social Gatherings with Friends and Family:     Attends Buddhist Services:     Active Member of Clubs or Organizations:     Attends Club or Organization Meetings:     Marital Status:    Intimate Partner Violence:     Fear of Current or Ex-Partner:     Emotionally Abused:     Physically Abused:     Sexually Abused:      TOBACCO:   reports that she has never smoked.  She has never used smokeless tobacco.  ETOH:   reports current alcohol use. Family History:   Family History   Problem Relation Age of Onset    Diabetes Father     High Blood Pressure Father     High Cholesterol Father     Cancer Maternal Aunt         breast    Uterine Cancer Maternal Aunt     Breast Cancer Maternal Aunt     Cancer Maternal Aunt 79        breast    No Known Problems Paternal Grandfather     Heart Attack Paternal Grandmother     Stroke Maternal Grandmother     No Known Problems Maternal Grandfather     Depression Mother     High Blood Pressure Mother     Other Brother         severe seasonal allergies       A:  Pt struggling with continued depression and anxiety. Will discuss weight gain with psychiatry next week. No SI/HI, insight and motivation good. Diagnosis:    1. MDD (major depressive disorder), recurrent episode, moderate ([facility] [address]    2.  Anxiety          Diagnosis Date    Abnormal Pap smear of cervix     Hydronephrosis 6/26/2013    Mild postpartum depression 6/5/2020    Obesity (BMI 30.0-34.9) 6/16/2014    Renal stone 6/26/2013    Varicella      Plan:  Pt interventions:  Provided education on the use of medication to treat  depression and anxiety, Trained in strategies for increasing balanced thinking, Discussed and set plan for behavioral activation, Discussed self-care (sleep, nutrition, rewarding activities, social support, exercise), Discussed benefits of referral for specialty care, Supportive techniques and Identified maladaptive thoughts    Pt Behavioral Change Plan:   See Pt Instructions

## 2021-08-31 ENCOUNTER — TELEPHONE (OUTPATIENT)
Dept: FAMILY MEDICINE CLINIC | Age: 31
End: 2021-08-31

## 2021-09-01 ENCOUNTER — OFFICE VISIT (OUTPATIENT)
Dept: FAMILY MEDICINE CLINIC | Age: 31
End: 2021-09-01
Payer: COMMERCIAL

## 2021-09-01 VITALS
SYSTOLIC BLOOD PRESSURE: 110 MMHG | BODY MASS INDEX: 39.14 KG/M2 | WEIGHT: 228 LBS | DIASTOLIC BLOOD PRESSURE: 82 MMHG | HEART RATE: 93 BPM | OXYGEN SATURATION: 98 %

## 2021-09-01 DIAGNOSIS — S90.425A: Primary | ICD-10-CM

## 2021-09-01 PROCEDURE — 99213 OFFICE O/P EST LOW 20 MIN: CPT | Performed by: PHYSICIAN ASSISTANT

## 2021-09-01 RX ORDER — CLINDAMYCIN HYDROCHLORIDE 300 MG/1
300 CAPSULE ORAL 3 TIMES DAILY
Qty: 21 CAPSULE | Refills: 0 | Status: SHIPPED | OUTPATIENT
Start: 2021-09-01 | End: 2021-09-08

## 2021-09-01 RX ORDER — DOXYCYCLINE HYCLATE 100 MG/1
CAPSULE ORAL
COMMUNITY
Start: 2021-08-30 | End: 2021-09-01

## 2021-09-01 ASSESSMENT — ENCOUNTER SYMPTOMS: COLOR CHANGE: 1

## 2021-09-01 NOTE — LETTER
[address]  [facility] [address]  Phone: [phone]  Fax: [phone]    Boom Hoyt        September 1, 2021     Patient: Hailey Harris   YOB: 1990   Date of Visit: 9/1/2021       To Whom It May Concern: It is my medical opinion that Rita Valenzuela should continue to work from home until her foot is healed. She has a medical condition which prevents her from being able to wear closed toe shoes at this time. If you have any questions or concerns, please don't hesitate to call.     Sincerely,          CONNIE Hoyt

## 2021-09-01 NOTE — PROGRESS NOTES
Jovani Geronimo (:  1990) is a 27 y.o. female,Established patient, here for evaluation of the following chief complaint(s): Other (ER follow up, [facility], Blister on middle toe of left foot )         ASSESSMENT/PLAN:  1. Blister of lesser toe of left foot  -     clindamycin (CLEOCIN) 300 MG capsule; Take 1 capsule by mouth 3 times daily for 7 days, Disp-21 capsule, R-0Normal        -     Stop doxycycline. Start clindamycin. Keep foot elevated and wound covered. Call if there is not improvement by Friday. Return if symptoms worsen or fail to improve. Subjective   SUBJECTIVE/OBJECTIVE:  HPI  The pt is here for an ED follow up  She was seen on 21 and diagnosed with a blister of her left foot between the second and third toe. She was given mupirocin ointment and doxycycline. She has been on this medication for two days and has noticed no improvement in symptoms. She has the same amount of surrounding erythema and tenderness with pressure. The pt continues to have a small amount of fluid draining on her bandage. She is leaving the wound open to air during the day and keeping it covered at night and when she has to wear shoes. The pain is most intense with standing or wearing shoes. She is taking ibuprofen for the pain. The pt denies any changes to the surroudning erythema and there does not appear to be any streaking. Review of Systems   Constitutional: Negative for fever. Musculoskeletal: Positive for arthralgias and joint swelling. Skin: Positive for color change and wound. Hematological: Negative for adenopathy. Objective   Physical Exam  Vitals reviewed. Constitutional:       Appearance: Normal appearance. Musculoskeletal:      Left foot: Decreased range of motion. Swelling and tenderness present. Feet:      Left foot:      Skin integrity: Blister, erythema and warmth present. Neurological:      Mental Status: She is alert.           Media Information Document Information    Wound      09/01/2021 08:09   Attached To: Office Visit on 9/1/21 with CONNIE Daugherty   Source Information    CONNIE Daugherty  [facility] [place]            An electronic signature was used to authenticate this note.     --CONNIE Daugherty

## 2021-09-03 ENCOUNTER — PATIENT MESSAGE (OUTPATIENT)
Dept: FAMILY MEDICINE CLINIC | Age: 31
End: 2021-09-03

## 2021-09-03 RX ORDER — NAFTIFINE HYDROCHLORIDE 20 MG/G
CREAM TOPICAL
Qty: 60 G | Refills: 1 | Status: SHIPPED | OUTPATIENT
Start: 2021-09-03 | End: 2021-09-03 | Stop reason: SDUPTHER

## 2021-09-03 RX ORDER — NAFTIFINE HYDROCHLORIDE 20 MG/G
CREAM TOPICAL
Qty: 60 G | Refills: 1 | Status: SHIPPED | OUTPATIENT
Start: 2021-09-03 | End: 2022-02-18

## 2021-09-03 NOTE — TELEPHONE ENCOUNTER
From: Agnes Arora  To: CONNIE Rosenberg  Sent: 9/3/2021 8:54 AM EDT  Subject: Visit Follow-Up Question    Good morning! Attached is a picture of my toe (taken this morning) for comparison from the picture you took Wednesday. I had my 6th dose of clindamycin this morning, and I'm still using the ointment twice daily along with keeping it covered. It does still hurt, but ibuprofen & acetaminophen are helping. Please let me know if there is anything else I need to do, or if you need anything from me. Thank you!     Xavier Eddy

## 2021-09-10 ASSESSMENT — ENCOUNTER SYMPTOMS
CONSTIPATION: 0
DIARRHEA: 0
ABDOMINAL DISTENTION: 0
VOMITING: 0
SHORTNESS OF BREATH: 0
NAUSEA: 0
ABDOMINAL PAIN: 0

## 2021-09-10 NOTE — PROGRESS NOTES
acute distress. Appearance: Normal appearance. She is well-developed. She is not ill-appearing or toxic-appearing. HENT:      Head: Normocephalic and atraumatic. Neck:      Thyroid: No thyromegaly. Trachea: Trachea normal.   Cardiovascular:      Rate and Rhythm: Normal rate and regular rhythm. Heart sounds: Normal heart sounds, S1 normal and S2 normal.   Pulmonary:      Effort: Pulmonary effort is normal. No respiratory distress. Breath sounds: Normal breath sounds. Chest:      Breasts: Breasts are symmetrical.         Right: No inverted nipple, mass, nipple discharge, skin change or tenderness. Left: No inverted nipple, mass, nipple discharge, skin change or tenderness. Abdominal:      General: Bowel sounds are normal. There is no distension. Palpations: Abdomen is soft. Abdomen is not rigid. There is no mass. Tenderness: There is no abdominal tenderness. There is no guarding or rebound. Genitourinary:     General: Normal vulva. Exam position: Lithotomy position. Labia:         Right: No rash, tenderness, lesion or injury. Left: No rash, tenderness, lesion or injury. Vagina: No signs of injury. No vaginal discharge, erythema, tenderness or bleeding. Cervix: No cervical motion tenderness, discharge or friability. Uterus: Not tender. Adnexa:         Right: No mass, tenderness or fullness. Left: No mass, tenderness or fullness. Rectum: Guaiac result negative. Musculoskeletal:         General: Normal range of motion. Cervical back: Neck supple. Skin:     General: Skin is warm and dry. Findings: No erythema or rash. Nails: There is no clubbing. Neurological:      Mental Status: She is alert and oriented to person, place, and time. Psychiatric:         Speech: Speech normal.         Behavior: Behavior normal. Behavior is cooperative. Thought Content:  Thought content normal. Judgment: Judgment normal.         Assessment:       Diagnosis Orders   1. Women's annual routine gynecological examination  PAP SMEAR   2. Pap smear for cervical cancer screening  PAP SMEAR   3. IUD (intrauterine device) in place     4. Obesity (BMI 30-39.9)     5. History of cervical dysplasia     6.  Mild postpartum depression             Plan:      Orders Placed This Encounter   Procedures    PAP SMEAR    PAP SMEAR     Follow up prn and 1 year for well woman examination        Sarahi David DO

## 2021-09-17 ENCOUNTER — VIRTUAL VISIT (OUTPATIENT)
Dept: PSYCHOLOGY | Age: 31
End: 2021-09-17
Payer: COMMERCIAL

## 2021-09-17 DIAGNOSIS — F41.9 ANXIETY: ICD-10-CM

## 2021-09-17 DIAGNOSIS — F33.1 MDD (MAJOR DEPRESSIVE DISORDER), RECURRENT EPISODE, MODERATE (HCC): Primary | ICD-10-CM

## 2021-09-17 PROCEDURE — 90832 PSYTX W PT 30 MINUTES: CPT | Performed by: SOCIAL WORKER

## 2021-09-17 NOTE — PROGRESS NOTES
Behavioral Health Consultation  Angelica Molina. STACY Montgomery-S  9/17/2021  4:03 PM EDT      Time spent with Patient: 30 minutes  This is patient's 18th [facility] appointment. Reason for Consult:    Chief Complaint   Patient presents with    Anxiety    Depression     Referring Provider: Karly De Jesus, [address]  [place],  [address]    Feedback given to PCP. TELEHEALTH VISIT -- Audio/Visual (During QLARO-57 public health emergency)  }  Pursuant to the emergency declaration under the [facility], [facility] waiver authority and the [employer] Act, this Virtual Visit was conducted, with patient's consent, to reduce the patient's risk of exposure to COVID-19 and provide continuity of care for an established patient. Services were provided through a video synchronous discussion virtually to substitute for in-person clinic visit. Pt gave verbal informed consent to participate in telehealth services. Conducted a risk-benefit analysis and determined that the patient's presenting problems are consistent with the use of telepsychology. Determined that the patient has sufficient knowledge and skills in the use of technology enabling them to adequately benefit from telepsychology. It was determined that this patient was able to be properly treated without an in-person session. Patient verified that they were currently located at the [facility] address that was provided during registration.     Verified the following information:  Patient's identification: Yes  Patient location: [place] [address]   Patient's call back number: [phone]   Patient's emergency contact's name and number, as well as permission to contact them if needed: Extended Emergency Contact Information  Primary Emergency Contact: Elias Kauffman [address] [address] Phone: [phone]  Relation: Domestic Partner  Secondary Emergency Contact: Rizwan Emerson  Address: [place] [address]  Phone: [phone]  Mobile Phone: [phone]  Relation: Parent     Provider location: Naa Zacarias:  Pt endorses she is doing well overall. She has met with Dr. Marcianne Favre and this is going very well. She saw [facility] last week and mentioned the weight gain and she discontinue respirdone. Is working with  to get more movement in and doing a new davion called couch to [employer]. Overall optimistic. O:  MSE:    Appearance: good hygiene   Attitude: cooperative and friendly  Consciousness: alert  Orientation: oriented to person, place, time, general circumstance  Memory: recent and remote memory intact  Attention/Concentration: intact during session  Psychomotor Activity:normal  Eye Contact: normal  Speech: normal rate and volume, well-articulated  Mood: anxious and depressed but better  Affect: euthymic  Perception: within normal limits  Thought Content: within normal limits  Thought Process: logical, coherent and goal-directed  Insight: good  Judgment: intact  Ability to understand instructions: Yes  Ability to respond meaningfully: Yes  Morbid Ideation: no   Suicide Assessment: no suicidal ideation, plan, or intent  Homicidal Ideation: no    History:    Medications:   Current Outpatient Medications   Medication Sig Dispense Refill    Naftifine HCl 2 % CREA Apply a thin layer once daily to the affected area and 1/2\" outside the affected area for two weeks.  60 g 1    mupirocin (BACTROBAN) 2 % ointment       risperiDONE (RISPERDAL) 0.25 MG tablet Take 0.25 mg by mouth daily       REXULTI 1 MG TABS tablet 1 mg daily       DULoxetine (CYMBALTA) 60 MG extended release capsule Take 60 mg by mouth 2 times daily       Multiple Vitamin (MULTIVITAMIN ADULT PO) Take by mouth       Current Facility-Administered Medications   Medication Dose Route Frequency Provider Last Rate Last Admin    levonorgestrel (MIRENA) IUD 52 mg 1 each  1 each IntraUTERine Once Octavio Moreno DO         Social History:   Social History     Socioeconomic History    Marital status:      Spouse name: Not on file    Number of children: Not on file    Years of education: Not on file    Highest education level: Not on file   Occupational History    Occupation: RN   Tobacco Use    Smoking status: Never Smoker    Smokeless tobacco: Never Used   Vaping Use    Vaping Use: Never used   Substance and Sexual Activity    Alcohol use: Yes     Alcohol/week: 0.0 standard drinks     Comment: FEW TIMES PER MONTH    Drug use: No    Sexual activity: Yes     Partners: Male   Other Topics Concern    Not on file   Social History Narrative    Not on file     Social Determinants of Health     Financial Resource Strain: Low Risk     Difficulty of Paying Living Expenses: Not hard at all   Food Insecurity: No Food Insecurity    Worried About Running Out of Food in the Last Year: Never true    Glendy of Food in the Last Year: Never true   Transportation Needs:     Lack of Transportation (Medical):  Lack of Transportation (Non-Medical):    Physical Activity:     Days of Exercise per Week:     Minutes of Exercise per Session:    Stress:     Feeling of Stress :    Social Connections:     Frequency of Communication with Friends and Family:     Frequency of Social Gatherings with Friends and Family:     Attends [employer] Services:     Active Member of Clubs or Organizations:     Attends Club or Organization Meetings:     Marital Status:    Intimate Partner Violence:     Fear of Current or Ex-Partner:     Emotionally Abused:     Physically Abused:     Sexually Abused:      TOBACCO:   reports that she has never smoked. She has never used smokeless tobacco.  ETOH:   reports current alcohol use.   Family History:   Family History   Problem Relation Age of Onset    Diabetes Father     High Blood Pressure Father     High Cholesterol Father     Cancer Maternal Aunt         breast    Uterine Cancer Maternal Aunt     Breast Cancer Maternal Aunt     Cancer Maternal Aunt 79        breast    No Known Problems Paternal Grandfather     Heart Attack Paternal Grandmother     Stroke Maternal Grandmother     No Known Problems Maternal Grandfather     Depression Mother     High Blood Pressure Mother     Other Brother         severe seasonal allergies       A:  Pt in tx with Dr. Carissa Nieto. Optimistic about tx. Working with psychiatry. No SI/HI, insight and motivation good. Diagnosis:    1. MDD (major depressive disorder), recurrent episode, moderate ([facility] [address]    2.  Anxiety          Diagnosis Date    Abnormal Pap smear of cervix     Hydronephrosis 6/26/2013    Mild postpartum depression 6/5/2020    Obesity (BMI 30.0-34.9) 6/16/2014    Renal stone 6/26/2013    Varicella      Plan:  Pt interventions:  Trained in strategies for increasing balanced thinking, Discussed and set plan for behavioral activation, Discussed self-care (sleep, nutrition, rewarding activities, social support, exercise), Discussed benefits of referral for specialty care and Supportive techniques    Pt Behavioral Change Plan:   See Pt Instructions

## 2021-09-17 NOTE — PATIENT INSTRUCTIONS
1. Podcast: Mind Love episode  Mastermind with the four characters of your brain with Dr. Keila Reynolds  2. Continue the couch to 5K davion  3. Continue working with D.r Ossie Rinne  4. Return to see Shayla Piper in 5 weeks.

## 2021-10-22 ENCOUNTER — VIRTUAL VISIT (OUTPATIENT)
Dept: PSYCHOLOGY | Age: 31
End: 2021-10-22
Payer: COMMERCIAL

## 2021-10-22 DIAGNOSIS — F33.1 MDD (MAJOR DEPRESSIVE DISORDER), RECURRENT EPISODE, MODERATE (HCC): Primary | ICD-10-CM

## 2021-10-22 DIAGNOSIS — F41.9 ANXIETY: ICD-10-CM

## 2021-10-22 PROCEDURE — 90832 PSYTX W PT 30 MINUTES: CPT | Performed by: SOCIAL WORKER

## 2021-10-22 NOTE — PROGRESS NOTES
Behavioral Health Consultation  Moose Guerrero. [place]oth[place]IA colby  10/22/2021  5:01 PM EDT      Time spent with Patient: 30 minutes  This is patient's 19th [facility] appointment. Reason for Consult:    Chief Complaint   Patient presents with    Anxiety    Depression     Referring Provider: Gopal Schwartz DO  [address]  [address] [place] [address]  Jayy Wilks [address]    Feedback given to PCP. TELEHEALTH VISIT -- Audio/Visual (During AEYNG-11 public health emergency)  }  Pursuant to the emergency declaration under the [facility], [facility] waiver authority and the [employer] Act, this Virtual Visit was conducted, with patient's consent, to reduce the patient's risk of exposure to COVID-19 and provide continuity of care for an established patient. Services were provided through a video synchronous discussion virtually to substitute for in-person clinic visit. Pt gave verbal informed consent to participate in telehealth services. Conducted a risk-benefit analysis and determined that the patient's presenting problems are consistent with the use of telepsychology. Determined that the patient has sufficient knowledge and skills in the use of technology enabling them to adequately benefit from telepsychology. It was determined that this patient was able to be properly treated without an in-person session. Patient verified that they were currently located at the [facility] address that was provided during registration.     Verified the following information:  Patient's identification: Yes  Patient location: [place] [address]   Patient's call back number: [phone]   Patient's emergency contact's name and number, as well as permission to contact them if needed: Extended Emergency Contact Information  Primary Emergency Contact: Anisa Tang, [address] Phone: [phone]  Relation: Domestic Partner  Secondary Emergency Contact: Rizwan Emerson  Address: [address] [place] [address] Phone: [phone]  Mobile Phone: [phone]  Relation: Parent     Provider location: Jack Creed:  Pt doing well overall. Increased anxiety with stopping abilify, Seeing Monica List next week. Still seeing Dr. Rufino Hay and going very well. Mood improving. Kids doing well overall. Still issues with family and communication and works to avoid confrontation. Trigger with Daughter Franko Santiago) birthday coming up and feelings of guilt about adoption. Working through these with psychologist.     O:  MSE:    Appearance: good hygiene   Attitude: cooperative and friendly  Consciousness: alert  Orientation: oriented to person, place, time, general circumstance  Memory: recent and remote memory intact  Attention/Concentration: intact during session  Psychomotor Activity:normal  Eye Contact: normal  Speech: normal rate and volume, well-articulated  Mood: anxious   Affect: euthymic  Perception: within normal limits  Thought Content: within normal limits  Thought Process: logical, coherent and goal-directed  Insight: good  Judgment: intact  Ability to understand instructions: Yes  Ability to respond meaningfully: Yes  Morbid Ideation: no   Suicide Assessment: no suicidal ideation, plan, or intent  Homicidal Ideation: no    History:    Medications:   Current Outpatient Medications   Medication Sig Dispense Refill    Naftifine HCl 2 % CREA Apply a thin layer once daily to the affected area and 1/2\" outside the affected area for two weeks.  60 g 1    mupirocin (BACTROBAN) 2 % ointment       risperiDONE (RISPERDAL) 0.25 MG tablet Take 0.25 mg by mouth daily       REXULTI 1 MG TABS tablet 1 mg daily       DULoxetine (CYMBALTA) 60 MG extended release capsule Take 60 mg by mouth 2 times daily       Multiple Vitamin (MULTIVITAMIN ADULT PO) Take by mouth       Current Facility-Administered Medications Medication Dose Route Frequency Provider Last Rate Last Admin    levonorgestrel (MIRENA) IUD 52 mg 1 each  1 each IntraUTERine Once Benton Horvath DO         Social History:   Social History     Socioeconomic History    Marital status:      Spouse name: Not on file    Number of children: Not on file    Years of education: Not on file    Highest education level: Not on file   Occupational History    Occupation: RN   Tobacco Use    Smoking status: Never Smoker    Smokeless tobacco: Never Used   Vaping Use    Vaping Use: Never used   Substance and Sexual Activity    Alcohol use: Yes     Alcohol/week: 0.0 standard drinks     Comment: FEW TIMES PER MONTH    Drug use: No    Sexual activity: Yes     Partners: Male   Other Topics Concern    Not on file   Social History Narrative    Not on file     Social Determinants of Health     Financial Resource Strain: Low Risk     Difficulty of Paying Living Expenses: Not hard at all   Food Insecurity: No Food Insecurity    Worried About Running Out of Food in the Last Year: Never true    Glendy of Food in the Last Year: Never true   Transportation Needs:     Lack of Transportation (Medical):  Lack of Transportation (Non-Medical):    Physical Activity:     Days of Exercise per Week:     Minutes of Exercise per Session:    Stress:     Feeling of Stress :    Social Connections:     Frequency of Communication with Friends and Family:     Frequency of Social Gatherings with Friends and Family:     Attends [employer] Services:     Active Member of Clubs or Organizations:     Attends Club or Organization Meetings:     Marital Status:    Intimate Partner Violence:     Fear of Current or Ex-Partner:     Emotionally Abused:     Physically Abused:     Sexually Abused:      TOBACCO:   reports that she has never smoked. She has never used smokeless tobacco.  ETOH:   reports current alcohol use.   Family History:   Family History   Problem Relation Age of Onset    Diabetes Father     High Blood Pressure Father     High Cholesterol Father     Cancer Maternal Aunt         breast    Uterine Cancer Maternal Aunt     Breast Cancer Maternal Aunt     Cancer Maternal Aunt 79        breast    No Known Problems Paternal Grandfather     Heart Attack Paternal Grandmother     Stroke Maternal Grandmother     No Known Problems Maternal Grandfather     Depression Mother     High Blood Pressure Mother     Other Brother         severe seasonal allergies       A:  Pt struggling with increased anxiety with some medication changes. No SI/HI, insight and motivation good. Diagnosis:    1. MDD (major depressive disorder), recurrent episode, moderate ([facility] [address]    2.  Anxiety          Diagnosis Date    Abnormal Pap smear of cervix     Hydronephrosis 6/26/2013    Mild postpartum depression 6/5/2020    Obesity (BMI 30.0-34.9) 6/16/2014    Renal stone 6/26/2013    Varicella      Plan:  Pt interventions:  Trained in strategies for increasing balanced thinking, Discussed and set plan for behavioral activation, Discussed self-care (sleep, nutrition, rewarding activities, social support, exercise), Discussed benefits of referral for specialty care, Supportive techniques and Emphasized self-care as important for managing overall health    Pt Behavioral Change Plan:   See Pt Instructions

## 2021-12-10 ENCOUNTER — VIRTUAL VISIT (OUTPATIENT)
Dept: PSYCHOLOGY | Age: 31
End: 2021-12-10
Payer: COMMERCIAL

## 2021-12-10 DIAGNOSIS — F41.9 ANXIETY: ICD-10-CM

## 2021-12-10 DIAGNOSIS — F33.1 MDD (MAJOR DEPRESSIVE DISORDER), RECURRENT EPISODE, MODERATE (HCC): Primary | ICD-10-CM

## 2021-12-10 PROCEDURE — 90832 PSYTX W PT 30 MINUTES: CPT | Performed by: SOCIAL WORKER

## 2021-12-10 NOTE — PATIENT INSTRUCTIONS
1.  The Science of Keiry [address].  2. The cautionary tales of daisy and the pain of rejection  3. Set realistic goals  4. Return to see Samuel in 5 weeks.
Alcohol abuse

## 2021-12-10 NOTE — PROGRESS NOTES
Behavioral Health Consultation  Melvin Davis. OLIVIA Montgomery  12/10/2021  4:14 PM EST      Time spent with Patient: 30 minutes  This is patient's 20th [facility] appointment. Reason for Consult:    Chief Complaint   Patient presents with    Anxiety    Depression     Referring Provider: Shea Cervantes DO  [address]  [address] [address]  Jayy Wilks    Feedback given to PCP. TELEHEALTH VISIT -- Audio/Visual (During PWKBD-80 public health emergency)  }  Pursuant to the emergency declaration under the [facility], [facility] waiver authority and the "[employer] and [employer] Act, this Virtual Visit was conducted, with patient's consent, to reduce the patient's risk of exposure to COVID-19 and provide continuity of care for an established patient. Services were provided through a video synchronous discussion virtually to substitute for in-person clinic visit. Pt gave verbal informed consent to participate in telehealth services. Conducted a risk-benefit analysis and determined that the patient's presenting problems are consistent with the use of telepsychology. Determined that the patient has sufficient knowledge and skills in the use of technology enabling them to adequately benefit from telepsychology. It was determined that this patient was able to be properly treated without an in-person session. Patient verified that they were currently located at the [facility] address that was provided during registration.     Verified the following information:  Patient's identification: Yes  Patient location: [facility] [address]   Patient's call back number: [phone]   Patient's emergency contact's name and number, as well as permission to contact them if needed: Extended Emergency Contact Information  Primary Emergency Contact: Gray Whitley, [address] Phone: [phone]  Relation: Domestic Partner  Secondary Emergency Contact: Rizwan Emerson  Address: [address] Phone: [phone]  Mobile Phone: [phone]  Relation: Parent     Provider location: Margaret Agee:  Pt is doing okay, but feeling like she is struggling with increased depression. Monica Dawn recently added the wellbutrin. If not having the best reaction will look at genesight testing. This time we are is always very difficult, as it is her daughter [de-identified] birthday as well as the holidays. 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 O:  MSE:    Appearance: good hygiene   Attitude: cooperative and friendly  Consciousness: alert  Orientation: oriented to person, place, time, general circumstance  Memory: recent and remote memory intact  Attention/Concentration: intact during session  Psychomotor Activity:normal  Eye Contact: normal  Speech: normal rate and volume, well-articulated  Mood: Depressed and anxious  Affect: dysphoric  Perception: within normal limits  Thought Content: within normal limits  Thought Process: logical, coherent and goal-directed  Insight: good  Judgment: intact  Ability to understand instructions: Yes  Ability to respond meaningfully: Yes  Morbid Ideation: no   Suicide Assessment: no suicidal ideation, plan, or intent  Homicidal Ideation: no    History:    Medications:   Current Outpatient Medications   Medication Sig Dispense Refill    Naftifine HCl 2 % CREA Apply a thin layer once daily to the affected area and 1/2\" outside the affected area for two weeks.  60 g 1    mupirocin (BACTROBAN) 2 % ointment       risperiDONE (RISPERDAL) 0.25 MG tablet Take 0.25 mg by mouth daily       REXULTI 1 MG TABS tablet 1 mg daily       DULoxetine (CYMBALTA) 60 MG extended release capsule Take 60 mg by mouth 2 times daily       Multiple Vitamin (MULTIVITAMIN ADULT PO) Take by mouth       Current Facility-Administered Medications   Medication Dose Route Frequency Provider Last Rate Last Admin    levonorgestrel (MIRENA) IUD 52 mg 1 each  1 each IntraUTERine Once Shiela Carbajal DO         Social History:   Social History     Socioeconomic History    Marital status:      Spouse name: Not on file    Number of children: Not on file    Years of education: Not on file    Highest education level: Not on file   Occupational History    Occupation: RN   Tobacco Use    Smoking status: Never Smoker    Smokeless tobacco: Never Used   Vaping Use    Vaping Use: Never used   Substance and Sexual Activity    Alcohol use: Yes     Alcohol/week: 0.0 standard drinks     Comment: FEW TIMES PER MONTH    Drug use: No    Sexual activity: Yes     Partners: Male   Other Topics Concern    Not on file   Social History Narrative    Not on file     Social Determinants of Health     Financial Resource Strain: Low Risk     Difficulty of Paying Living Expenses: Not hard at all   Food Insecurity: No Food Insecurity    Worried About Running Out of Food in the Last Year: Never true    [place] of Food in the Last Year: Never true   Transportation Needs:     Lack of Transportation (Medical): Not on file    Lack of Transportation (Non-Medical):  Not on file   Physical Activity:     Days of Exercise per Week: Not on file    Minutes of Exercise per Session: Not on file   Stress:     Feeling of Stress : Not on file   Social Connections:     Frequency of Communication with Friends and Family: Not on file    Frequency of Social Gatherings with Friends and Family: Not on file    Attends Holiness Services: Not on file    Active Member of Clubs or Organizations: Not on file    Attends Club or Organization Meetings: Not on file    Marital Status: Not on file   Intimate Partner Violence:     Fear of Current or Ex-Partner: Not on file    Emotionally Abused: Not on file    Physically Abused: Not on file    Sexually Abused: Not on file   Housing Stability:     Unable to Pay for Housing in the Last Year: Not on file    Number of Places Lived in the Last Year: Not on file    Unstable Housing in the Last Year: Not on file     TOBACCO:   reports that she has never smoked. She has never used smokeless tobacco.  ETOH:   reports current alcohol use. Family History:   Family History   Problem Relation Age of Onset    Diabetes Father     High Blood Pressure Father     High Cholesterol Father     Cancer Maternal Aunt         breast    Uterine Cancer Maternal Aunt     Breast Cancer Maternal Aunt     Cancer Maternal Aunt 79        breast    No Known Problems Paternal Grandfather     Heart Attack Paternal Grandmother     Stroke Maternal Grandmother     No Known Problems Maternal Grandfather     Depression Mother     High Blood Pressure Mother     Other Brother         severe seasonal allergies       A:  Patient endorses continued struggles with depression. This time year can be especially difficult with the holidays, and the birthday of her first daughter. 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 Galilea Zhao, and active with [facility]. No SI/HI, insight and motivation good. Diagnosis:    1. MDD (major depressive disorder), recurrent episode, moderate ([address]    2.  Anxiety          Diagnosis Date    Abnormal Pap smear of cervix     Hydronephrosis 6/26/2013    Mild postpartum depression 6/5/2020    Obesity (BMI 30.0-34.9) 6/16/2014    Renal stone 6/26/2013    Varicella      Plan:  Pt interventions:  Trained in strategies for increasing balanced thinking, Discussed and set plan for behavioral activation, Discussed self-care (sleep, nutrition, rewarding activities, social support, exercise), Discussed benefits of referral for specialty care, Supportive techniques and Emphasized self-care as important for managing overall health    Pt Behavioral Change Plan:   See Pt Instructions

## 2022-01-21 ENCOUNTER — VIRTUAL VISIT (OUTPATIENT)
Dept: PSYCHOLOGY | Age: 32
End: 2022-01-21
Payer: COMMERCIAL

## 2022-01-21 DIAGNOSIS — F41.9 ANXIETY: ICD-10-CM

## 2022-01-21 DIAGNOSIS — F33.41 RECURRENT MAJOR DEPRESSIVE DISORDER, IN PARTIAL REMISSION (HCC): Primary | ICD-10-CM

## 2022-01-21 PROCEDURE — 90832 PSYTX W PT 30 MINUTES: CPT | Performed by: SOCIAL WORKER

## 2022-01-21 NOTE — PATIENT INSTRUCTIONS
1.  Enjoy the happiness lab  2. [place]aste and Whole [facility] Child  3. Return to see Carlos Gonzalez in 8 weeks.

## 2022-01-21 NOTE — PROGRESS NOTES
Behavioral Health Consultation  Dorys Francisco. [place]  1/21/2022  4:07 PM EST      Time spent with Patient: 30 minutes  This is patient's 21th [facility] appointment. Reason for Consult:    Chief Complaint   Patient presents with    Anxiety    Depression     Referring Provider: Naomy Sanford DO  [address] [employer] [employer]  [address]    Feedback given to PCP. TELEHEALTH VISIT -- Audio/Visual (During NJJUV-35 public health emergency)  }  Pursuant to the emergency declaration under the [address] [place] [address], [facility] waiver authority and the [employer] and [employer] Act, this Virtual Visit was conducted, with patient's consent, to reduce the patient's risk of exposure to COVID-19 and provide continuity of care for an established patient. Services were provided through a video synchronous discussion virtually to substitute for in-person clinic visit. Pt gave verbal informed consent to participate in telehealth services. Conducted a risk-benefit analysis and determined that the patient's presenting problems are consistent with the use of telepsychology. Determined that the patient has sufficient knowledge and skills in the use of technology enabling them to adequately benefit from telepsychology. It was determined that this patient was able to be properly treated without an in-person session. Patient verified that they were currently located at the [facility] address that was provided during registration.     Verified the following information:  Patient's identification: Yes  Patient location: [place] [address]   Patient's call back number: [phone]   Patient's emergency contact's name and number, as well as permission to contact them if needed: Extended Emergency Contact Information  Primary Emergency Contact: Mattie Brooks [address] [address] Phone: [phone]  Relation: Domestic Partner  Secondary Emergency Contact: Rizwan Emerson  Address: [address] [place] [address] Phone: [phone]  Mobile Phone: [phone]  Relation: Parent     Provider location: [address]:  Pt is doing better overall. Pt's entire family caught covid over the holiday, so this has been difficult. Her daughter who is 1.5 yrs old caught it and this was very scary. Pt and  have decided to start trying for another baby so she is working very hard to work on improving her mood. She has been feeling happier lately, and less anxious overall. Pt went off all of medication in Dec, she initially forgot to take them with routine change however ultimately ended up going off all of her medicine altogether. She is now taking just BuSpar for her anxiety, and feeling much better being on just this and no citalopram and Wellbutrin she also noticed that she had significant side effects to Wellbutrin and was itching all over her body, which resolved when she went. Patient is working on her health and continuing to do great work with Dr. Keiko Regan.     O:  MSE:    Appearance: good hygiene   Attitude: cooperative and friendly  Consciousness: alert  Orientation: oriented to person, place, time, general circumstance  Memory: recent and remote memory intact  Attention/Concentration: intact during session  Psychomotor Activity:normal  Eye Contact: normal  Speech: normal rate and volume, well-articulated  Mood: Depressed and anxious but better  Affect: euthymic  Perception: within normal limits  Thought Content: within normal limits  Thought Process: logical, coherent and goal-directed  Insight: good  Judgment: intact  Ability to understand instructions: Yes  Ability to respond meaningfully: Yes  Morbid Ideation: no   Suicide Assessment: no suicidal ideation, plan, or intent  Homicidal Ideation: no    History:    Medications:   Current Outpatient Medications   Medication Sig Dispense Refill    Naftifine HCl 2 % CREA Apply a thin layer once daily to the affected area and 1/2\" outside the affected area for two weeks. 60 g 1    mupirocin (BACTROBAN) 2 % ointment       risperiDONE (RISPERDAL) 0.25 MG tablet Take 0.25 mg by mouth daily       REXULTI 1 MG TABS tablet 1 mg daily       DULoxetine (CYMBALTA) 60 MG extended release capsule Take 60 mg by mouth 2 times daily       Multiple Vitamin (MULTIVITAMIN ADULT PO) Take by mouth       Current Facility-Administered Medications   Medication Dose Route Frequency Provider Last Rate Last Admin    levonorgestrel (MIRENA) IUD 52 mg 1 each  1 each IntraUTERine Once Milly Blaze, DO         Social History:   Social History     Socioeconomic History    Marital status:      Spouse name: Not on file    Number of children: Not on file    Years of education: Not on file    Highest education level: Not on file   Occupational History    Occupation: RN   Tobacco Use    Smoking status: Never Smoker    Smokeless tobacco: Never Used   Vaping Use    Vaping Use: Never used   Substance and Sexual Activity    Alcohol use: Yes     Alcohol/week: 0.0 standard drinks     Comment: FEW TIMES PER MONTH    Drug use: No    Sexual activity: Yes     Partners: Male   Other Topics Concern    Not on file   Social History Narrative    Not on file     Social Determinants of Health     Financial Resource Strain: Low Risk     Difficulty of Paying Living Expenses: Not hard at all   Food Insecurity: No Food Insecurity    Worried About Running Out of Food in the Last Year: Never true    Glendy of Food in the Last Year: Never true   Transportation Needs:     Lack of Transportation (Medical): Not on file    Lack of Transportation (Non-Medical):  Not on file   Physical Activity:     Days of Exercise per Week: Not on file    Minutes of Exercise per Session: Not on file   Stress:     Feeling of Stress : Not on file   Social Connections:     Frequency of Communication with Friends and Family: Not on file    Frequency of Social Gatherings with Friends and Family: Not on file    Attends Confucianism Services: Not on file    Active Member of Clubs or Organizations: Not on file    Attends Club or Organization Meetings: Not on file    Marital Status: Not on file   Intimate Partner Violence:     Fear of Current or Ex-Partner: Not on file    Emotionally Abused: Not on file    Physically Abused: Not on file    Sexually Abused: Not on file   Housing Stability:     Unable to Pay for Housing in the Last Year: Not on file    Number of [place] in the Last Year: Not on file    Unstable Housing in the Last Year: Not on file     TOBACCO:   reports that she has never smoked. She has never used smokeless tobacco.  ETOH:   reports current alcohol use. Family History:   Family History   Problem Relation Age of Onset    Diabetes Father     High Blood Pressure Father     High Cholesterol Father     Cancer Maternal Aunt         breast    Uterine Cancer Maternal Aunt     Breast Cancer Maternal Aunt     Cancer Maternal Aunt 79        breast    No Known Problems Paternal Grandfather     Heart Attack Paternal Grandmother     Stroke Maternal Grandmother     No Known Problems Maternal Grandfather     Depression Mother     High Blood Pressure Mother     Other Brother         severe seasonal allergies       A:  Patient endorses mood is beginning to lift, and depression is in the partial remission. She is no longer taking Wellbutrin and Cymbalta, just taking both BuSpar and this is proving successful. Following up with psychiatry next week. Patient continuing to work in specialty mental health with Dr. Leyla Albert this is going very well. No SI/HI, insight and motivation good. Diagnosis:    1. Recurrent major depressive disorder, in partial remission ([facility]    2.  Anxiety          Diagnosis Date    Abnormal Pap smear of cervix     Hydronephrosis 6/26/2013    Mild postpartum

## 2022-02-16 ENCOUNTER — OFFICE VISIT (OUTPATIENT)
Dept: OBGYN CLINIC | Age: 32
End: 2022-02-16
Payer: COMMERCIAL

## 2022-02-16 VITALS
BODY MASS INDEX: 39.99 KG/M2 | TEMPERATURE: 96.9 F | HEART RATE: 77 BPM | WEIGHT: 233 LBS | SYSTOLIC BLOOD PRESSURE: 96 MMHG | DIASTOLIC BLOOD PRESSURE: 62 MMHG

## 2022-02-16 DIAGNOSIS — Z30.432 ENCOUNTER FOR IUD REMOVAL: Primary | ICD-10-CM

## 2022-02-16 DIAGNOSIS — Z86.59 HISTORY OF POSTPARTUM DEPRESSION: ICD-10-CM

## 2022-02-16 DIAGNOSIS — Z87.410 HISTORY OF CERVICAL DYSPLASIA: ICD-10-CM

## 2022-02-16 DIAGNOSIS — E66.9 OBESITY (BMI 30-39.9): ICD-10-CM

## 2022-02-16 DIAGNOSIS — Z87.59 HISTORY OF POSTPARTUM DEPRESSION: ICD-10-CM

## 2022-02-16 PROCEDURE — 58301 REMOVE INTRAUTERINE DEVICE: CPT | Performed by: OBSTETRICS & GYNECOLOGY

## 2022-02-16 RX ORDER — BUSPIRONE HYDROCHLORIDE 10 MG/1
TABLET ORAL
COMMUNITY
End: 2022-10-28

## 2022-02-16 RX ORDER — SWAB
SWAB, NON-MEDICATED MISCELLANEOUS
COMMUNITY

## 2022-02-18 PROBLEM — Z87.59 HISTORY OF POSTPARTUM DEPRESSION: Status: ACTIVE | Noted: 2020-06-05

## 2022-02-18 PROBLEM — Z86.59 HISTORY OF POSTPARTUM DEPRESSION: Status: ACTIVE | Noted: 2020-06-05

## 2022-02-18 ASSESSMENT — ENCOUNTER SYMPTOMS: SHORTNESS OF BREATH: 0

## 2022-02-18 NOTE — PROGRESS NOTES
Subjective:      Patient ID: Kathie Sommers is a 27 y.o. female. HPI   28 y/o  (son is 15years old; daughter adopted at birth--patient receives regular updates) female presents for IUD removal.  Patient is interested in future childbearing. Patient would like to try but if pregnancy does not desire intervention if it does not happen. Interested in [address] if unsuccessful. Last pap smear was 21--normal.  Tested negative for high risk HPV in 2019. Menses are every month x 4-5 days, light, no dysmenorrhea. Doing well with Mirena IUD (2nd IUD placed 7/15/2020). Patient is 1-2 years s/p . Pregnancy was complicated by GBBS positive, obesity, anxiety, and history of LEEP procedure. 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 Had normal pap smear in , , 2015, 2016, 2017, 2018, and 2019. Sexually active, no problems, monogamous x 4-5 years,  on 10/10/2020. Review of Systems   Constitutional: Negative. Negative for activity change, appetite change, chills, fatigue, fever and unexpected weight change. Respiratory: Negative for shortness of breath. Cardiovascular: Negative for chest pain. Genitourinary: Negative for difficulty urinating, dysuria, hematuria, menstrual problem, pelvic pain, vaginal bleeding, vaginal discharge and vaginal pain. Psychiatric/Behavioral: Negative. Objective:   Physical Exam  Vitals and nursing note reviewed. Constitutional:       General: She is not in acute distress. Appearance: Normal appearance. She is well-developed. She is not ill-appearing, toxic-appearing or diaphoretic. Pulmonary:      Effort: Pulmonary effort is normal.   Genitourinary:     General: Normal vulva. Exam position: Lithotomy position. Pubic Area: No rash. Labia:         Right: No rash, tenderness, lesion or injury.          Left: No rash, tenderness, lesion or injury. Vagina: Foreign body present. No signs of injury. No vaginal discharge, erythema, tenderness, bleeding or lesions. Cervix: No cervical motion tenderness, discharge, friability, lesion, erythema or cervical bleeding. Uterus: Not tender. Comments: IUD Removal (Procedure):  Patient taken to exam room, risks and benefits discussed for removal of IUD, written and informed consent obtained. Patient positioned in dorsal lithotomy position. Speculum placed. IUD string identified. IUD removed with ring forceps with minimal difficulty. Patient tolerated procedure well. All instruments removed. Skin:     General: Skin is warm and dry. Neurological:      Mental Status: She is alert and oriented to person, place, and time. Psychiatric:         Behavior: Behavior normal.         Thought Content: Thought content normal.         Judgment: Judgment normal.         Assessment:       Diagnosis Orders   1. Encounter for IUD removal     2. Obesity (BMI 30-39.9)     3. History of cervical dysplasia     4.  History of postpartum depression             Plan:      Orders Placed This Encounter   Procedures    22895 - [place] REMOVE INTRAUTERINE DEVICE     Menstrual diary  Start prenatal vitamins with folic acid as soon as possible  Follow up prn and 8 months for well woman examination        [address],

## 2022-03-11 ENCOUNTER — TELEMEDICINE (OUTPATIENT)
Dept: PSYCHOLOGY | Age: 32
End: 2022-03-11
Payer: COMMERCIAL

## 2022-03-11 DIAGNOSIS — F41.9 ANXIETY: ICD-10-CM

## 2022-03-11 DIAGNOSIS — F33.41 RECURRENT MAJOR DEPRESSIVE DISORDER, IN PARTIAL REMISSION (HCC): Primary | ICD-10-CM

## 2022-03-11 PROCEDURE — 99443 PR PHYS/QHP TELEPHONE EVALUATION 21-30 MIN: CPT | Performed by: SOCIAL WORKER

## 2022-03-11 NOTE — PATIENT INSTRUCTIONS
1.  Continue with Christopher Stoddard  2. Continue working through the happiness lab  3. Consider getting The anxiety workbook Moustapha Barry and Mateusz  4. [address] a woman  5. Return to see Mattie Casiano in 8 weeks.

## 2022-03-11 NOTE — PROGRESS NOTES
if needed: Extended Emergency Contact Information  Primary Emergency Contact: Maynor Muñoz, [address] Phone: [phone]  Relation: Domestic Partner  Secondary Emergency Contact: JoanermajamesRizwan KEVIN  Address: [address] Phone: [phone]  Mobile Phone: [phone]  Relation: Parent    Provider location: [place], [address] [place] [address] affirm this is an episode with a Patient who has not had a related appointment within my department in the past 7 days or scheduled within the next 24 hours. S:  Pt has been taking off all of her medications except buspar for anxiety, as she is hoping to get pregnant. They removed her IUD. Feels that she is doing well off of medications overall, may even be feeling more. Michael [place] is starting and she gets anxious with all the travel. Patient does feel like depression has been better. 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     O:  MSE:    Attitude: cooperative and friendly  Consciousness: alert  Orientation: oriented to person, place, time, general circumstance  Memory: recent and remote memory intact  Attention/Concentration: intact during session  Speech: normal rate and volume, well-articulated  Mood: anxious but better  Affect: euthymic  Perception: within normal limits  Thought Content: within normal limits  Thought Process: logical, coherent and goal-directed  Insight: good  Judgment: intact  Ability to understand instructions: Yes  Ability to respond meaningfully: Yes  Morbid Ideation: no   Suicide Assessment: no suicidal ideation, plan, or intent  Homicidal Ideation: no    History:    Medications:   Current Outpatient Medications   Medication Sig Dispense Refill    busPIRone (BUSPAR) 10 MG tablet       Vitamin D, Cholecalciferol, 10 MCG (400 UNIT) CAPS        Current Facility-Administered Medications   Medication Dose Route Frequency Provider Last Rate Last Admin    levonorgestrel (MIRENA) IUD 52 mg 1 each  1 each IntraUTERine Once Lavera Like, DO         Social History:   Social History     Socioeconomic History    Marital status:      Spouse name: Not on file    Number of children: Not on file    Years of education: Not on file    Highest education level: Not on file   Occupational History    Occupation: RN   Tobacco Use    Smoking status: Never Smoker    Smokeless tobacco: Never Used   Vaping Use    Vaping Use: Never used   Substance and Sexual Activity    Alcohol use: Yes     Alcohol/week: 0.0 standard drinks     Comment: FEW TIMES PER MONTH    Drug use: No    Sexual activity: Yes     Partners: Male   Other Topics Concern    Not on file   Social History Narrative    Not on file     Social Determinants of Health     Financial Resource Strain: Low Risk     Difficulty of Paying Living Expenses: Not hard at all   Food Insecurity: No Food Insecurity    Worried About Running Out of Food in the Last Year: Never true    Glendy of Food in the Last Year: Never true   Transportation Needs:     Lack of Transportation (Medical): Not on file    Lack of Transportation (Non-Medical):  Not on file   Physical Activity:     Days of Exercise per Week: Not on file    Minutes of Exercise per Session: Not on file   Stress:     Feeling of Stress : Not on file   Social Connections:     Frequency of Communication with Friends and Family: Not on file    Frequency of Social Gatherings with Friends and Family: Not on file    Attends Spiritism Services: Not on file    Active Member of Clubs or Organizations: Not on file    Attends Club or Organization Meetings: Not on file    Marital Status: Not on file   Intimate Partner Violence:     Fear of Current or Ex-Partner: Not on file    Emotionally Abused: Not on file    Physically Abused: Not on file    Sexually Abused: Not on file   Housing Stability:     Unable to Pay for Housing in the Last Year: Not on file    Number of Places Lived in the Last Year: Not on file    Unstable Housing in the Last Year: Not on file     TOBACCO:   reports that she has never smoked. She has never used smokeless tobacco.  ETOH:   reports current alcohol use. Family History:   Family History   Problem Relation Age of Onset    Diabetes Father     High Blood Pressure Father     High Cholesterol Father     Cancer Maternal Aunt         breast    Uterine Cancer Maternal Aunt     Breast Cancer Maternal Aunt     Cancer Maternal Aunt 79        breast    No Known Problems Paternal Grandfather     Heart Attack Paternal Grandmother     Stroke Maternal Grandmother     No Known Problems Maternal Grandfather     Depression Mother     High Blood Pressure Mother     Other Brother         severe seasonal allergies       A:  Patient endorses improvement with overall depression. Still some issues with anxiety but overall this is improved to. Currently off all of her medications with exception of BuSpar and this is proven successful. 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 No SI/HI, insight and motivation good. Diagnosis:    1. Recurrent major depressive disorder, in partial remission ([facility] [address]    2.  Anxiety          Diagnosis Date    Abnormal Pap smear of cervix     Hydronephrosis 6/26/2013    Mild postpartum depression 6/5/2020    Obesity (BMI 30.0-34.9) 6/16/2014    Renal stone 6/26/2013    Varicella      Plan:  Pt interventions:  Trained in strategies for increasing balanced thinking, Discussed and set plan for behavioral activation, Provided education, Discussed self-care (sleep, nutrition, rewarding activities, social support, exercise), Discussed benefits of referral for specialty care, Supportive techniques and Emphasized self-care as important for managing overall health    Pt Behavioral Change Plan:   See Pt Instructions       was evaluated through a synchronous (real-time) audio-video encounter. The patient (or guardian if applicable) is aware that this is a billable service, which includes applicable co-pays. This Virtual Visit was conducted with patient's (and/or legal guardian's) consent. The visit was conducted pursuant to the emergency declaration under the [address] [place] [address] authority and the [employer] General Act. Patient identification was verified, and a caregiver was present when appropriate. The patient was located in a state where the provider was licensed to provide care.

## 2022-04-11 LAB
C. TRACHOMATIS, EXTERNAL RESULT: NEGATIVE
N. GONORRHOEAE, EXTERNAL RESULT: NEGATIVE

## 2022-04-28 LAB
ABO, EXTERNAL RESULT: NORMAL
HEP B, EXTERNAL RESULT: NEGATIVE
HIV, EXTERNAL RESULT: NORMAL
RH FACTOR, EXTERNAL RESULT: POSITIVE
RPR, EXTERNAL RESULT: NORMAL
RUBELLA TITER, EXTERNAL RESULT: NORMAL

## 2022-05-11 ENCOUNTER — OFFICE VISIT (OUTPATIENT)
Dept: OBGYN CLINIC | Age: 32
End: 2022-05-11
Payer: COMMERCIAL

## 2022-05-11 VITALS
WEIGHT: 231.6 LBS | SYSTOLIC BLOOD PRESSURE: 118 MMHG | BODY MASS INDEX: 39.75 KG/M2 | HEART RATE: 85 BPM | TEMPERATURE: 97.6 F | DIASTOLIC BLOOD PRESSURE: 74 MMHG

## 2022-05-11 DIAGNOSIS — O34.40 HISTORY OF CERVICAL LEEP BIOPSY AFFECTING CARE OF MOTHER, ANTEPARTUM: ICD-10-CM

## 2022-05-11 DIAGNOSIS — N91.2 AMENORRHEA: Primary | ICD-10-CM

## 2022-05-11 DIAGNOSIS — E66.9 OBESITY (BMI 30-39.9): ICD-10-CM

## 2022-05-11 DIAGNOSIS — Z87.59 HISTORY OF POSTPARTUM DEPRESSION: ICD-10-CM

## 2022-05-11 DIAGNOSIS — Z11.3 SCREENING EXAMINATION FOR STD (SEXUALLY TRANSMITTED DISEASE): ICD-10-CM

## 2022-05-11 DIAGNOSIS — Z86.59 HISTORY OF POSTPARTUM DEPRESSION: ICD-10-CM

## 2022-05-11 DIAGNOSIS — Z98.890 HISTORY OF CERVICAL LEEP BIOPSY AFFECTING CARE OF MOTHER, ANTEPARTUM: ICD-10-CM

## 2022-05-11 LAB
CRL: NORMAL
SAC DIAMETER: NORMAL

## 2022-05-11 PROCEDURE — 81025 URINE PREGNANCY TEST: CPT | Performed by: OBSTETRICS & GYNECOLOGY

## 2022-05-11 PROCEDURE — 76801 OB US < 14 WKS SINGLE FETUS: CPT | Performed by: OBSTETRICS & GYNECOLOGY

## 2022-05-11 PROCEDURE — 99213 OFFICE O/P EST LOW 20 MIN: CPT | Performed by: OBSTETRICS & GYNECOLOGY

## 2022-05-11 RX ORDER — ONDANSETRON 4 MG/1
4 TABLET, ORALLY DISINTEGRATING ORAL EVERY 8 HOURS PRN
Qty: 20 TABLET | Refills: 1 | Status: SHIPPED | OUTPATIENT
Start: 2022-05-11 | End: 2022-06-08 | Stop reason: SDUPTHER

## 2022-05-12 LAB
BACTERIA: ABNORMAL /HPF
BILIRUBIN URINE: NEGATIVE
BLOOD, URINE: NEGATIVE
CALCIUM OXALATE CRYSTALS: PRESENT
CANDIDA SPECIES, DNA PROBE: NORMAL
CLARITY: CLEAR
COLOR: ABNORMAL
CRYSTALS, UA: ABNORMAL /HPF
EPITHELIAL CELLS, UA: 4 /HPF (ref 0–5)
GARDNERELLA VAGINALIS, DNA PROBE: NORMAL
GLUCOSE URINE: NEGATIVE MG/DL
HYALINE CASTS: 0 /LPF (ref 0–8)
KETONES, URINE: ABNORMAL MG/DL
LEUKOCYTE ESTERASE, URINE: ABNORMAL
MICROSCOPIC EXAMINATION: YES
NITRITE, URINE: NEGATIVE
PH UA: 6 (ref 5–8)
PROTEIN UA: ABNORMAL MG/DL
RBC UA: 3 /HPF (ref 0–4)
SPECIFIC GRAVITY UA: 1.02 (ref 1–1.03)
TRICHOMONAS VAGINALIS DNA: NORMAL
URINE CULTURE, ROUTINE: NORMAL
URINE TYPE: ABNORMAL
UROBILINOGEN, URINE: 1 E.U./DL
WBC UA: 1 /HPF (ref 0–5)

## 2022-05-13 LAB
C TRACH DNA GENITAL QL NAA+PROBE: NEGATIVE
N. GONORRHOEAE DNA: NEGATIVE

## 2022-05-18 NOTE — PROGRESS NOTES
Subjective:      Patient ID: Rosalee Butts is a 34 y.o. female. HPI   35 y/o  female presents for evaluation secondary to missed menses/amenorrhea. Last pap smear was 21--normal.  Tested negative for high risk HPV in 2019. Menses are usually every month x 4-5 days, light, no dysmenorrhea. FDLMP: 3/20/22. Denies vaginal bleeding and vaginal discharge since March. Has noted nausea. Patient seen at [facility] on Monday for UTI--treated with keflex. Patient is 2 years s/p . Pregnancy was complicated by GBBS positive, obesity, anxiety, and history of LEEP procedure. 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 Had normal pap smear in , , , 2016, , , and . Sexually active, no problems, monogamous x 5 years,  on 10/10/2020. Review of Systems   Constitutional: Negative. Negative for activity change, appetite change, chills, fatigue, fever and unexpected weight change. Respiratory: Negative for shortness of breath. Cardiovascular: Negative for chest pain. Gastrointestinal: Positive for nausea and vomiting. Negative for abdominal distention, abdominal pain, constipation and diarrhea. Endocrine: Negative for cold intolerance and heat intolerance. Genitourinary: Positive for menstrual problem. Negative for difficulty urinating, dyspareunia, dysuria, frequency, genital sores, hematuria, pelvic pain, vaginal bleeding, vaginal discharge and vaginal pain. Skin: Negative for rash. Neurological: Negative for headaches. Hematological: Does not bruise/bleed easily. Psychiatric/Behavioral: Negative. Objective:   Physical Exam  Vitals and nursing note reviewed. Exam conducted with a chaperone present. Constitutional:       General: She is not in acute distress. Appearance: Normal appearance. She is well-developed. She is not ill-appearing or toxic-appearing. HENT:      Head: Normocephalic and atraumatic. Neck:      Thyroid: No thyromegaly. Trachea: Trachea normal.   Cardiovascular:      Rate and Rhythm: Normal rate and regular rhythm. Heart sounds: Normal heart sounds, S1 normal and S2 normal.   Pulmonary:      Effort: Pulmonary effort is normal. No respiratory distress. Breath sounds: Normal breath sounds. Abdominal:      General: Bowel sounds are normal. There is no distension. Palpations: Abdomen is soft. Abdomen is not rigid. There is no mass. Tenderness: There is no abdominal tenderness. There is no guarding or rebound. Genitourinary:     General: Normal vulva. Exam position: Lithotomy position. Labia:         Right: No rash, tenderness, lesion or injury. Left: No rash, tenderness, lesion or injury. Vagina: No signs of injury. No vaginal discharge, erythema, tenderness or bleeding. Cervix: No cervical motion tenderness, discharge or friability. Uterus: Not tender. Adnexa:         Right: No mass, tenderness or fullness. Left: No mass, tenderness or fullness. Musculoskeletal:         General: Normal range of motion. Cervical back: Neck supple. Skin:     General: Skin is warm and dry. Findings: No erythema or rash. Nails: There is no clubbing. Neurological:      Mental Status: She is alert and oriented to person, place, and time. Psychiatric:         Speech: Speech normal.         Behavior: Behavior normal. Behavior is cooperative. Thought Content: Thought content normal.         Judgment: Judgment normal.       OBSTETRIC ULTRASOUND--1ST TRIMESTER    DATE: 05/11/2022    PHYSICIAN: JARETH Liu D.O.     SONOGRAPHER: Nubia Paez RDMS    INDICATION: Amenorrhea    TYPE OF SCAN:  vaginal    FINDINGS:      The cul de sac is normal.  The cervix is normal.  The uterus is gravid. The uterus measures 11.28 cm x 7.44 cm x 6.12 cm. No uterine anomalies are evident. The right ovary is present. The right ovary measures 2.6 cm x 2.6 cm x 2.7 cm. The left ovary is present. The left ovary measures 2.1 cm x 1.3 cm x 1.0 cm. There is a single intrauterine pregnancy identified. A fetal pole is noted with a CRL measuring 1.8 cm, consistent with gestational age of 11 weeks and 2 days and EDC of 12/19/2022. There is a 6 day discrepancy when compared with the gestational age of 7 weeks and 3 days and EDC of 12/25/2022 set by FDP (03/20/2022). Yolk sac is present and measures . 61 cm. Fetal cardiac activity is present at 175 bpm.     IMPRESSION:    Single IUP with cardiac activity. Imaging is limited secondary to bowel gas. Patient is well aware of the limitations of ultrasound in the detection of anomalies. Assessment:       Diagnosis Orders   1. Amenorrhea  POCT urine pregnancy    C.trachomatis N.gonorrhoeae DNA    Vaginal Pathogens Probes *A    Culture, Urine    Urinalysis with Microscopic   2. Screening examination for STD (sexually transmitted disease)  C.trachomatis N.gonorrhoeae DNA    Vaginal Pathogens Probes *A   3. Obesity (BMI 30-39.9)     4. History of postpartum depression     5. History of cervical LEEP biopsy affecting care of mother, antepartum             Plan:      Orders Placed This Encounter   Procedures    C.trachomatis N.gonorrhoeae DNA    Culture, Urine    Vaginal Pathogens Probes *A    Urinalysis with Microscopic    POCT urine pregnancy     Continue prenatal vitamins  Rx Zofran  Options for prenatal testing reviewed:  NT, quad screen, free fetal DNA. Risks and benefits discussed. Baseline cervical length at 16 weeks  Follow up prn and 4 weeks for prenatal visit.          Samantha Liu DO

## 2022-06-05 ASSESSMENT — ENCOUNTER SYMPTOMS
NAUSEA: 1
ABDOMINAL DISTENTION: 0
CONSTIPATION: 0
DIARRHEA: 0
VOMITING: 1
ABDOMINAL PAIN: 0
SHORTNESS OF BREATH: 0

## 2022-06-08 ENCOUNTER — INITIAL PRENATAL (OUTPATIENT)
Dept: OBGYN CLINIC | Age: 32
End: 2022-06-08
Payer: COMMERCIAL

## 2022-06-08 VITALS
BODY MASS INDEX: 39.31 KG/M2 | SYSTOLIC BLOOD PRESSURE: 122 MMHG | DIASTOLIC BLOOD PRESSURE: 68 MMHG | HEART RATE: 87 BPM | WEIGHT: 229 LBS

## 2022-06-08 DIAGNOSIS — Z34.81 PRENATAL CARE, SUBSEQUENT PREGNANCY IN FIRST TRIMESTER: Primary | ICD-10-CM

## 2022-06-08 DIAGNOSIS — Z87.59 HISTORY OF POSTPARTUM DEPRESSION: ICD-10-CM

## 2022-06-08 DIAGNOSIS — E66.9 OBESITY (BMI 30-39.9): ICD-10-CM

## 2022-06-08 DIAGNOSIS — Z98.890 HISTORY OF CERVICAL LEEP BIOPSY AFFECTING CARE OF MOTHER, ANTEPARTUM: ICD-10-CM

## 2022-06-08 DIAGNOSIS — Z86.59 HISTORY OF POSTPARTUM DEPRESSION: ICD-10-CM

## 2022-06-08 DIAGNOSIS — Z87.410 HISTORY OF CERVICAL DYSPLASIA: ICD-10-CM

## 2022-06-08 DIAGNOSIS — O34.40 HISTORY OF CERVICAL LEEP BIOPSY AFFECTING CARE OF MOTHER, ANTEPARTUM: ICD-10-CM

## 2022-06-08 LAB
BASOPHILS ABSOLUTE: 0 K/UL (ref 0–0.2)
BASOPHILS RELATIVE PERCENT: 0.5 %
EOSINOPHILS ABSOLUTE: 0.2 K/UL (ref 0–0.6)
EOSINOPHILS RELATIVE PERCENT: 3.7 %
HCT VFR BLD CALC: 40 % (ref 36–48)
HEMOGLOBIN: 13.3 G/DL (ref 12–16)
LYMPHOCYTES ABSOLUTE: 1.7 K/UL (ref 1–5.1)
LYMPHOCYTES RELATIVE PERCENT: 30.6 %
MCH RBC QN AUTO: 30.8 PG (ref 26–34)
MCHC RBC AUTO-ENTMCNC: 33.3 G/DL (ref 31–36)
MCV RBC AUTO: 92.6 FL (ref 80–100)
MONOCYTES ABSOLUTE: 0.4 K/UL (ref 0–1.3)
MONOCYTES RELATIVE PERCENT: 6.8 %
NEUTROPHILS ABSOLUTE: 3.2 K/UL (ref 1.7–7.7)
NEUTROPHILS RELATIVE PERCENT: 58.4 %
PDW BLD-RTO: 13.1 % (ref 12.4–15.4)
PLATELET # BLD: 241 K/UL (ref 135–450)
PMV BLD AUTO: 9.2 FL (ref 5–10.5)
RBC # BLD: 4.32 M/UL (ref 4–5.2)
WBC # BLD: 5.5 K/UL (ref 4–11)

## 2022-06-08 PROCEDURE — 36415 COLL VENOUS BLD VENIPUNCTURE: CPT | Performed by: OBSTETRICS & GYNECOLOGY

## 2022-06-08 PROCEDURE — 0500F INITIAL PRENATAL CARE VISIT: CPT | Performed by: OBSTETRICS & GYNECOLOGY

## 2022-06-08 RX ORDER — DOCUSATE SODIUM 100 MG/1
100 CAPSULE, LIQUID FILLED ORAL 2 TIMES DAILY
COMMUNITY

## 2022-06-08 RX ORDER — ONDANSETRON 4 MG/1
4 TABLET, ORALLY DISINTEGRATING ORAL EVERY 8 HOURS PRN
Qty: 20 TABLET | Refills: 1 | Status: SHIPPED | OUTPATIENT
Start: 2022-06-08 | End: 2022-09-13 | Stop reason: SDUPTHER

## 2022-06-09 LAB
ABO/RH: NORMAL
AMPHETAMINE SCREEN, URINE: NORMAL
ANTIBODY SCREEN: NORMAL
BARBITURATE SCREEN URINE: NORMAL
BENZODIAZEPINE SCREEN, URINE: NORMAL
BUPRENORPHINE URINE: NORMAL
CANNABINOID SCREEN URINE: NORMAL
COCAINE METABOLITE SCREEN URINE: NORMAL
HEPATITIS C VIRUS AB BY CIA INDEX: 0.04 IV
HEPATITIS C VIRUS AB BY CIA INTERPRETATION: NEGATIVE
HIV AG/AB: NORMAL
HIV ANTIGEN: NORMAL
HIV-1 ANTIBODY: NORMAL
HIV-2 AB: NORMAL
Lab: NORMAL
METHADONE SCREEN, URINE: NORMAL
OPIATE SCREEN URINE: NORMAL
OXYCODONE URINE: NORMAL
PH UA: 7
PHENCYCLIDINE SCREEN URINE: NORMAL
PROPOXYPHENE SCREEN: NORMAL
RUBELLA ANTIBODY IGG: 140.1 IU/ML
TOTAL SYPHILLIS IGG/IGM: NORMAL
TSH REFLEX: 0.92 UIU/ML (ref 0.27–4.2)
VARICELLA-ZOSTER VIRUS AB, IGG: NORMAL

## 2022-06-13 NOTE — PROGRESS NOTES
35 y/o  female at 12 weeks 2 days gestation with [facility] 22 presents for initial prenatal visit. Pregnancy is complicated by maternal weight, anxiety, first trimester UTI (treated with Keflex), and history of LEEP procedure. FDLMP: 3/20/22. Patient is 2 years s/p . Pregnancy was complicated by GBBS positive, obesity, anxiety, and history of LEEP procedure. 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 Had normal pap smear in , , 2015, 2016, 2017, 2018, and 2019. Denies vaginal bleeding, loss of fluid and pelvic pain. Absent fetal movement. Denies headache, vision changes and RUQ pain  Admits to fatigue, nausea and constipation. Taking Zofran daily--AM only. Medication has helped. Denies fever, chills, chest pain, shortness of breath, diarrhea, dysuria and hematuria. Maternal Wellness Questionnaire reviewed--no concerns. Diagnosis Orders   1. Prenatal care, subsequent pregnancy in first trimester  CBC with Auto Differential    Type and Screen    HIV Screen    TSH with Reflex    Drug Screen Multi Urine With Bup    Rubella antibody, IgG    Varicella Zoster Antibody, IgG    Syphilis Antibody Cascading Reflex    Hep C AB RLFX HCV PCR-A   2. History of cervical LEEP biopsy affecting care of mother, antepartum     3. History of postpartum depression     4. Obesity (BMI 30-39.9)     5. History of cervical dysplasia       Orders Placed This Encounter   Procedures    CBC with Auto Differential    HIV Screen    TSH with Reflex    Drug Screen Multi Urine With Bup    Rubella antibody, IgG    Varicella Zoster Antibody, IgG    Syphilis Antibody Cascading Reflex    Hep C AB RLFX HCV PCR-A    Type and Screen     Follow up prn and 4 weeks for prenatal visit and baseline cervical length scan.

## 2022-07-05 ENCOUNTER — ROUTINE PRENATAL (OUTPATIENT)
Dept: OBGYN CLINIC | Age: 32
End: 2022-07-05

## 2022-07-05 ENCOUNTER — OFFICE VISIT (OUTPATIENT)
Dept: OBGYN CLINIC | Age: 32
End: 2022-07-05
Payer: COMMERCIAL

## 2022-07-05 VITALS
WEIGHT: 226.2 LBS | HEART RATE: 80 BPM | SYSTOLIC BLOOD PRESSURE: 115 MMHG | DIASTOLIC BLOOD PRESSURE: 68 MMHG | BODY MASS INDEX: 38.83 KG/M2

## 2022-07-05 DIAGNOSIS — Z98.890 HISTORY OF CERVICAL LEEP BIOPSY AFFECTING CARE OF MOTHER, ANTEPARTUM: ICD-10-CM

## 2022-07-05 DIAGNOSIS — O34.40 HISTORY OF CERVICAL LEEP BIOPSY AFFECTING CARE OF MOTHER, ANTEPARTUM: ICD-10-CM

## 2022-07-05 DIAGNOSIS — Z87.59 HISTORY OF POSTPARTUM DEPRESSION: ICD-10-CM

## 2022-07-05 DIAGNOSIS — Z34.82 PRENATAL CARE, SUBSEQUENT PREGNANCY IN SECOND TRIMESTER: Primary | ICD-10-CM

## 2022-07-05 DIAGNOSIS — E66.9 OBESITY (BMI 30-39.9): ICD-10-CM

## 2022-07-05 DIAGNOSIS — Z86.59 HISTORY OF POSTPARTUM DEPRESSION: ICD-10-CM

## 2022-07-05 PROCEDURE — 76817 TRANSVAGINAL US OBSTETRIC: CPT | Performed by: OBSTETRICS & GYNECOLOGY

## 2022-07-05 PROCEDURE — 0502F SUBSEQUENT PRENATAL CARE: CPT | Performed by: OBSTETRICS & GYNECOLOGY

## 2022-07-05 NOTE — PROGRESS NOTES
Temp 97.4 F   Infared     Maternal emotional well being screening form completed and reviewed with patient.  Current score is 12  Patient given referral to [facility] ([phone]): No   .

## 2022-07-07 NOTE — PROGRESS NOTES
35 y/o  female at 16 weeks 1 day gestation with [facility] 22 presents for prenatal visit. Pregnancy is complicated by maternal weight, anxiety, first trimester UTI (treated with Keflex), and history of LEEP procedure. FDLMP: 3/20/22. Patient is 2 years s/p . Pregnancy was complicated by GBBS positive, obesity, anxiety, and history of LEEP procedure. 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 Had normal pap smear in , , , 2016, 2017, 2018, and 2019. Denies vaginal bleeding, loss of fluid and pelvic pain. ? Positive fetal movement. Admits to headaches--intermittent, lasts 3 days, some improvement with Tylenol. Denies vision changes and RUQ pain  Admits to fatigue, nausea with headache and constipation. Nausea has improved. Taking Zofran daily--AM only. Medication has helped. Denies fever, chills, chest pain, shortness of breath, diarrhea, dysuria and hematuria. Maternal Wellness Questionnaire reviewed--no concerns. OB ULTRASOUND CERVICAL LENGTH SCAN    DATE: 2022    PHYSICIAN: JARETH Liu D.O.    SONOGRAPHER: Gerson Denny [facility]    INDICATION: Cervical length    TYPE OF SCAN: vaginal, abdominal    FINDINGS:  A single viable intrauterine pregnancy is noted with a heart rate of 148 bpm. Cardiac and somatic activity are noted. Transvaginal cervical length is 5.0 cm with no funneling noted. IMPRESSION:   Single live IUP in the second trimester. Cervical length is 5.0 cm. Imaging is limited secondary to fetal position. The patient is well aware of the limitations of ultrasound in the detection of anomalies. Diagnosis Orders   1. Prenatal care, subsequent pregnancy in second trimester     2. Obesity (BMI 30-39.9)     3. History of cervical LEEP biopsy affecting care of mother, antepartum     4.  History of postpartum depression       Follow up prn and 4 weeks for prn and 1 month for prenatal visit and ultrasound

## 2022-07-14 ENCOUNTER — TELEPHONE (OUTPATIENT)
Dept: OBGYN CLINIC | Age: 32
End: 2022-07-14

## 2022-07-14 DIAGNOSIS — R30.0 DYSURIA: Primary | ICD-10-CM

## 2022-07-14 RX ORDER — NITROFURANTOIN 25; 75 MG/1; MG/1
100 CAPSULE ORAL 2 TIMES DAILY
Qty: 14 CAPSULE | Refills: 0 | Status: SHIPPED | OUTPATIENT
Start: 2022-07-14 | End: 2022-07-21

## 2022-07-14 NOTE — TELEPHONE ENCOUNTER
Orders signed. Rx for macrobid sent to listed pharmacy in the event symptoms persist despite hydration with water.    Thanks

## 2022-07-14 NOTE — TELEPHONE ENCOUNTER
Pt 16 w 3 d, calling with concern for UTI. She reports urinary frequency and urgency. She says urine has a slight odor. No sedimentation. No dysuria, no fever, mild back pain. Pt advised to come in office to leave a urine. Pended labs . Please advise.

## 2022-07-14 NOTE — TELEPHONE ENCOUNTER
Pt was called and is aware of  Physician response. Will wait to start antibiotic after we receive the urine specimen.  DONE

## 2022-07-15 ENCOUNTER — NURSE ONLY (OUTPATIENT)
Dept: OBGYN CLINIC | Age: 32
End: 2022-07-15

## 2022-07-15 DIAGNOSIS — R30.0 DYSURIA: ICD-10-CM

## 2022-07-15 LAB
BACTERIA: ABNORMAL /HPF
BILIRUBIN URINE: NEGATIVE
BLOOD, URINE: NEGATIVE
CLARITY: CLEAR
COLOR: YELLOW
EPITHELIAL CELLS, UA: 1 /HPF (ref 0–5)
GLUCOSE URINE: NEGATIVE MG/DL
HYALINE CASTS: 0 /LPF (ref 0–8)
KETONES, URINE: NEGATIVE MG/DL
LEUKOCYTE ESTERASE, URINE: NEGATIVE
MICROSCOPIC EXAMINATION: ABNORMAL
NITRITE, URINE: NEGATIVE
PH UA: 8 (ref 5–8)
PROTEIN UA: NEGATIVE MG/DL
RBC UA: 2 /HPF (ref 0–4)
SPECIFIC GRAVITY UA: 1.01 (ref 1–1.03)
URINE TYPE: ABNORMAL
UROBILINOGEN, URINE: 0.2 E.U./DL
WBC UA: 1 /HPF (ref 0–5)

## 2022-07-18 LAB
ORGANISM: ABNORMAL
URINE CULTURE, ROUTINE: ABNORMAL
URINE CULTURE, ROUTINE: ABNORMAL

## 2022-08-09 ENCOUNTER — OFFICE VISIT (OUTPATIENT)
Dept: OBGYN CLINIC | Age: 32
End: 2022-08-09
Payer: COMMERCIAL

## 2022-08-09 ENCOUNTER — OFFICE VISIT (OUTPATIENT)
Dept: OBGYN CLINIC | Age: 32
End: 2022-08-09

## 2022-08-09 VITALS
WEIGHT: 229.4 LBS | DIASTOLIC BLOOD PRESSURE: 76 MMHG | HEART RATE: 90 BPM | TEMPERATURE: 97.7 F | BODY MASS INDEX: 39.38 KG/M2 | SYSTOLIC BLOOD PRESSURE: 110 MMHG

## 2022-08-09 DIAGNOSIS — Z98.890 HISTORY OF CERVICAL LEEP BIOPSY AFFECTING CARE OF MOTHER, ANTEPARTUM: ICD-10-CM

## 2022-08-09 DIAGNOSIS — Z34.82 PRENATAL CARE, SUBSEQUENT PREGNANCY IN SECOND TRIMESTER: Primary | ICD-10-CM

## 2022-08-09 DIAGNOSIS — E66.9 OBESITY (BMI 30-39.9): ICD-10-CM

## 2022-08-09 DIAGNOSIS — Z87.59 HISTORY OF POSTPARTUM DEPRESSION: ICD-10-CM

## 2022-08-09 DIAGNOSIS — O34.40 HISTORY OF CERVICAL LEEP BIOPSY AFFECTING CARE OF MOTHER, ANTEPARTUM: ICD-10-CM

## 2022-08-09 DIAGNOSIS — Z86.59 HISTORY OF POSTPARTUM DEPRESSION: ICD-10-CM

## 2022-08-09 DIAGNOSIS — Z87.410 HISTORY OF CERVICAL DYSPLASIA: ICD-10-CM

## 2022-08-09 PROCEDURE — 0502F SUBSEQUENT PRENATAL CARE: CPT | Performed by: OBSTETRICS & GYNECOLOGY

## 2022-08-09 PROCEDURE — 76805 OB US >/= 14 WKS SNGL FETUS: CPT | Performed by: OBSTETRICS & GYNECOLOGY

## 2022-08-25 ENCOUNTER — TELEPHONE (OUTPATIENT)
Dept: OBGYN CLINIC | Age: 32
End: 2022-08-25

## 2022-08-25 NOTE — TELEPHONE ENCOUNTER
Ok to continue use of pepcid. Ok to use Tums in conjunction with Pepcid. The maximum dose of Tums depends on the strength. Do not use more than 10 tablets (500 mg strength) in a 24 hour period.   Thanks

## 2022-08-25 NOTE — TELEPHONE ENCOUNTER
Pt states she is taking Pepcid regularly for heartburn. She is 23 w3d. She wants to know if its ok to use Tums for breakthrough treatment of the heartburn ( how often) and if continuing Pepcid is ok .  Please advise

## 2022-08-25 NOTE — TELEPHONE ENCOUNTER
[phone] (home)     Placed call to patient, verbalized understanding. No questions or concerns voiced.

## 2022-09-08 NOTE — PROGRESS NOTES
33 y/o  female at 21 weeks 1 day gestation with [facility] 22 presents for prenatal visit. Pregnancy is complicated by maternal weight, anxiety, first trimester UTI (treated with Keflex), and history of LEEP procedure. FDLMP: 3/20/22. Patient is 2 years s/p . Pregnancy was complicated by GBBS positive, obesity, anxiety, and history of LEEP procedure. 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 Had normal pap smear in , , 2015, 2016, 2017, 2018, and 2019. Denies vaginal bleeding, loss of fluid and pelvic pain. Positive fetal movement. Admits to  some headaches. Denies vision changes and RUQ pain  Admits to fatigue, nausea with headache and constipation. Denies fever, chills, chest pain, shortness of breath, diarrhea, dysuria and hematuria. Maternal Wellness Questionnaire reviewed--no concerns. OBSTETRIC ULTRASOUND -- SECOND TRIMESTER       DATE:  22       PHYSICIAN: JARETH Liu D.O.       SONOGRAPHER: MARY Dutta RDMS       INDICATION:  Second trimester, Anatomy screening       TYPE OF SCAN: abdominal and vaginal 3.5 MHz 5MHz       FINDINGS:         A single viable intrauterine pregnancy is noted in [place] presentation. Cardiac and somatic activity are noted. The following values were obtained:   Fetal heart rate 145 bpm   BPD 5.09cm 60.3 %   Head Circumference 18.74cm 36.1 %    Abdominal Circumference 16.95cm 69.2 %   Femur Length 3.88cm 82.6 %   Humerus Length 3.69cm 93.1 %   Cerebellum 2.06cm 15.2 %   Amniotic fluid DVP 4.88cm   EFW 470g 86.3 percentile       Subjective amniotic fluid volume is normal. Based on sonographic criteria, the estimated fetal age is 21 weeks and 4 days with EDC of 22. There is a 3 day discordance with the established EDC of 22. The patient has a posterior placenta that is adequate distance in relation to the internal cervical os.  The evaluation of the lower uterine segment and cervix reveals normal appearing anatomy. Transvaginal cervical length is 3.40cm with no funneling noted. The uterus is unremarkable/gravid. Maternal ovaries and adnexae are not well visualized due to the size of the uterus and patient's gravid state. Normal Anatomy Seen:   4 chamber heart Spine CSP   LVOT Kidneys Lateral ventricles   RVOT Umbilical arteries Cerebellum   Ductal arch Bladder Cisterna magna   Aortic arch Face Nuchal fold   Diaphragm Upper extremities   Stomach Nose/lips Lower extremities   ACI PCI Choroid plexus       Suboptimal anatomy seen: profile, orbits, 3vv       Abnormal anatomy seen: Marginal placental CI       The fetal genitalia is noted to be Female. IMPRESSION:   Single living IUP. No gross structural abnormalities are visualized. Amniotic fluid volume is subjectively normal.  Marginal placental CI is seen on today's ultrasound. The patient is well aware of the limitations of ultrasound in the detection of fetal anomalies. The scan is limited by fetal position. Diagnosis Orders   1. Prenatal care, subsequent pregnancy in second trimester        2. Obesity (BMI 30-39.9)        3. History of cervical LEEP biopsy affecting care of mother, antepartum        4. History of postpartum depression        5.  History of cervical dysplasia          Ultrasound result reviewed--reassurance  Follow up prn and 4 weeks for prenatal visit and follow up ultrasound: cord insertion, profile, orbits, 3VV

## 2022-09-09 ENCOUNTER — OFFICE VISIT (OUTPATIENT)
Dept: OBGYN CLINIC | Age: 32
End: 2022-09-09
Payer: COMMERCIAL

## 2022-09-09 ENCOUNTER — ROUTINE PRENATAL (OUTPATIENT)
Dept: OBGYN CLINIC | Age: 32
End: 2022-09-09

## 2022-09-09 VITALS
BODY MASS INDEX: 39.93 KG/M2 | WEIGHT: 232.6 LBS | DIASTOLIC BLOOD PRESSURE: 60 MMHG | SYSTOLIC BLOOD PRESSURE: 101 MMHG | HEART RATE: 75 BPM

## 2022-09-09 DIAGNOSIS — Z34.82 PRENATAL CARE, SUBSEQUENT PREGNANCY IN SECOND TRIMESTER: Primary | ICD-10-CM

## 2022-09-09 DIAGNOSIS — E66.9 OBESITY (BMI 30-39.9): ICD-10-CM

## 2022-09-09 DIAGNOSIS — Z86.59 HISTORY OF POSTPARTUM DEPRESSION: ICD-10-CM

## 2022-09-09 DIAGNOSIS — Z87.59 HISTORY OF POSTPARTUM DEPRESSION: ICD-10-CM

## 2022-09-09 DIAGNOSIS — Z34.92 PRENATAL CARE IN SECOND TRIMESTER: Primary | ICD-10-CM

## 2022-09-09 DIAGNOSIS — O43.199 MARGINAL INSERTION OF UMBILICAL CORD AFFECTING MANAGEMENT OF MOTHER: ICD-10-CM

## 2022-09-09 DIAGNOSIS — O34.40 HISTORY OF CERVICAL LEEP BIOPSY AFFECTING CARE OF MOTHER, ANTEPARTUM: ICD-10-CM

## 2022-09-09 DIAGNOSIS — Z98.890 HISTORY OF CERVICAL LEEP BIOPSY AFFECTING CARE OF MOTHER, ANTEPARTUM: ICD-10-CM

## 2022-09-09 PROCEDURE — 0502F SUBSEQUENT PRENATAL CARE: CPT | Performed by: OBSTETRICS & GYNECOLOGY

## 2022-09-09 PROCEDURE — 76816 OB US FOLLOW-UP PER FETUS: CPT | Performed by: OBSTETRICS & GYNECOLOGY

## 2022-09-09 NOTE — PROGRESS NOTES
Return OB Office Visit    CC:   Chief Complaint   Patient presents with    Routine Prenatal Visit       HPI:  Pt seen and examined. No concerns/complaints. Denies VB, LOF, ctx. +FM. Nausea has returned, using zofran as well as heartburn meds which seem to help some. Maternal wellness questionnaire reviewed - no concerns today. Score 11. Objective:  /60   Pulse 75   Wt 232 lb 9.6 oz (105.5 kg)   LMP 02/09/2022 (Exact Date)   BMI 39.93 kg/m²   Gen: AO, NAD  Abd: Soft, NT  FHT: 145    Assessment/Plan:  34 y.o. [id number] at 25w4d (Estimated Date of Delivery: 12/19/22) presents for PIERRE appointment:      Diagnosis Orders   1. Prenatal care in second trimester        2. Marginal insertion of umbilical cord affecting management of mother        3. History of cervical LEEP biopsy affecting care of mother, antepartum        4. Obesity (BMI 30-39.9)        5.  History of postpartum depression          Doing well today, routine care  - FWB reassuring on US, anatomy completed  - marginal cord insertion again appreciated, recommend q4wk growth US, scheduled  - return/labor precautions reviewed, all questions answered    Dispo: RTC in 4 weeks, GTT/CBC next visit  Mukund Traore MD

## 2022-09-09 NOTE — PROGRESS NOTES
Temp-97.6F infrared  Maternal emotional well being screening form completed and reviewed with patient. Current score is 11.

## 2022-09-13 RX ORDER — ONDANSETRON 4 MG/1
4 TABLET, ORALLY DISINTEGRATING ORAL EVERY 8 HOURS PRN
Qty: 20 TABLET | Refills: 1 | Status: SHIPPED | OUTPATIENT
Start: 2022-09-13

## 2022-10-05 ENCOUNTER — ROUTINE PRENATAL (OUTPATIENT)
Dept: OBGYN CLINIC | Age: 32
End: 2022-10-05
Payer: COMMERCIAL

## 2022-10-05 ENCOUNTER — OFFICE VISIT (OUTPATIENT)
Dept: OBGYN CLINIC | Age: 32
End: 2022-10-05
Payer: COMMERCIAL

## 2022-10-05 VITALS
DIASTOLIC BLOOD PRESSURE: 64 MMHG | WEIGHT: 236.6 LBS | HEART RATE: 98 BPM | TEMPERATURE: 97.2 F | BODY MASS INDEX: 40.61 KG/M2 | SYSTOLIC BLOOD PRESSURE: 94 MMHG

## 2022-10-05 DIAGNOSIS — Z86.59 HISTORY OF POSTPARTUM DEPRESSION: ICD-10-CM

## 2022-10-05 DIAGNOSIS — O99.210 MATERNAL OBESITY AFFECTING PREGNANCY, ANTEPARTUM: ICD-10-CM

## 2022-10-05 DIAGNOSIS — O43.199 MARGINAL INSERTION OF UMBILICAL CORD AFFECTING MANAGEMENT OF MOTHER: ICD-10-CM

## 2022-10-05 DIAGNOSIS — Z36.89 ENCOUNTER FOR ULTRASOUND TO ASSESS FETAL GROWTH: Primary | ICD-10-CM

## 2022-10-05 DIAGNOSIS — Z34.83 PRENATAL CARE, SUBSEQUENT PREGNANCY IN THIRD TRIMESTER: Primary | ICD-10-CM

## 2022-10-05 DIAGNOSIS — O34.40 HISTORY OF CERVICAL LEEP BIOPSY AFFECTING CARE OF MOTHER, ANTEPARTUM: ICD-10-CM

## 2022-10-05 DIAGNOSIS — Z98.890 HISTORY OF CERVICAL LEEP BIOPSY AFFECTING CARE OF MOTHER, ANTEPARTUM: ICD-10-CM

## 2022-10-05 DIAGNOSIS — Z87.59 HISTORY OF POSTPARTUM DEPRESSION: ICD-10-CM

## 2022-10-05 PROCEDURE — 36415 COLL VENOUS BLD VENIPUNCTURE: CPT | Performed by: OBSTETRICS & GYNECOLOGY

## 2022-10-05 PROCEDURE — 0502F SUBSEQUENT PRENATAL CARE: CPT | Performed by: OBSTETRICS & GYNECOLOGY

## 2022-10-05 PROCEDURE — 90471 IMMUNIZATION ADMIN: CPT | Performed by: OBSTETRICS & GYNECOLOGY

## 2022-10-05 PROCEDURE — 90715 TDAP VACCINE 7 YRS/> IM: CPT | Performed by: OBSTETRICS & GYNECOLOGY

## 2022-10-05 PROCEDURE — 76816 OB US FOLLOW-UP PER FETUS: CPT | Performed by: OBSTETRICS & GYNECOLOGY

## 2022-10-05 NOTE — PROGRESS NOTES
.eastobgynglucose    Patient started drink at 9:33 and finished at 9:36. Patient tolerated drink well.   1 green and lavender tube drawn at 10:36 am .  # 50 grams of Glucola. No complain of nausea and vomiting at this time, will continue to monitor. 10:46 AM Given Tdap (Adacel) vaccine 0.5mL IM  Site:Left deltoid. Lot #[id number]  Expiration Date: 11/29/24  [facility] #[id number]. Patient tolerated well. No reaction noted after 20 minutes. VIS sheet provided. Administered by: Luciana Silva LPN     Vaccine Information Sheet, \"Influenza - Inactivated\"  given to Patricia Peterer, or parent/legal guardian of  Patricia Bocanegra and verbalized understanding. Patient responses:    Have you ever had a reaction to a flu vaccine? No  Are you able to eat eggs without adverse effects? Yes  Do you have any current illness? No  Have you ever had Guillian Trafford Syndrome? No    Flu vaccine given per order. Please see immunization tab.

## 2022-10-06 LAB
ATYPICAL LYMPHOCYTE RELATIVE PERCENT: 1 % (ref 0–6)
BANDED NEUTROPHILS RELATIVE PERCENT: 1 % (ref 0–7)
BASOPHILS ABSOLUTE: 0 K/UL (ref 0–0.2)
BASOPHILS RELATIVE PERCENT: 0 %
EOSINOPHILS ABSOLUTE: 0.2 K/UL (ref 0–0.6)
EOSINOPHILS RELATIVE PERCENT: 3 %
GLUCOSE CHALLENGE: 105 MG/DL
HCT VFR BLD CALC: 36.5 % (ref 36–48)
HEMOGLOBIN: 12 G/DL (ref 12–16)
LYMPHOCYTES ABSOLUTE: 1.5 K/UL (ref 1–5.1)
LYMPHOCYTES RELATIVE PERCENT: 18 %
MCH RBC QN AUTO: 31.2 PG (ref 26–34)
MCHC RBC AUTO-ENTMCNC: 32.8 G/DL (ref 31–36)
MCV RBC AUTO: 95.3 FL (ref 80–100)
MONOCYTES ABSOLUTE: 0.3 K/UL (ref 0–1.3)
MONOCYTES RELATIVE PERCENT: 4 %
NEUTROPHILS ABSOLUTE: 5.9 K/UL (ref 1.7–7.7)
NEUTROPHILS RELATIVE PERCENT: 73 %
PDW BLD-RTO: 14.1 % (ref 12.4–15.4)
PLATELET # BLD: 223 K/UL (ref 135–450)
PLATELET SLIDE REVIEW: ADEQUATE
PMV BLD AUTO: 9.4 FL (ref 5–10.5)
RBC # BLD: 3.83 M/UL (ref 4–5.2)
RBC # BLD: NORMAL 10*6/UL
SLIDE REVIEW: ABNORMAL
WBC # BLD: 8 K/UL (ref 4–11)

## 2022-10-06 NOTE — PROGRESS NOTES
33 y/o  female at 29 weeks 2 days gestation with [facility] 22 presents for prenatal visit. Pregnancy is complicated by marginal cord insertion, maternal weight, anxiety, first trimester UTI (treated with Keflex), and history of LEEP procedure. FDLMP: 3/20/22. Patient is 2 years s/p . Pregnancy was complicated by GBS positive, obesity, anxiety, and history of LEEP procedure. 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 Had normal pap smear in , , 2015, 2016, 2017, 2018, and 2019. Denies vaginal bleeding, loss of fluid and pelvic pain. Positive fetal movement. Admits to  some headaches. Denies vision changes and RUQ pain  Admits to fatigue, nausea with headache and constipation. Denies fever, chills, chest pain, shortness of breath, diarrhea, dysuria and hematuria. Maternal Wellness Questionnaire reviewed--no concerns. OBSTETRIC ULTRASOUND GROWTH    DATE: 10/6/22    PHYSICIAN: JARETH Liu D.O.     SONOGRAPHER: MARY Dutta RDMS    INDICATION: Growth, MCI    TYPE OF SCAN: abdominal    FINDINGS:  A single viable intrauterine pregnancy is noted in cephalic presentation. Cardiac and somatic activity are noted. The following values were obtained:   Fetal heart rate    146 bpm   BPD      7.39cm   49.3 %   Head Circumference    28.12cm  62.3 %    Abdominal Circumference   26.11cm  73.0 %   Femur Length     5.98cm   84.2 %   Amniotic fluid index    16.86cm   EFW      1593g   80.4 percentile    Amniotic fluid volume is normal. Based on sonographic criteria the estimated fetal age is 30 weeks and 3 days with EDC of 22. There is a 8 day discordance with the established EDC of 22. The patient has a posterior placenta that is adequate distance in relation to the internal cervical os. The evaluation of the lower uterine segment and cervix reveals normal appearing anatomy.   The uterus is unremarkable/gravid. Maternal ovaries and adnexae are not well visualized due to the size of the uterus and patient's gravid state. Anatomy seen includes: heart, stomach, kidneys, bladder    IMPRESSION:  Single live IUP in the second trimester. Adequate interval fetal growth. Marginal PCI is noted again on today's scan. Imaging is limited secondary to fetal position. The patient is well aware of the limitations of ultrasound in the detection of anomalies. Diagnosis Orders   1. Prenatal care, subsequent pregnancy in third trimester  GLUCOSE CHALLENGE GESTATIONAL    CBC with Auto Differential      2. Maternal obesity affecting pregnancy, antepartum        3. Marginal insertion of umbilical cord affecting management of mother        4. History of cervical LEEP biopsy affecting care of mother, antepartum        5. History of postpartum depression          Orders Placed This Encounter   Procedures    GLUCOSE CHALLENGE GESTATIONAL    CBC with Auto Differential     Ultrasound result reviewed. Growth ultrasound every 4 weeks.   Follow up prn and 2 weeks for prenatal visit

## 2022-10-19 ENCOUNTER — ROUTINE PRENATAL (OUTPATIENT)
Dept: OBGYN CLINIC | Age: 32
End: 2022-10-19

## 2022-10-19 VITALS
HEART RATE: 96 BPM | WEIGHT: 237.6 LBS | DIASTOLIC BLOOD PRESSURE: 64 MMHG | SYSTOLIC BLOOD PRESSURE: 104 MMHG | BODY MASS INDEX: 40.78 KG/M2

## 2022-10-19 DIAGNOSIS — Z86.59 HISTORY OF POSTPARTUM DEPRESSION: ICD-10-CM

## 2022-10-19 DIAGNOSIS — Z34.83 PRENATAL CARE, SUBSEQUENT PREGNANCY IN THIRD TRIMESTER: Primary | ICD-10-CM

## 2022-10-19 DIAGNOSIS — Z98.890 HISTORY OF CERVICAL LEEP BIOPSY AFFECTING CARE OF MOTHER, ANTEPARTUM: ICD-10-CM

## 2022-10-19 DIAGNOSIS — O43.199 MARGINAL INSERTION OF UMBILICAL CORD AFFECTING MANAGEMENT OF MOTHER: ICD-10-CM

## 2022-10-19 DIAGNOSIS — O99.210 MATERNAL OBESITY AFFECTING PREGNANCY, ANTEPARTUM: ICD-10-CM

## 2022-10-19 DIAGNOSIS — Z87.59 HISTORY OF POSTPARTUM DEPRESSION: ICD-10-CM

## 2022-10-19 DIAGNOSIS — O34.40 HISTORY OF CERVICAL LEEP BIOPSY AFFECTING CARE OF MOTHER, ANTEPARTUM: ICD-10-CM

## 2022-10-19 PROCEDURE — 0502F SUBSEQUENT PRENATAL CARE: CPT | Performed by: OBSTETRICS & GYNECOLOGY

## 2022-10-19 NOTE — PROGRESS NOTES
Return OB Office Visit    CC:   Chief Complaint   Patient presents with    Routine Prenatal Visit       HPI:  Patient seen and examined. Patient is doing well today. Denies VB, LOF, ctx. +FM. Denies headaches, vision changes, RUQ pain, increased LE edema. Denies chest pain, shortness of breath, fever, chills. Has had some mild nausea. Review of Systems: The following ROS was otherwise negative, except as noted in the HPI: constitutional, HEENT, respiratory, cardiovascular, gastrointestinal, genitourinary, skin, musculoskeletal, neurological, psych    Objective:  /64   Pulse 96   Wt 237 lb 9.6 oz (107.8 kg)   LMP 02/09/2022 (Exact Date)   BMI 40.78 kg/m²     Physical Exam  Vitals reviewed. Constitutional:       General: She is not in acute distress. Appearance: She is well-developed. HENT:      Head: Normocephalic and atraumatic. Eyes:      Conjunctiva/sclera: Conjunctivae normal.   Cardiovascular:      Rate and Rhythm: Normal rate. Pulmonary:      Effort: Pulmonary effort is normal. No respiratory distress. Abdominal:      General: There is no distension. Palpations: Abdomen is soft. Tenderness: There is no abdominal tenderness. There is no guarding or rebound. Musculoskeletal:         General: No swelling. Skin:     General: Skin is warm and dry. Neurological:      Mental Status: She is alert and oriented to person, place, and time. Psychiatric:         Mood and Affect: Mood normal.         Behavior: Behavior normal.         Thought Content: Thought content normal.       FHR: 148 bpm by doppler    Assessment/Plan:   Abhijit Philippe is a 34 y.o. T2M2017 at 31w2d who presents for routine OB visit    1.  Prenatal care, subsequent pregnancy in third trimester     - Patient is doing well today      - Fetal wellbeing reassuring by FH and FHR     - Maternal wellness questionnaire reviewed - no concerns today, score 13     - 28 week labs within normal limits     - Labor, decreased FM, VB, LOF precautions reviewed      - Return precautions reviewed     - Will follow-up in 2 weeks for PIERRE visit, growth scan and initiation of ANFS    2. Marginal insertion of umbilical cord affecting management of mother     - EFW 10/6/2022 80.4 %ile     - Will continue with serial growth scans (due at next visit)    3. Maternal obesity affecting pregnancy, antepartum     - Pre-pregnancy BMI 39.97     - TWG 4 lbs     - Current BMI 40.78     - Reviewed with patient and will initiate ANFS at next visit     4. History of postpartum depression     - Doing well today     5.  History of cervical LEEP biopsy affecting care of mother, antepartum    Lexis Gerber DO

## 2022-10-19 NOTE — PROGRESS NOTES
Temp 97 F   infared     Maternal emotional well being screening form completed and reviewed with patient. Current score is 13. Patient given referral to [facility] ([phone]):  No

## 2022-10-24 ENCOUNTER — TELEPHONE (OUTPATIENT)
Dept: OBGYN CLINIC | Age: 32
End: 2022-10-24

## 2022-10-27 ENCOUNTER — TELEPHONE (OUTPATIENT)
Dept: OBGYN CLINIC | Age: 32
End: 2022-10-27

## 2022-10-27 NOTE — TELEPHONE ENCOUNTER
Pt states she started drinking an herbal tea yesterday morning and just realized it says not recommended in pregnancy. Pt states she has been taking Luden losanges and Delsym. Pt states she has been sleeping with a humidifier and head elevated with no relief. Pt stated she went to Urgent Care 2 nights ago and was given a Zpack. Pt states she took second dose last night. Pt states she put honey and lemon in tea and would like to drink some type of herbal tea. Pt asked for any herbal tea recommendations. Please advise.

## 2022-10-27 NOTE — TELEPHONE ENCOUNTER
Has patient been tested for COVID-19. Please advise patient that there are no definitive safety studies for tea and most foods. Similar to vitamins and minerals, most foods do not require testing by the FDA, therefore it is difficult to know what is truly safe. Most foods, when eaten or taken in moderation, are generally safe. It is felt that raspberry leaf, peppermint, dara, and lemon balm teas are ok in pregnancy.    Thanks

## 2022-10-28 ENCOUNTER — TELEPHONE (OUTPATIENT)
Dept: FAMILY MEDICINE CLINIC | Age: 32
End: 2022-10-28

## 2022-10-28 ENCOUNTER — TELEMEDICINE (OUTPATIENT)
Dept: PRIMARY CARE CLINIC | Age: 32
End: 2022-10-28
Payer: COMMERCIAL

## 2022-10-28 DIAGNOSIS — J06.9 ACUTE URI: Primary | ICD-10-CM

## 2022-10-28 PROCEDURE — 99213 OFFICE O/P EST LOW 20 MIN: CPT | Performed by: NURSE PRACTITIONER

## 2022-10-28 ASSESSMENT — ENCOUNTER SYMPTOMS
SHORTNESS OF BREATH: 0
WHEEZING: 0
SORE THROAT: 1
COUGH: 1
RHINORRHEA: 1

## 2022-10-28 NOTE — PROGRESS NOTES
Lilli Klinefelter (:  1990) is a Established patient, here for evaluation of the following:    Cough (X 2 weeks, went to [facility] Tuesday)       Assessment & Plan:  Below is the assessment and plan developed based on review of pertinent history, physical exam, labs, studies, and medications. 1. Acute URI  Saline sinus irrigation at least 3 times daily, tylenol for fever or pain, warm tea with honey, humidifier in bedroom, afrin for up to 3 days or six doses, finish antibiotics as prescribed. Follow up with pcp on 10/31/22. The patient was educated on reasons to seek urgent medical care including chest pain, shortness of breath or difficulty breathing at rest, severe pain, fever >102 not controlled by medication, unrelenting vomiting or diarrhea, blue-tinged lips or nailbeds, and severe weakness or confusion. Patient verbalized understanding    Return in about 3 days (around 10/31/2022), or if symptoms worsen or fail to improve, for uri. Subjective: went to [facility] Tuesday and was diagnosed with URI and prescribed a z-pack but she hasn't improved. Her 1 yo is also sick. Taking delsym without relief. Cough  This is a new problem. The current episode started 1 to 4 weeks ago. The problem has been unchanged. The problem occurs constantly. The cough is Productive of sputum. Associated symptoms include nasal congestion, rhinorrhea and a sore throat. Pertinent negatives include no chest pain, chills, fever, postnasal drip, shortness of breath or wheezing. She has tried OTC cough suppressant for the symptoms. The treatment provided no relief. There is no history of asthma, bronchitis, COPD, environmental allergies or pneumonia. Review of Systems   Constitutional:  Negative for chills and fever. HENT:  Positive for rhinorrhea and sore throat. Negative for postnasal drip. Respiratory:  Positive for cough. Negative for shortness of breath and wheezing. Cardiovascular:  Negative for chest pain. Allergic/Immunologic: Negative for environmental allergies.      Objective:  Patient-Reported Vitals  No data recorded     Patient-Reported Vitals 1/21/2022   Patient-Reported Weight 220   Patient-Reported Height 54   Patient-Reported Systolic -   Patient-Reported Diastolic -   Patient-Reported Pulse -        Physical Exam:  [INSTRUCTIONS:  \"[x]\" Indicates a positive item  \"[]\" Indicates a negative item  -- DELETE ALL ITEMS NOT EXAMINED]    Constitutional: [x] Appears well-developed and well-nourished [x] No apparent distress      [] Abnormal -     Mental status: [x] Alert and awake  [x] Oriented to person/place/time [x] Able to follow commands    [] Abnormal -     Eyes:   EOM    [x]  Normal    [] Abnormal -   Sclera  [x]  Normal    [] Abnormal -          Discharge [x]  None visible   [] Abnormal -     HENT: [x] Normocephalic, atraumatic  [] Abnormal -   [] Mouth/Throat: Mucous membranes are moist    External Ears [x] Normal  [] Abnormal -    Neck: [x] No visualized mass [] Abnormal -     Pulmonary/Chest: [x] Respiratory effort normal   [x] No visualized signs of difficulty breathing or respiratory distress        [] Abnormal -      Musculoskeletal:   [] Normal gait with no signs of ataxia         [x] Normal range of motion of neck        [] Abnormal -     Neurological:        [x] No Facial Asymmetry (Cranial nerve 7 motor function) (limited exam due to video visit)          [x] No gaze palsy        [] Abnormal -          Skin:        [x] No significant exanthematous lesions or discoloration noted on facial skin         [] Abnormal -            Psychiatric:       [x] Normal Affect [] Abnormal -        [] No Hallucinations    Other pertinent observable physical exam findings:-  The majority of this visit was conducted with audio only due to technology difficulty      On this date 10/28/2022 I have spent 22 minutes reviewing previous notes, test results and face to face (virtual) with the patient discussing the diagnosis and importance of compliance with the treatment plan as well as documenting on the day of the visit. Buck Portillo, was evaluated through a synchronous (real-time) audio-video encounter. The patient (or guardian if applicable) is aware that this is a billable service, which includes applicable co-pays. This Virtual Visit was conducted with patient's (and/or legal guardian's) consent. The visit was conducted pursuant to the emergency declaration under the [address] [place] [address] and the [employer] General Act. Patient identification was verified, and a caregiver was present when appropriate. The patient was located at Home: [address] [id number].    Provider was located at Home ([facility]): [address] [facility]

## 2022-10-28 NOTE — LETTER
I had the pleasure of seeing Duana Habermann today for a primary care virtualist video visit secondary to acute uri. I have provided the following recommendations: saline sinus irrigation, afrin nasal spray for up to 3 days, warm tea with honey, humidifier in bedroom. I have included my note for your review and have asked the patient to follow up with you on 10/31/22. If you have questions, please reach out via [employer] secure messaging by searching for the [facility] Primary Care Virtualists. Your communication will be answered promptly by the Virtualist on service for the day. Additionally, we would love your overall feedback on this visit. Please hit shift and click the following link to let us know if the Virtualist service met your expectations. [employer] [id number]      Electronically signed by Vanda Felty, APRN - CNP on 10/28/22 at 1:20 PM EDT.

## 2022-10-28 NOTE — TELEPHONE ENCOUNTER
----- Message from Denise Osorio MA sent at 10/28/2022  8:21 AM EDT -----  Subject: Message to Provider    QUESTIONS  Information for Provider? Pt called regarding recently being seen @ [facility],   scheduled appt for 10/31, but requested a message be sent over also to see   if either a sooner appt is available or if a different ATB can be sent   over as the z-pac she was given is not helping and she only has 2 days   left of it. Pt is 32 weeks pregnant and has been unable to reach her   OB-GYN for assistance. Pt was dx'd with a URI. Please call pt back @   number below to discuss either a sooner appt or ATB options. Thank you so   much!  ---------------------------------------------------------------------------  --------------  Alicja BLACK  [phone]; OK to leave message on voicemail  ---------------------------------------------------------------------------  --------------  SCRIPT ANSWERS  Relationship to Patient?  Self

## 2022-10-28 NOTE — TELEPHONE ENCOUNTER
Patient has been tested for flu and covid-19, both negative. Patient will give the tea a try and let us know if she needs anything else. Routing to Dr. Celsa Moore as Altagracia Griggs.

## 2022-10-30 PROBLEM — O99.210 MATERNAL OBESITY AFFECTING PREGNANCY, ANTEPARTUM: Status: ACTIVE | Noted: 2022-10-30

## 2022-11-02 ENCOUNTER — OFFICE VISIT (OUTPATIENT)
Dept: OBGYN CLINIC | Age: 32
End: 2022-11-02
Payer: COMMERCIAL

## 2022-11-02 ENCOUNTER — ROUTINE PRENATAL (OUTPATIENT)
Dept: OBGYN CLINIC | Age: 32
End: 2022-11-02

## 2022-11-02 DIAGNOSIS — J06.9 UPPER RESPIRATORY TRACT INFECTION, UNSPECIFIED TYPE: ICD-10-CM

## 2022-11-02 DIAGNOSIS — Z87.59 HISTORY OF POSTPARTUM DEPRESSION: ICD-10-CM

## 2022-11-02 DIAGNOSIS — O43.199 MARGINAL INSERTION OF UMBILICAL CORD AFFECTING MANAGEMENT OF MOTHER: ICD-10-CM

## 2022-11-02 DIAGNOSIS — Z86.59 HISTORY OF POSTPARTUM DEPRESSION: ICD-10-CM

## 2022-11-02 DIAGNOSIS — Z34.83 PRENATAL CARE, SUBSEQUENT PREGNANCY IN THIRD TRIMESTER: Primary | ICD-10-CM

## 2022-11-02 DIAGNOSIS — O34.40 HISTORY OF CERVICAL LEEP BIOPSY AFFECTING CARE OF MOTHER, ANTEPARTUM: ICD-10-CM

## 2022-11-02 DIAGNOSIS — Z87.410 HISTORY OF CERVICAL DYSPLASIA: ICD-10-CM

## 2022-11-02 DIAGNOSIS — O43.199 MARGINAL INSERTION OF UMBILICAL CORD AFFECTING MANAGEMENT OF MOTHER: Primary | ICD-10-CM

## 2022-11-02 DIAGNOSIS — O99.210 OBESITY IN PREGNANCY, ANTEPARTUM: ICD-10-CM

## 2022-11-02 DIAGNOSIS — O99.210 MATERNAL OBESITY AFFECTING PREGNANCY, ANTEPARTUM: ICD-10-CM

## 2022-11-02 DIAGNOSIS — Z98.890 HISTORY OF CERVICAL LEEP BIOPSY AFFECTING CARE OF MOTHER, ANTEPARTUM: ICD-10-CM

## 2022-11-02 PROCEDURE — 76816 OB US FOLLOW-UP PER FETUS: CPT | Performed by: OBSTETRICS & GYNECOLOGY

## 2022-11-02 PROCEDURE — 0502F SUBSEQUENT PRENATAL CARE: CPT | Performed by: OBSTETRICS & GYNECOLOGY

## 2022-11-09 ENCOUNTER — PATIENT MESSAGE (OUTPATIENT)
Dept: OBGYN CLINIC | Age: 32
End: 2022-11-09

## 2022-11-09 NOTE — TELEPHONE ENCOUNTER
From: Faye Braun  To: Dr. Marylu Bruce: 11/9/2022 9:51 AM EST  Subject: Cough update, & vomiting    Hello, my cough is probably 90% better since my last visit - I started with the allergy med then moved to Mucinex DM and have noticed great improvement over the past 3-4 days. My new/worsening issue now is nausea & vomiting - this past weekend I started taking 4mg of Zofran to get my through my work day, and another 4mg about 15 minutes before I get into bed at night, but I continue to vomit on a daily or nightly basis. The past 2 nights I have been up through the night with severe nausea, and last night experienced some pretty significant vomiting. I know I am scheduled to see you again in just 2 days but was unsure if there was something more I could do in the meantime - this has really, really interfered with eating and sleeping.     Thank you! -Rosey Denise

## 2022-11-10 VITALS — WEIGHT: 233.2 LBS | BODY MASS INDEX: 40.03 KG/M2 | HEART RATE: 95 BPM

## 2022-11-10 RX ORDER — PROMETHAZINE HYDROCHLORIDE 25 MG/1
12.5 SUPPOSITORY RECTAL EVERY 6 HOURS PRN
Qty: 14 SUPPOSITORY | Refills: 0 | Status: SHIPPED | OUTPATIENT
Start: 2022-11-10 | End: 2022-11-17

## 2022-11-10 NOTE — PROGRESS NOTES
35 y/o  female at 33 weeks 2 days gestation with [facility] 22 presents for prenatal visit. Pregnancy is complicated by marginal cord insertion (serial growth scans every 4 weeks), maternal weight, anxiety, first trimester UTI (treated with Keflex), and history of LEEP procedure. FDLMP: 3/20/22. Patient is 2 years s/p . Pregnancy was complicated by GBS positive, obesity, anxiety, and history of LEEP procedure. 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 Had normal pap smear in , , , 2016, 2017, 2018, and 2019. Denies vaginal bleeding, loss of fluid and pelvic pain. Positive fetal movement. Admits to  some headaches. Denies vision changes and RUQ pain  Admits to fatigue, nausea with headache and constipation. Has been dealing with URI--cough and congestion for 3 weeks. 3-4 x per week will cough until she vomits. Seen by primary care--felt to be viral.  Has been treated with 5 day course of Zithromax (last week). Has used saline nasal spray 2-3 x per day and Delsym x 2 weeks. Has also been on proton pump inhibitor. Notes shortness of breath felt to be secondary to cough and congestion. Tested negative for flu and COVID at Urgent Care. Denies fever, chills, chest pain, shortness of breath, diarrhea, dysuria and hematuria. Maternal Wellness Questionnaire reviewed--no concerns. OBSTETRIC ULTRASOUND GROWTH    DATE: 22    PHYSICIAN: JARETH Liu D.O.     SONOGRAPHER: MARY Dutta [facility]    INDICATION: Growth, MCI, BPP    TYPE OF SCAN: abdominal    FINDINGS:  A single viable intrauterine pregnancy is noted in cephalic presentation. Cardiac and somatic activity are noted.     The following values were obtained:   Fetal heart rate    147 bpm   BPD      8.46cm   68.3 %   Head Circumference    31.73cm  75.2 %    Abdominal Circumference   32.03cm  98.2 %   Femur Length     6.65cm   64.8 %   Amniotic fluid index    14.79cm   EFW      2636g   91.9 percentile    Amniotic fluid volume is normal. Based on sonographic criteria the estimated fetal age is 35 weeks and 0 days with EDC of 12/7/22. There is a 12 day discordance with the established EDC of 12/19/22. The patient has a right lateral placenta that is adequate distance in relation to the internal cervical os. The evaluation of the lower uterine segment and cervix reveals normal appearing anatomy. The uterus is unremarkable/gravid. Maternal ovaries and adnexae are not well visualized due to the size of the uterus and patient's gravid state. Anatomy seen includes: heart, stomach, kidneys, bladder    IMPRESSION:  Single live IUP in the third trimester. Adequate interval fetal growth. MCI is again noted on today's exam.  BPP 8/8. Imaging is limited secondary to fetal position. The patient is well aware of the limitations of ultrasound in the detection of anomalies. Diagnosis Orders   1. Prenatal care, subsequent pregnancy in third trimester        2. Obesity in pregnancy, antepartum        3. Maternal obesity affecting pregnancy, antepartum        4. Marginal insertion of umbilical cord affecting management of mother        5. History of postpartum depression        6. History of cervical LEEP biopsy affecting care of mother, antepartum        7. History of cervical dysplasia        8. Upper respiratory tract infection, unspecified type          Conservative interventions for URI reviewed:  Chlorpheniramine, mucinex, phenylephrin. Consider course of Bactrim if no improvement. Consider chest XR. Growth scan every 4 weeks. Follow up prn and 2 weeks for prenatal visit and ultrasound.

## 2022-11-11 ENCOUNTER — OFFICE VISIT (OUTPATIENT)
Dept: OBGYN CLINIC | Age: 32
End: 2022-11-11
Payer: COMMERCIAL

## 2022-11-11 ENCOUNTER — ROUTINE PRENATAL (OUTPATIENT)
Dept: OBGYN CLINIC | Age: 32
End: 2022-11-11

## 2022-11-11 VITALS
SYSTOLIC BLOOD PRESSURE: 134 MMHG | BODY MASS INDEX: 38.96 KG/M2 | HEART RATE: 93 BPM | WEIGHT: 227 LBS | DIASTOLIC BLOOD PRESSURE: 82 MMHG

## 2022-11-11 DIAGNOSIS — Z98.890 HISTORY OF CERVICAL LEEP BIOPSY AFFECTING CARE OF MOTHER, ANTEPARTUM: ICD-10-CM

## 2022-11-11 DIAGNOSIS — O99.210 OBESITY IN PREGNANCY, ANTEPARTUM: ICD-10-CM

## 2022-11-11 DIAGNOSIS — O34.40 HISTORY OF CERVICAL LEEP BIOPSY AFFECTING CARE OF MOTHER, ANTEPARTUM: ICD-10-CM

## 2022-11-11 DIAGNOSIS — O43.199 MARGINAL INSERTION OF UMBILICAL CORD AFFECTING MANAGEMENT OF MOTHER: ICD-10-CM

## 2022-11-11 DIAGNOSIS — Z34.83 PRENATAL CARE, SUBSEQUENT PREGNANCY IN THIRD TRIMESTER: Primary | ICD-10-CM

## 2022-11-11 DIAGNOSIS — O99.210 MATERNAL OBESITY AFFECTING PREGNANCY, ANTEPARTUM: ICD-10-CM

## 2022-11-11 DIAGNOSIS — Z86.59 HISTORY OF POSTPARTUM DEPRESSION: ICD-10-CM

## 2022-11-11 DIAGNOSIS — Z87.410 HISTORY OF CERVICAL DYSPLASIA: ICD-10-CM

## 2022-11-11 DIAGNOSIS — Z87.59 HISTORY OF POSTPARTUM DEPRESSION: ICD-10-CM

## 2022-11-11 PROCEDURE — 0502F SUBSEQUENT PRENATAL CARE: CPT | Performed by: OBSTETRICS & GYNECOLOGY

## 2022-11-11 PROCEDURE — 76819 FETAL BIOPHYS PROFIL W/O NST: CPT | Performed by: OBSTETRICS & GYNECOLOGY

## 2022-11-11 RX ORDER — PROMETHAZINE HYDROCHLORIDE 25 MG/1
25 TABLET ORAL 3 TIMES DAILY PRN
Qty: 20 TABLET | Refills: 0 | Status: SHIPPED | OUTPATIENT
Start: 2022-11-11 | End: 2022-11-18

## 2022-11-11 RX ORDER — METOCLOPRAMIDE 10 MG/1
10 TABLET ORAL
Qty: 30 TABLET | Refills: 0 | Status: SHIPPED | OUTPATIENT
Start: 2022-11-11

## 2022-11-11 RX ORDER — OMEPRAZOLE 10 MG/1
10 CAPSULE, DELAYED RELEASE ORAL DAILY
COMMUNITY

## 2022-11-11 NOTE — PROGRESS NOTES
33 y/o  female at 34 weeks 4 days gestation with [facility] 22 presents for prenatal visit. Pregnancy is complicated by marginal cord insertion (serial growth scans every 4 weeks), maternal weight, anxiety, first trimester UTI (treated with Keflex), and history of LEEP procedure. FDLMP: 3/20/22. Patient is 2 years s/p . Previous pregnancy was complicated by GBS positive, obesity, anxiety, and history of LEEP procedure. 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 Had normal pap smear in , , 2015, 2016, 2017, 2018, and 2019. Denies vaginal bleeding, loss of fluid and pelvic pain. Positive fetal movement. Admits to  some headaches. Denies vision changes and RUQ pain  Admits to fatigue, nausea with headache and constipation. Cough noted at last visit has improved but is not totally gone. Now plagued by constant nausea and vomiting. Zofran has not been helping--took 2 last evening. Has been able to hydrate. Has noted that nausea and vomiting habitually occurs between 11:00 and 11:30 PM.    Admits to baseline shortness of breath and constipation. Denies fever, chills, chest pain, diarrhea, dysuria and hematuria. Maternal Wellness Questionnaire reviewed--no concerns. OB ULTRASOUND:  BIOPHYSICAL PROFILE    DATE: 22    PHYSICIAN: JARETH Liu D.O.    SONOGRAPHER: MARY Dutta RDMS    INDICATION: Fetal well being    TYPE OF SCAN: abdominal    BPP: 8/8  Tone: 2, Gross fetal movement: 2, Breathin, Fluid: 2    FINDINGS:  A single viable intrauterine pregnancy is noted in cephalic presentation. Cardiac and somatic activity are noted. The following values were obtained:   Fetal heart rate 148bpm   Amniotic fluid index 15.75cm    IMPRESSION:   Single live IUP in the third trimester. BPP 8/8. KRYSTINA 15.75cm. There is no sonographic evidence of a MCI on today's ultrasound.   The PCI is measuring 3.32 cm from the placental edge. Imaging is limited secondary to fetal position. The patient is well aware of the limitations of ultrasound in the detection of anomalies. Diagnosis Orders   1. Prenatal care, subsequent pregnancy in third trimester        2. Obesity in pregnancy, antepartum        3. Marginal insertion of umbilical cord affecting management of mother        4. History of cervical LEEP biopsy affecting care of mother, antepartum        5. History of cervical dysplasia        6. History of postpartum depression          Rx phenergan  Rx reglan (hold medication--to be taken if no improvement with phenergan). Patient encouraged to hydrate. Patient to report to L&D if no improvement with phenergan, hydration and rest.   Follow up prn and 1 week for prenatal visit and fetal monitoring.

## 2022-11-14 LAB — GBS, EXTERNAL RESULT: NEGATIVE

## 2022-11-15 ENCOUNTER — ROUTINE PRENATAL (OUTPATIENT)
Dept: OBGYN CLINIC | Age: 32
End: 2022-11-15

## 2022-11-15 ENCOUNTER — OFFICE VISIT (OUTPATIENT)
Dept: OBGYN CLINIC | Age: 32
End: 2022-11-15
Payer: COMMERCIAL

## 2022-11-15 VITALS
SYSTOLIC BLOOD PRESSURE: 106 MMHG | WEIGHT: 230 LBS | DIASTOLIC BLOOD PRESSURE: 70 MMHG | BODY MASS INDEX: 39.48 KG/M2 | HEART RATE: 92 BPM

## 2022-11-15 DIAGNOSIS — O43.199 MARGINAL INSERTION OF UMBILICAL CORD AFFECTING MANAGEMENT OF MOTHER: ICD-10-CM

## 2022-11-15 DIAGNOSIS — O34.40 HISTORY OF CERVICAL LEEP BIOPSY AFFECTING CARE OF MOTHER, ANTEPARTUM: ICD-10-CM

## 2022-11-15 DIAGNOSIS — Z98.890 HISTORY OF CERVICAL LEEP BIOPSY AFFECTING CARE OF MOTHER, ANTEPARTUM: ICD-10-CM

## 2022-11-15 DIAGNOSIS — Z87.59 HISTORY OF POSTPARTUM DEPRESSION: ICD-10-CM

## 2022-11-15 DIAGNOSIS — Z34.83 PRENATAL CARE, SUBSEQUENT PREGNANCY IN THIRD TRIMESTER: Primary | ICD-10-CM

## 2022-11-15 DIAGNOSIS — O99.210 MATERNAL OBESITY AFFECTING PREGNANCY, ANTEPARTUM: ICD-10-CM

## 2022-11-15 DIAGNOSIS — Z86.59 HISTORY OF POSTPARTUM DEPRESSION: ICD-10-CM

## 2022-11-15 PROCEDURE — 0502F SUBSEQUENT PRENATAL CARE: CPT | Performed by: OBSTETRICS & GYNECOLOGY

## 2022-11-15 PROCEDURE — 76819 FETAL BIOPHYS PROFIL W/O NST: CPT | Performed by: OBSTETRICS & GYNECOLOGY

## 2022-11-15 RX ORDER — ONDANSETRON 8 MG/1
8 TABLET, ORALLY DISINTEGRATING ORAL EVERY 8 HOURS PRN
Qty: 30 TABLET | Refills: 0 | Status: SHIPPED | OUTPATIENT
Start: 2022-11-15

## 2022-11-15 NOTE — PROGRESS NOTES
TEMP 97.3 F  INFARED     Maternal emotional well being screening form completed and reviewed with patient. Current score is 10. Patient given referral to [facility] ([phone]):  No

## 2022-11-27 NOTE — PROGRESS NOTES
35 y/o  female at 35 weeks 1 day gestation with [facility] 22 presents for prenatal visit. Pregnancy is complicated by marginal cord insertion (serial growth scans every 4 weeks), maternal weight, anxiety, first trimester UTI (treated with Keflex), and history of LEEP procedure. FDLMP: 3/20/22. Patient is 2 years s/p . Previous pregnancy was complicated by GBS positive, obesity, anxiety, and history of LEEP procedure. 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 Had normal pap smear in , , , 2016, 2017, 2018, and 2019. Denies vaginal bleeding, loss of fluid and pelvic pain. Positive fetal movement. Experienced one contraction on Saturday lasting 3-5 seconds. Admits to  some headaches. Denies vision changes and RUQ pain  Admits to fatigue   Nausea has been persistent, no vomiting. Has been able to hydrate. Cough has improved. Admits to baseline shortness of breath and constipation. Denies fever, chills, chest pain, diarrhea, dysuria and hematuria. Maternal Wellness Questionnaire reviewed--no concerns. OB ULTRASOUND:  BIOPHYSICAL PROFILE    DATE: 11/15/2022    PHYSICIAN: JARETH Liu D.O.    SONOGRAPHER: David Maher RDMS    INDICATION: Fetal well being    TYPE OF SCAN: abdominal    BPP: 8/8  Tone: 2, Gross fetal movement: 2, Breathin, Fluid: 2    FINDINGS:  A single viable intrauterine pregnancy is noted in cephalic presentation. Cardiac and somatic activity are noted. The following values were obtained:   Fetal heart rate 136 bpm   Amniotic fluid index 8.89cm    IMPRESSION:   Single live IUP in the third trimester. BPP 8/8. KRYSTINA 8.89cm. Imaging is limited secondary to fetal position. The patient is well aware of the limitations of ultrasound in the detection of anomalies. Diagnosis Orders   1. Prenatal care, subsequent pregnancy in third trimester        2.  Marginal insertion of umbilical cord affecting management of mother        3. Maternal obesity affecting pregnancy, antepartum        4. History of cervical LEEP biopsy affecting care of mother, antepartum        5. History of postpartum depression          Rx refill zofran  Labor precautions, kick counts  Follow up prn and 1 week for prenatal visit and fetal monitoring.

## 2022-11-29 ENCOUNTER — OFFICE VISIT (OUTPATIENT)
Dept: OBGYN CLINIC | Age: 32
End: 2022-11-29
Payer: COMMERCIAL

## 2022-11-29 ENCOUNTER — ROUTINE PRENATAL (OUTPATIENT)
Dept: OBGYN CLINIC | Age: 32
End: 2022-11-29

## 2022-11-29 VITALS
WEIGHT: 231.8 LBS | BODY MASS INDEX: 39.79 KG/M2 | DIASTOLIC BLOOD PRESSURE: 84 MMHG | SYSTOLIC BLOOD PRESSURE: 118 MMHG | HEART RATE: 103 BPM

## 2022-11-29 DIAGNOSIS — Z86.59 HISTORY OF POSTPARTUM DEPRESSION: ICD-10-CM

## 2022-11-29 DIAGNOSIS — O34.40 HISTORY OF CERVICAL LEEP BIOPSY AFFECTING CARE OF MOTHER, ANTEPARTUM: ICD-10-CM

## 2022-11-29 DIAGNOSIS — Z87.59 HISTORY OF POSTPARTUM DEPRESSION: ICD-10-CM

## 2022-11-29 DIAGNOSIS — Z36.89 ENCOUNTER FOR ULTRASOUND TO CHECK FETAL GROWTH: ICD-10-CM

## 2022-11-29 DIAGNOSIS — O99.210 OBESITY IN PREGNANCY, ANTEPARTUM: ICD-10-CM

## 2022-11-29 DIAGNOSIS — Z34.83 PRENATAL CARE, SUBSEQUENT PREGNANCY IN THIRD TRIMESTER: Primary | ICD-10-CM

## 2022-11-29 DIAGNOSIS — Z98.890 HISTORY OF CERVICAL LEEP BIOPSY AFFECTING CARE OF MOTHER, ANTEPARTUM: ICD-10-CM

## 2022-11-29 DIAGNOSIS — O43.199 MARGINAL INSERTION OF UMBILICAL CORD AFFECTING MANAGEMENT OF MOTHER: ICD-10-CM

## 2022-11-29 PROCEDURE — 76816 OB US FOLLOW-UP PER FETUS: CPT | Performed by: OBSTETRICS & GYNECOLOGY

## 2022-11-29 RX ORDER — ACETAMINOPHEN 500 MG
500 TABLET ORAL EVERY 6 HOURS PRN
COMMUNITY

## 2022-11-29 NOTE — PROGRESS NOTES
Maternal emotional well being screening form completed and reviewed with patient. Current score is 14.    Patient given referral to [address] [place] [address] ([phone]): Yes

## 2022-11-30 NOTE — PROGRESS NOTES
33 y/o  female at 37 weeks 1 day gestation with [facility] 22 presents for prenatal visit and ultrasound    Pregnancy is complicated by marginal cord insertion (serial growth scans every 4 weeks), maternal weight, anxiety, first trimester UTI (treated with Keflex), and history of LEEP procedure. FDLMP: 3/20/22. Patient is 2 years s/p . Previous pregnancy was complicated by GBS positive, obesity, anxiety, and history of LEEP procedure. 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 Had normal pap smear in , , 2015, 2016, 2017, 2018, and 2019. Denies vaginal bleeding, loss of fluid and pelvic pain. Positive fetal movement. Admits to  some headaches. Denies vision changes and RUQ pain  Admits to fatigue   Nausea has been persistent, no vomiting. Has been able to hydrate. Admits to baseline shortness of breath and constipation. Denies fever, chills, chest pain, diarrhea, dysuria and hematuria  Maternal Wellness Questionnaire reviewed--no concerns   vertex      OBSTETRIC ULTRASOUND GROWTH    DATE: 22    PHYSICIAN: JARETH Liu D.O.     SONOGRAPHER: MARY Dutta RDMS    INDICATION: Growth, BPP, MCI    TYPE OF SCAN: abdominal    FINDINGS:  A single viable intrauterine pregnancy is noted in cephalic presentation. Cardiac and somatic activity are noted. The following values were obtained:   Fetal heart rate    148 bpm   BPD      8.86cm   29.4 %   Head Circumference    33.34cm  47.4 %    Abdominal Circumference   33.12cm  61.4 %   Femur Length     7.18cm   39.6 %   Amniotic fluid index    11.68cm   EFW      3074g   51.6 percentile    Amniotic fluid volume is normal. Based on sonographic criteria the estimated fetal age is 37 weeks and 1 day with EDC of 22. The patient has a right lateral placenta that is adequate distance in relation to the internal cervical os.  The evaluation of the lower uterine segment and cervix reveals normal appearing anatomy. The uterus is unremarkable/gravid. Maternal ovaries and adnexae are not well visualized due to the size of the uterus and patient's gravid state. Anatomy seen includes: heart, stomach, kidneys, bladder    IMPRESSION:  Single live IUP in the third trimester. Adequate interval fetal growth. BPP 8/8. Imaging is limited secondary to fetal position. The patient is well aware of the limitations of ultrasound in the detection of anomalies. Diagnosis Orders   1. Prenatal care, subsequent pregnancy in third trimester  Culture, Strep B Screen, Vaginal/Rectal      2. Obesity in pregnancy, antepartum        3. Marginal insertion of umbilical cord affecting management of mother        4. History of cervical LEEP biopsy affecting care of mother, antepartum        5. History of postpartum depression          Orders Placed This Encounter   Procedures    Culture, Strep B Screen, Vaginal/Rectal     Ultrasound result reviewed.    Labor precautions, kick counts  Follow up prn and 1 week for prenatal visit

## 2022-12-02 LAB — GROUP B STREP CULTURE: NORMAL

## 2022-12-07 ENCOUNTER — ROUTINE PRENATAL (OUTPATIENT)
Dept: OBGYN CLINIC | Age: 32
End: 2022-12-07

## 2022-12-07 VITALS
SYSTOLIC BLOOD PRESSURE: 120 MMHG | DIASTOLIC BLOOD PRESSURE: 76 MMHG | WEIGHT: 228.2 LBS | BODY MASS INDEX: 39.17 KG/M2 | HEART RATE: 116 BPM

## 2022-12-07 DIAGNOSIS — Z98.890 HISTORY OF CERVICAL LEEP BIOPSY AFFECTING CARE OF MOTHER, ANTEPARTUM: ICD-10-CM

## 2022-12-07 DIAGNOSIS — O99.210 MATERNAL OBESITY AFFECTING PREGNANCY, ANTEPARTUM: ICD-10-CM

## 2022-12-07 DIAGNOSIS — O34.40 HISTORY OF CERVICAL LEEP BIOPSY AFFECTING CARE OF MOTHER, ANTEPARTUM: ICD-10-CM

## 2022-12-07 DIAGNOSIS — Z86.59 HISTORY OF POSTPARTUM DEPRESSION: ICD-10-CM

## 2022-12-07 DIAGNOSIS — Z34.83 PRENATAL CARE, SUBSEQUENT PREGNANCY IN THIRD TRIMESTER: Primary | ICD-10-CM

## 2022-12-07 DIAGNOSIS — O99.210 OBESITY IN PREGNANCY, ANTEPARTUM: ICD-10-CM

## 2022-12-07 DIAGNOSIS — Z87.410 HISTORY OF CERVICAL DYSPLASIA: ICD-10-CM

## 2022-12-07 DIAGNOSIS — O43.199 MARGINAL INSERTION OF UMBILICAL CORD AFFECTING MANAGEMENT OF MOTHER: ICD-10-CM

## 2022-12-07 DIAGNOSIS — Z87.59 HISTORY OF POSTPARTUM DEPRESSION: ICD-10-CM

## 2022-12-07 PROCEDURE — 0502F SUBSEQUENT PRENATAL CARE: CPT | Performed by: OBSTETRICS & GYNECOLOGY

## 2022-12-07 RX ORDER — PROMETHAZINE HYDROCHLORIDE 25 MG/1
25 TABLET ORAL 3 TIMES DAILY PRN
Qty: 20 TABLET | Refills: 0 | Status: SHIPPED | OUTPATIENT
Start: 2022-12-07 | End: 2022-12-14

## 2022-12-07 NOTE — PROGRESS NOTES
Maternal emotional well being screening form completed and reviewed with patient. Current score is 11.

## 2022-12-08 ENCOUNTER — ANESTHESIA (OUTPATIENT)
Dept: LABOR AND DELIVERY | Age: 32
End: 2022-12-08
Payer: COMMERCIAL

## 2022-12-08 ENCOUNTER — ANESTHESIA EVENT (OUTPATIENT)
Dept: LABOR AND DELIVERY | Age: 32
End: 2022-12-08
Payer: COMMERCIAL

## 2022-12-08 ENCOUNTER — HOSPITAL ENCOUNTER (INPATIENT)
Age: 32
LOS: 2 days | Discharge: HOME | End: 2022-12-10
Attending: OBSTETRICS & GYNECOLOGY | Admitting: OBSTETRICS & GYNECOLOGY
Payer: COMMERCIAL

## 2022-12-08 PROBLEM — Z37.9 NORMAL LABOR: Status: ACTIVE | Noted: 2022-12-08

## 2022-12-08 LAB
ABO/RH: NORMAL
AMPHETAMINE SCREEN, URINE: NORMAL
ANTIBODY IDENTIFICATION: NORMAL
ANTIBODY SCREEN: NORMAL
BARBITURATE SCREEN URINE: NORMAL
BASOPHILS ABSOLUTE: 0.1 K/UL (ref 0–0.2)
BASOPHILS RELATIVE PERCENT: 1 %
BENZODIAZEPINE SCREEN, URINE: NORMAL
BLOOD BANK DISPENSE STATUS: NORMAL
BLOOD BANK DISPENSE STATUS: NORMAL
BLOOD BANK PRODUCT CODE: NORMAL
BLOOD BANK PRODUCT CODE: NORMAL
BPU ID: NORMAL
BPU ID: NORMAL
BUPRENORPHINE URINE: NORMAL
CANNABINOID SCREEN URINE: NORMAL
COCAINE METABOLITE SCREEN URINE: NORMAL
DESCRIPTION BLOOD BANK: NORMAL
DESCRIPTION BLOOD BANK: NORMAL
EOSINOPHILS ABSOLUTE: 0.2 K/UL (ref 0–0.6)
EOSINOPHILS RELATIVE PERCENT: 1.5 %
FENTANYL SCREEN, URINE: NORMAL
HCT VFR BLD CALC: 41.4 % (ref 36–48)
HEMOGLOBIN: 13.9 G/DL (ref 12–16)
LYMPHOCYTES ABSOLUTE: 2.9 K/UL (ref 1–5.1)
LYMPHOCYTES RELATIVE PERCENT: 27.3 %
Lab: NORMAL
MCH RBC QN AUTO: 30.1 PG (ref 26–34)
MCHC RBC AUTO-ENTMCNC: 33.6 G/DL (ref 31–36)
MCV RBC AUTO: 89.5 FL (ref 80–100)
METHADONE SCREEN, URINE: NORMAL
MONOCYTES ABSOLUTE: 0.9 K/UL (ref 0–1.3)
MONOCYTES RELATIVE PERCENT: 8.3 %
NEUTROPHILS ABSOLUTE: 6.6 K/UL (ref 1.7–7.7)
NEUTROPHILS RELATIVE PERCENT: 61.9 %
OPIATE SCREEN URINE: NORMAL
OXYCODONE URINE: NORMAL
PDW BLD-RTO: 13 % (ref 12.4–15.4)
PH UA: 6
PHENCYCLIDINE SCREEN URINE: NORMAL
PLATELET # BLD: 260 K/UL (ref 135–450)
PMV BLD AUTO: 9.3 FL (ref 5–10.5)
RBC # BLD: 4.62 M/UL (ref 4–5.2)
WBC # BLD: 10.7 K/UL (ref 4–11)

## 2022-12-08 PROCEDURE — 86922 COMPATIBILITY TEST ANTIGLOB: CPT

## 2022-12-08 PROCEDURE — 85025 COMPLETE CBC W/AUTO DIFF WBC: CPT

## 2022-12-08 PROCEDURE — 86870 RBC ANTIBODY IDENTIFICATION: CPT

## 2022-12-08 PROCEDURE — 2580000003 HC RX 258: Performed by: OBSTETRICS & GYNECOLOGY

## 2022-12-08 PROCEDURE — 10907ZC DRAINAGE OF AMNIOTIC FLUID, THERAPEUTIC FROM PRODUCTS OF CONCEPTION, VIA NATURAL OR ARTIFICIAL OPENING: ICD-10-PCS | Performed by: OBSTETRICS & GYNECOLOGY

## 2022-12-08 PROCEDURE — 2500000003 HC RX 250 WO HCPCS: Performed by: NURSE ANESTHETIST, CERTIFIED REGISTERED

## 2022-12-08 PROCEDURE — 36415 COLL VENOUS BLD VENIPUNCTURE: CPT

## 2022-12-08 PROCEDURE — 1220000000 HC SEMI PRIVATE OB R&B

## 2022-12-08 PROCEDURE — 7200000001 HC VAGINAL DELIVERY

## 2022-12-08 PROCEDURE — 80307 DRUG TEST PRSMV CHEM ANLYZR: CPT

## 2022-12-08 PROCEDURE — 6360000002 HC RX W HCPCS

## 2022-12-08 PROCEDURE — 86850 RBC ANTIBODY SCREEN: CPT

## 2022-12-08 PROCEDURE — 99222 1ST HOSP IP/OBS MODERATE 55: CPT | Performed by: OBSTETRICS & GYNECOLOGY

## 2022-12-08 PROCEDURE — 3700000025 EPIDURAL BLOCK: Performed by: ANESTHESIOLOGY

## 2022-12-08 PROCEDURE — 59400 OBSTETRICAL CARE: CPT | Performed by: OBSTETRICS & GYNECOLOGY

## 2022-12-08 PROCEDURE — 86880 COOMBS TEST DIRECT: CPT

## 2022-12-08 PROCEDURE — 0HQ9XZZ REPAIR PERINEUM SKIN, EXTERNAL APPROACH: ICD-10-PCS | Performed by: OBSTETRICS & GYNECOLOGY

## 2022-12-08 PROCEDURE — 86901 BLOOD TYPING SEROLOGIC RH(D): CPT

## 2022-12-08 PROCEDURE — 51701 INSERT BLADDER CATHETER: CPT

## 2022-12-08 PROCEDURE — 86780 TREPONEMA PALLIDUM: CPT

## 2022-12-08 PROCEDURE — 86900 BLOOD TYPING SEROLOGIC ABO: CPT

## 2022-12-08 PROCEDURE — 6360000002 HC RX W HCPCS: Performed by: OBSTETRICS & GYNECOLOGY

## 2022-12-08 RX ORDER — SODIUM CHLORIDE 0.9 % (FLUSH) 0.9 %
5-40 SYRINGE (ML) INJECTION PRN
Status: DISCONTINUED | OUTPATIENT
Start: 2022-12-08 | End: 2022-12-09

## 2022-12-08 RX ORDER — SODIUM CHLORIDE, SODIUM LACTATE, POTASSIUM CHLORIDE, AND CALCIUM CHLORIDE .6; .31; .03; .02 G/100ML; G/100ML; G/100ML; G/100ML
1000 INJECTION, SOLUTION INTRAVENOUS PRN
Status: DISCONTINUED | OUTPATIENT
Start: 2022-12-08 | End: 2022-12-09

## 2022-12-08 RX ORDER — BUPIVACAINE HYDROCHLORIDE 5 MG/ML
INJECTION, SOLUTION EPIDURAL; INTRACAUDAL PRN
Status: DISCONTINUED | OUTPATIENT
Start: 2022-12-08 | End: 2022-12-08 | Stop reason: SDUPTHER

## 2022-12-08 RX ORDER — SODIUM CHLORIDE 0.9 % (FLUSH) 0.9 %
5-40 SYRINGE (ML) INJECTION EVERY 12 HOURS SCHEDULED
Status: DISCONTINUED | OUTPATIENT
Start: 2022-12-08 | End: 2022-12-09

## 2022-12-08 RX ORDER — DIPHENHYDRAMINE HYDROCHLORIDE 50 MG/ML
25 INJECTION INTRAMUSCULAR; INTRAVENOUS EVERY 4 HOURS PRN
Status: DISCONTINUED | OUTPATIENT
Start: 2022-12-08 | End: 2022-12-09

## 2022-12-08 RX ORDER — ACETAMINOPHEN 325 MG/1
650 TABLET ORAL EVERY 4 HOURS PRN
Status: DISCONTINUED | OUTPATIENT
Start: 2022-12-08 | End: 2022-12-09

## 2022-12-08 RX ORDER — SODIUM CHLORIDE, SODIUM LACTATE, POTASSIUM CHLORIDE, CALCIUM CHLORIDE 600; 310; 30; 20 MG/100ML; MG/100ML; MG/100ML; MG/100ML
INJECTION, SOLUTION INTRAVENOUS CONTINUOUS
Status: DISCONTINUED | OUTPATIENT
Start: 2022-12-08 | End: 2022-12-09

## 2022-12-08 RX ORDER — DOCUSATE SODIUM 100 MG/1
100 CAPSULE, LIQUID FILLED ORAL 2 TIMES DAILY
Status: DISCONTINUED | OUTPATIENT
Start: 2022-12-08 | End: 2022-12-10 | Stop reason: HOSPADM

## 2022-12-08 RX ORDER — ONDANSETRON 2 MG/ML
4 INJECTION INTRAMUSCULAR; INTRAVENOUS EVERY 6 HOURS PRN
Status: DISCONTINUED | OUTPATIENT
Start: 2022-12-08 | End: 2022-12-09

## 2022-12-08 RX ORDER — SODIUM CHLORIDE 9 MG/ML
25 INJECTION, SOLUTION INTRAVENOUS PRN
Status: DISCONTINUED | OUTPATIENT
Start: 2022-12-08 | End: 2022-12-09

## 2022-12-08 RX ORDER — SODIUM CHLORIDE, SODIUM LACTATE, POTASSIUM CHLORIDE, AND CALCIUM CHLORIDE .6; .31; .03; .02 G/100ML; G/100ML; G/100ML; G/100ML
500 INJECTION, SOLUTION INTRAVENOUS PRN
Status: DISCONTINUED | OUTPATIENT
Start: 2022-12-08 | End: 2022-12-09

## 2022-12-08 RX ADMIN — ONDANSETRON 4 MG: 2 INJECTION INTRAMUSCULAR; INTRAVENOUS at 20:02

## 2022-12-08 RX ADMIN — Medication 87.3 MILLI-UNITS/MIN: at 21:57

## 2022-12-08 RX ADMIN — SODIUM CHLORIDE, POTASSIUM CHLORIDE, SODIUM LACTATE AND CALCIUM CHLORIDE 1000 ML: 600; 310; 30; 20 INJECTION, SOLUTION INTRAVENOUS at 18:49

## 2022-12-08 RX ADMIN — SODIUM CHLORIDE, POTASSIUM CHLORIDE, SODIUM LACTATE AND CALCIUM CHLORIDE: 600; 310; 30; 20 INJECTION, SOLUTION INTRAVENOUS at 20:06

## 2022-12-08 RX ADMIN — SODIUM CHLORIDE, POTASSIUM CHLORIDE, SODIUM LACTATE AND CALCIUM CHLORIDE: 600; 310; 30; 20 INJECTION, SOLUTION INTRAVENOUS at 21:04

## 2022-12-08 RX ADMIN — BUPIVACAINE HYDROCHLORIDE 1.5 ML: 5 INJECTION, SOLUTION EPIDURAL; INTRACAUDAL; PERINEURAL at 19:31

## 2022-12-08 RX ADMIN — Medication 15 ML/HR: at 20:09

## 2022-12-08 NOTE — PROGRESS NOTES
1836- Pt to 379, Dr. Kellie Cohen at bedside  5473- SVE by Dr. Kellie Cohen; cervix thin, unable to obtain dilitation d/t pt discomfort. Pt declining EFM at this time; she does not want anything on her abdomen  1844- PIV attempt  1849- PIV placed; labs drawn; LR bolus started. Contractions every 2-3min, pt coping well and breathing- requesting epidural when able. [facility] given to ANDREA Jean

## 2022-12-09 LAB
ABO/RH: NORMAL
ANTIBODY IDENTIFICATION: NORMAL
ANTIBODY SCREEN: NORMAL
TOTAL SYPHILLIS IGG/IGM: NORMAL

## 2022-12-09 PROCEDURE — 1220000000 HC SEMI PRIVATE OB R&B

## 2022-12-09 PROCEDURE — 6370000000 HC RX 637 (ALT 250 FOR IP): Performed by: OBSTETRICS & GYNECOLOGY

## 2022-12-09 PROCEDURE — 99024 POSTOP FOLLOW-UP VISIT: CPT | Performed by: OBSTETRICS & GYNECOLOGY

## 2022-12-09 RX ORDER — ACETAMINOPHEN 500 MG
1000 TABLET ORAL EVERY 8 HOURS PRN
Status: DISCONTINUED | OUTPATIENT
Start: 2022-12-09 | End: 2022-12-10 | Stop reason: HOSPADM

## 2022-12-09 RX ORDER — ONDANSETRON 2 MG/ML
4 INJECTION INTRAMUSCULAR; INTRAVENOUS EVERY 6 HOURS PRN
Status: DISCONTINUED | OUTPATIENT
Start: 2022-12-09 | End: 2022-12-10 | Stop reason: HOSPADM

## 2022-12-09 RX ORDER — SODIUM CHLORIDE 0.9 % (FLUSH) 0.9 %
5-40 SYRINGE (ML) INJECTION PRN
Status: DISCONTINUED | OUTPATIENT
Start: 2022-12-09 | End: 2022-12-10 | Stop reason: HOSPADM

## 2022-12-09 RX ORDER — FERROUS SULFATE 325(65) MG
325 TABLET ORAL
Status: DISCONTINUED | OUTPATIENT
Start: 2022-12-09 | End: 2022-12-10 | Stop reason: HOSPADM

## 2022-12-09 RX ORDER — DOCUSATE SODIUM 100 MG/1
100 CAPSULE, LIQUID FILLED ORAL 2 TIMES DAILY
Status: DISCONTINUED | OUTPATIENT
Start: 2022-12-09 | End: 2022-12-10 | Stop reason: HOSPADM

## 2022-12-09 RX ORDER — IBUPROFEN 800 MG/1
800 TABLET ORAL EVERY 8 HOURS PRN
Status: DISCONTINUED | OUTPATIENT
Start: 2022-12-09 | End: 2022-12-10 | Stop reason: HOSPADM

## 2022-12-09 RX ORDER — SIMETHICONE 80 MG
80 TABLET,CHEWABLE ORAL EVERY 6 HOURS PRN
Status: DISCONTINUED | OUTPATIENT
Start: 2022-12-09 | End: 2022-12-10 | Stop reason: HOSPADM

## 2022-12-09 RX ORDER — FAMOTIDINE 20 MG/1
20 TABLET, FILM COATED ORAL 2 TIMES DAILY PRN
Status: DISCONTINUED | OUTPATIENT
Start: 2022-12-09 | End: 2022-12-10 | Stop reason: HOSPADM

## 2022-12-09 RX ORDER — LANOLIN 100 %
OINTMENT (GRAM) TOPICAL PRN
Status: DISCONTINUED | OUTPATIENT
Start: 2022-12-09 | End: 2022-12-10 | Stop reason: HOSPADM

## 2022-12-09 RX ORDER — SODIUM CHLORIDE 0.9 % (FLUSH) 0.9 %
5-40 SYRINGE (ML) INJECTION EVERY 12 HOURS SCHEDULED
Status: DISCONTINUED | OUTPATIENT
Start: 2022-12-09 | End: 2022-12-10 | Stop reason: HOSPADM

## 2022-12-09 RX ORDER — SODIUM CHLORIDE, SODIUM LACTATE, POTASSIUM CHLORIDE, CALCIUM CHLORIDE 600; 310; 30; 20 MG/100ML; MG/100ML; MG/100ML; MG/100ML
INJECTION, SOLUTION INTRAVENOUS CONTINUOUS
Status: DISCONTINUED | OUTPATIENT
Start: 2022-12-09 | End: 2022-12-10 | Stop reason: HOSPADM

## 2022-12-09 RX ORDER — SODIUM CHLORIDE 9 MG/ML
INJECTION, SOLUTION INTRAVENOUS PRN
Status: DISCONTINUED | OUTPATIENT
Start: 2022-12-09 | End: 2022-12-10 | Stop reason: HOSPADM

## 2022-12-09 RX ADMIN — WITCH HAZEL: 500 SOLUTION RECTAL; TOPICAL at 09:21

## 2022-12-09 RX ADMIN — IBUPROFEN 800 MG: 800 TABLET, FILM COATED ORAL at 17:23

## 2022-12-09 RX ADMIN — DOCUSATE SODIUM 100 MG: 100 CAPSULE, LIQUID FILLED ORAL at 21:33

## 2022-12-09 RX ADMIN — ACETAMINOPHEN 1000 MG: 500 TABLET ORAL at 02:49

## 2022-12-09 RX ADMIN — BENZOCAINE AND LEVOMENTHOL: 200; 5 SPRAY TOPICAL at 09:21

## 2022-12-09 RX ADMIN — HYDROCORTISONE: 25 CREAM TOPICAL at 09:22

## 2022-12-09 RX ADMIN — IBUPROFEN 800 MG: 800 TABLET, FILM COATED ORAL at 09:22

## 2022-12-09 RX ADMIN — ACETAMINOPHEN 1000 MG: 500 TABLET ORAL at 21:34

## 2022-12-09 RX ADMIN — DOCUSATE SODIUM 100 MG: 100 CAPSULE, LIQUID FILLED ORAL at 09:22

## 2022-12-09 RX ADMIN — ACETAMINOPHEN 1000 MG: 500 TABLET ORAL at 12:49

## 2022-12-09 RX ADMIN — IBUPROFEN 800 MG: 800 TABLET, FILM COATED ORAL at 00:51

## 2022-12-09 ASSESSMENT — PAIN DESCRIPTION - DESCRIPTORS
DESCRIPTORS: DISCOMFORT;CRAMPING
DESCRIPTORS: ACHING
DESCRIPTORS: ACHING;DULL
DESCRIPTORS: CRAMPING
DESCRIPTORS: ACHING

## 2022-12-09 ASSESSMENT — PAIN - FUNCTIONAL ASSESSMENT
PAIN_FUNCTIONAL_ASSESSMENT: ACTIVITIES ARE NOT PREVENTED

## 2022-12-09 ASSESSMENT — PAIN DESCRIPTION - ORIENTATION
ORIENTATION: LOWER
ORIENTATION: LOWER

## 2022-12-09 ASSESSMENT — PAIN DESCRIPTION - LOCATION
LOCATION: ABDOMEN
LOCATION: ABDOMEN;PERINEUM
LOCATION: BACK
LOCATION: ABDOMEN
LOCATION: ABDOMEN;PERINEUM
LOCATION: BACK

## 2022-12-09 ASSESSMENT — PAIN SCALES - GENERAL
PAINLEVEL_OUTOF10: 3
PAINLEVEL_OUTOF10: 4
PAINLEVEL_OUTOF10: 2
PAINLEVEL_OUTOF10: 3

## 2022-12-09 NOTE — PLAN OF CARE
Problem: Pain  Goal: Verbalizes/displays adequate comfort level or baseline comfort level  12/9/2022 1155 by Mari Mcgraw RN  Outcome: Progressing  Flowsheets  Taken 12/9/2022 0926 by Mari Mcgraw RN  Verbalizes/displays adequate comfort level or baseline comfort level:   Encourage patient to monitor pain and request assistance   Assess pain using appropriate pain scale   Administer analgesics based on type and severity of pain and evaluate response   Implement non-pharmacological measures as appropriate and evaluate response   Consider cultural and social influences on pain and pain management   Notify Licensed Independent Practitioner if interventions unsuccessful or patient reports new pain  Taken 12/9/2022 0429 by Mukund Butt  Verbalizes/displays adequate comfort level or baseline comfort level:   Encourage patient to monitor pain and request assistance   Assess pain using appropriate pain scale   Administer analgesics based on type and severity of pain and evaluate response   Implement non-pharmacological measures as appropriate and evaluate response   Consider cultural and social influences on pain and pain management   Notify Licensed Independent Practitioner if interventions unsuccessful or patient reports new pain  12/9/2022 0233 by Mukund Butt  Outcome: Progressing  Flowsheets (Taken 12/9/2022 0000)  Verbalizes/displays adequate comfort level or baseline comfort level:   Encourage patient to monitor pain and request assistance   Assess pain using appropriate pain scale   Administer analgesics based on type and severity of pain and evaluate response   Implement non-pharmacological measures as appropriate and evaluate response   Consider cultural and social influences on pain and pain management   Notify Licensed Independent Practitioner if interventions unsuccessful or patient reports new pain     Problem: Postpartum  Goal: Experiences normal postpartum course  Description: Postpartum OB-Pregnancy care plan goal which identifies if the mother is experiencing a normal postpartum course  2022 1155 by Celia Sutton RN  Outcome: Progressing  2022 0233 by Crystal Dennison  Outcome: Progressing  Goal: Appropriate maternal -  bonding  Description:  Postpartum OB-Pregnancy care plan goal which identifies if the mother and  are bonding appropriately  2022 1155 by Celia Sutton RN  Outcome: Progressing  2022 0233 by Crystal Dennison  Outcome: Progressing  Goal: Establishment of infant feeding pattern  Description:  Postpartum OB-Pregnancy care plan goal which identifies if the mother is establishing a feeding pattern with their   2022 1155 by Celia Sutton RN  Outcome: Progressing  2022 0233 by Crystal Dennison  Outcome: Progressing  Goal: Incisions, wounds, or drain sites healing without S/S of infection  2022 1155 by Celia Suttno RN  Outcome: Progressing  Flowsheets  Taken 2022 0926 by Celia Sutton RN  Incisions, Wounds, or Drain Sites Healing Without Sign and Symptoms of Infection:   ADMISSION and DAILY: Assess and document risk factors for pressure ulcer development   TWICE DAILY: Assess and document skin integrity   TWICE DAILY: Assess and document dressing/incision, wound bed, drain sites and surrounding tissue  Taken 2022 0429 by Crystal Dennison  Incisions, Wounds, or Drain Sites Healing Without Sign and Symptoms of Infection:   ADMISSION and DAILY: Assess and document risk factors for pressure ulcer development   TWICE DAILY: Assess and document skin integrity  2022 0233 by Crystal Dennison  Outcome: Progressing     Problem: Infection - Adult  Goal: Absence of infection at discharge  2022 1155 by Celia Sutton RN  Outcome: Progressing  2022 0233 by Crystal Dennison  Outcome: Progressing  Goal: Absence of infection during hospitalization  2022 1155 by Janeth Luong RN  Outcome: Progressing  12/9/2022 0233 by Rossi Pierre  Outcome: Progressing  Goal: Absence of fever/infection during anticipated neutropenic period  12/9/2022 1155 by Janeth Luong RN  Outcome: Progressing  12/9/2022 0233 by Rossi Pierre  Outcome: Progressing     Problem: Safety - Adult  Goal: Free from fall injury  12/9/2022 1155 by Janeth Luong RN  Outcome: Progressing  12/9/2022 0233 by Rossi Pierre  Outcome: Progressing     Problem: Discharge Planning  Goal: Discharge to home or other facility with appropriate resources  12/9/2022 1155 by Janeth Luong RN  Outcome: Progressing  12/9/2022 0233 by Rossi Pierre  Outcome: Progressing     Problem: Chronic Conditions and Co-morbidities  Goal: Patient's chronic conditions and co-morbidity symptoms are monitored and maintained or improved  12/9/2022 1155 by Janeth Luong RN  Outcome: Progressing  12/9/2022 0233 by Rossi Pierre  Outcome: Progressing

## 2022-12-09 NOTE — L&D DELIVERY SUMMARY NOTE
Department of Obstetrics and Gynecology  Spontaneous Vaginal Delivery Note      Pre-operative Diagnosis:  Term pregnancy, Spontaneous labor, Single fetus, and Pregnancy complicated by: marginal cord insertion, maternal weight, anxiety, first trimester UTI, and history of LEEP procedure  Positive antibody little C  Post-operative Diagnosis:  Living  infant(s) and Female    Information for the patient's :  Agnieszka Flowers [[id number]]                  Infant Wt:   Information for the patient's :  Agnieszka Segura [[id number]]         APGARS:     Information for the patient's :  Agnieszka Segura [[id number]]         Anesthesia:  epidural anesthesia    Application and Delivery:    35 y/o  female presented to L&D with regular contractions. Patient was found to be in active labor upon arrival and epidural was administered. Amniotomy was performed at 7 cm. Patient continued to progress and reached complete dilatation and effacement. She then pushed two times (less than 5 minutes) and delivered a viable female  over an intact perineum from an ANKUSH presentation. Upon delivery of the head, a posterior nuchal arm (right) was identified and reduced. Shoulders and body delivered immediately after the head and  was placed on maternal abdomen for suctioning and drying. After delay, cord was clamped and cut. A segment of cord was collected but later discarded due to fetal wellbeing. Cord blood was collected. The placenta then delivered spontaneously intact with 3 vessel cord. The lower uterine segment was evaluated and found to be free of all clots and products of conception. The uterus was found to be firm and hemostasis assured. No cervical, vaginal or labial lacerations were noted. A first degree perineal laceration was repaired in the usual fashion with 3-0 Vicryl. The rectum was found to be intact.   Sponge, lap and needle counts were correct x 2. Patient tolerated the procedure well and was left to recover in L&D room in stable condition. Delivery Summary:  Labor & Delivery Summary  Dilation Complete Date: 12/08/22  Dilation Complete Time: 2142    Specimen:  Placenta sent to pathology     Intake/Output:     Date 12/08/22 0701 - 12/09/22 0700 12/09/22 0701 - 12/10/22 0700   Shift 6929-9994 8005-0431 24 Hour Total 5561-2884 3003-1118 24 Hour Total   INTAKE   Shift Total         OUTPUT   Urine  150 150      Shift Total  150 150      NET  -150 -150      EBL: 300 cc    Condition:  infant stable and mother stable    Blood Type and Rh: A POS        Rubella Immunity Status:   Immune           Infant Feeding:    breast    Attending Attestation: I performed the procedure.

## 2022-12-09 NOTE — PROGRESS NOTES
Patient up for the first time with the help of 2 RNs. Assist x 2 provided; patient tolerated well. Linens were changed, pericare performed, panties applied and pad changed. Patient unable to void at this time, will try again before 0430. Patient ambulated back to bed without difficulty. Patient is pink and stable.

## 2022-12-09 NOTE — H&P
Department of Obstetrics and Gynecology  Attending Obstetrics History and Physical        CHIEF COMPLAINT:  contractions    HISTORY OF PRESENT ILLNESS:      The patient is a 34 y.o.  4 parity 6127 at 45 weeks 3 days gestation with [facility] 22 presents for regular contractions. Denies loss of fluid, decreased fetal movement and vaginal bleeding. Pregnancy is complicated by marginal cord insertion (serial growth scans every 4 weeks), maternal weight, anxiety, first trimester UTI (treated with Keflex), and history of LEEP procedure. Patient presents with a chief complaint as above and is being admitted for active phase labor    DATES:    Last Menstrual Period:  22  Estimated Due Date:  22    PRENATAL CARE:    Provider:  JARETH Liu D.O.     Blood Type/Rh:  A positive  Antibody Screen:  positive--anti little c  Rubella:  immune  RPR:  non-reactive  Hepatitis B Surface Antigen: non-reactive  HIV:  non-reactive  Gonorrhea:  negative  Chlamydia:  negative  MSAFP/Multiple Markers:  Date:  ; Results:    U/S Structural Survery:  see report  1 hour Glucose Tolerance Test:  105  Group B Strep:  negative      PAST OB HISTORY        Depression:  No      Post-partum depression:  Yes      Diabetes:  No      Gestational Diabetes:  No      Thyroid Disease:  No      Chronic HTN:  No      Gestation HTN:  No      Pre-eclampsia:  No      Seizure disorder:  No      Asthma:  No      Clotting disorder:  No      :  No      Tubal ligation:  No      D & C:  No      Cerclage:  No      LEEP:  Yes       Myomectomy:  No    OB History    Para Term  AB Living   4 3 3     3   SAB IAB Ectopic Molar Multiple Live Births           0 3      # Outcome Date GA Lbr Viktor/2nd Weight Sex Delivery Anes PTL Lv   4 Current            3 Term 20 40w3d 08:12 / 00:40 9 lb 0.8 oz (4.104 kg) F Vag-Spont EPI N CARLI   2 Term 11 39w3d 12:25 7 lb 15 oz (3.6 kg) F Vag-Spont EPI N CARLI   1 Term 09 40w0d  7 lb 13 oz (3.544 kg) M Vag-Spont EPI N CARLI     Past Gynecological History:      Menarche:    Last menstrual period:  Patient's last menstrual period was 02/09/2022 (exact date). History of uterine fibroids:  No  History of endometriosis:  No  Pap History:  Last PAP was normal; August/2021. Sexually transmitted disease history: none    Past Medical History:        Diagnosis Date    Abnormal Pap smear of cervix     Hydronephrosis 6/26/2013    Migraine     Mild postpartum depression 6/5/2020    Obesity (BMI 30.0-34.9) 6/16/2014    Renal stone 6/26/2013    Varicella      Past Surgical History:        Procedure Laterality Date    ADENOIDECTOMY      LEEP  2012    TONSILLECTOMY      TYMPANOSTOMY TUBE PLACEMENT      WISDOM TOOTH EXTRACTION       Social History:    TOBACCO:   reports that she has never smoked. She has never used smokeless tobacco.  ETOH:   reports that she does not currently use alcohol. DRUGS:   reports no history of drug use.   Family History:       Problem Relation Age of Onset    Diabetes Father     High Blood Pressure Father     High Cholesterol Father     Cancer Maternal Aunt         breast    Uterine Cancer Maternal Aunt     Breast Cancer Maternal Aunt     Cancer Maternal Aunt 79        breast    No Known Problems Paternal Grandfather     Heart Attack Paternal Grandmother     Stroke Maternal Grandmother     No Known Problems Maternal Grandfather     Depression Mother     High Blood Pressure Mother     Other Brother         severe seasonal allergies     Medications Prior to Admission:  Medications Prior to Admission: promethazine (PHENERGAN) 25 MG tablet, Take 1 tablet by mouth 3 times daily as needed for Nausea  PROMETHAZINE-DM PO, Take by mouth  acetaminophen (TYLENOL) 500 MG tablet, Take 500 mg by mouth every 6 hours as needed for Pain  ondansetron (ZOFRAN-ODT) 8 MG TBDP disintegrating tablet, Take 1 tablet by mouth every 8 hours as needed for Nausea or Vomiting  omeprazole (PRILOSEC) 10 MG delayed release capsule, Take 10 mg by mouth daily  metoclopramide (REGLAN) 10 MG tablet, Take 1 tablet by mouth 3 times daily (with meals) (Patient not taking: Reported on 11/29/2022)  Dextromethorphan Polistirex (DEXTROMETHORPHAN ER 30 MG/5 ML ORAL SOLN CMPD),   docusate sodium (COLACE) 100 mg capsule, Take 100 mg by mouth 2 times daily (Patient not taking: No sig reported)  Prenatal Vit-DSS-Fe Cbn-FA (PRENATAL AD PO), Take by mouth  Doxylamine Succinate, Sleep, (UNISOM PO), Take by mouth  Vitamin D, Cholecalciferol, 10 MCG (400 UNIT) CAPS,   Allergies:  Augmentin [amoxicillin-pot clavulanate]    REVIEW OF SYSTEMS:    Patient has a history of depression postpartum.   Patient has no symptoms of depression  CONSTITUTIONAL:  negative for  fevers, chills, sweats, and fatigue  RESPIRATORY:  negative for  dry cough, cough with sputum, and dyspnea  CARDIOVASCULAR:  negative for  chest pain, dyspnea, palpitations  GASTROINTESTINAL:  negative for nausea, vomiting, diarrhea, and constipation  GENITOURINARY:  negative for dysuria and hematuria  INTEGUMENT/BREAST:  negative  MUSCULOSKELETAL:  negative  NEUROLOGICAL:  negative for headaches and visual disturbance  BEHAVIOR/PSYCH:  negative    PHYSICAL EXAM:  Vitals:    12/08/22 2315 12/08/22 2330 12/08/22 2345 12/09/22 0000   BP: 114/64 121/67 (!) 112/58 132/81   Pulse: 80 81 86 (!) 104   Resp: 16 16 16 16   Temp:    98 °F (36.7 °C)   TempSrc:    Oral   SpO2:           General appearance:  awake, alert, cooperative, no apparent distress, and appears stated age  Neurologic:  Mental Status Exam:  Level of Alertness:   awake  Orientation:   person, place, time  Memory:   normal  Fund of Knowledge:  normal  Attention/Concentration:  normal  Language:  normal  Lungs:  No increased work of breathing, good air exchange, clear to auscultation bilaterally, no crackles or wheezing  Heart:  normal S1 and S2  Abdomen:  soft, non-distended, and non-tender  Fetal heart rate:  Baseline Heart Rate 130, accelerations:  present  long term variability:  moderate  decelerations:  absent  Pelvis:  External Genitalia: General appearance; normal, Hair distribution; normal, Lesions absent  Cervix:    DILATION:  7 cm  EFFACEMENT:   90  STATION:  -3 cm  CONSISTENCY:  soft  POSITION:  mid      Contraction frequency:  2 minutes  Membranes:  Intact  Amniotomy performed for moderate amount of clear fluid. Pelvic Ultrasound:      General Labs:  CBC:   Lab Results   Component Value Date/Time    WBC 10.7 2022 06:49 PM    RBC 4.62 2022 06:49 PM    HGB 13.9 2022 06:49 PM    HCT 41.4 2022 06:49 PM    MCV 89.5 2022 06:49 PM    RDW 13.0 2022 06:49 PM     2022 06:49 PM       ASSESSMENT AND PLAN:    The patient is a 34 y.o.  4 parity 3003 at 45 weeks 4 days gestation  Active labor  Marginal cord insertion  Maternal weight  Anxiety  First trimester UTI (treated with Keflex)  History of LEEP procedure    Admission  Expectant management  Nursery notified--positive anti little c antibody--new finding  See orders    Priscilla Liu D.O.    [facility]

## 2022-12-09 NOTE — LACTATION NOTE
This note was copied from a baby's chart. Lactation Progress Note      Data:     Initial consult during lactation rounds with multip breast feeder, who delivered yesterday evening at 38.3 weeks gestation by . Mother reports that her right nipple is inverted and her left nipple is flat. Mother observed using a haakaa butterfly base nipple shield that she brought with her from home on consult. Mother reports she did not breast feed her first 2 children, but breast fed her last baby x 4 months. Mother states that she needed to use a nipple shield to latch, and states that she used a contact shield that the hospital provided but after a few weeks, states switched to this shield and preferred it due to it's ability to suction onto the nipple without falling off while trying to latch on. Mother declines issues with milk supply or infant weight gain, states stopped breastfeeding when she desired. Mother observed latching infant onto the right breast with haakaa shield on consult. Latch achieved, LC notes the sound of air being drawn in while infant suckling. Nipple is not drawn into infant's mouth with this shield. Action: Introduced self as  [facility] on for the day and offered support. Reviewed importance of AGUSTÍN to the breast, and gave tips to achieve AGUSTÍN directly to the breast. Reviewed tips to help stimulate nipple and drawn nipple out. Discussed various types of nipple shields, and potential risks related to using this shield including poor milk transfer and poor stimulation to the breast due to lack of contact of baby's mouth on the breast or nipple with sucking. Encouraged to try to latch without the shield or try weaning from the shield as able. Discussed if needing to use the nipple shield LC would recommend using a contact nipple shield to promote improved stimulation to the breast and improved milk transfer.  Discussed benefits of hand expression and pumping with hospital grade breast pump while using the nipple shield to protect milk supply and increase stimulation to the breast. Also, discussed pumping for a few moments prior to latching to stimulate nipples to protrude. Offered set up with hospital grade breast pump. Mother declines at this time, states willing to try a contact shield. Contact nipple shield provided. Educated on how to apply properly to nipple to encourage suction and that it stays in place while latching. Reviewed importance of deep latch with and without the shield to promote optimal milk transfer and prevent soreness or damage to nipples. Breast feeding guide booklet provided and reviewed with parents including breast care, how milk production works and types of milk mother will produce, educated on signs of hunger/satiety and expected  feeding behaviors, as well as reassuring signs that baby is getting enough at the breast including daily goals for infant feedings, output, and weight trends. Encouraged to offer the breast when infant first begins to wake and show early hunger cues, and every 2-3 hours if baby is sleepy and without feeding cues. Gave tips to wake sleepy baby as needed, and encouraged much hand expression and STS contact with attempts to offer the breast. Instructed that baby should have a minimum of 8-12 good feedings in a 24 hour period after the first DOL and importance to monitor for good output daily while using the shield. Name and number provided on whiteboard. Encouraged to call for 3 [facility] to assess latch with next feeding and offer support with latching without the shield as needed and for f/u support prn. Response: Verbalized understanding of teaching provided. Continues feeding with shield. Will call for f/u support prn.

## 2022-12-09 NOTE — ANESTHESIA PRE PROCEDURE
Department of Anesthesiology  Preprocedure Note       Name:  Pat Adjutant   Age:  34 y.o.  :  1990                                          MRN:  [id number]         Date:  2022      Surgeon: * No surgeons listed *    Procedure: * No procedures listed *    Medications prior to admission:   Prior to Admission medications    Medication Sig Start Date End Date Taking?  Authorizing Provider   promethazine (PHENERGAN) 25 MG tablet Take 1 tablet by mouth 3 times daily as needed for Nausea 22  Julian Liu DO   PROMETHAZINE-DM PO Take by mouth    Historical Provider, MD   acetaminophen (TYLENOL) 500 MG tablet Take 500 mg by mouth every 6 hours as needed for Pain    Historical Provider, MD   ondansetron (ZOFRAN-ODT) 8 MG TBDP disintegrating tablet Take 1 tablet by mouth every 8 hours as needed for Nausea or Vomiting 11/15/22   Julian Liu DO   omeprazole (PRILOSEC) 10 MG delayed release capsule Take 10 mg by mouth daily    Historical Provider, MD   metoclopramide (REGLAN) 10 MG tablet Take 1 tablet by mouth 3 times daily (with meals)  Patient not taking: Reported on 2022   Julian Liu DO   Dextromethorphan Polistirex (DEXTROMETHORPHAN ER 30 MG/5 ML ORAL SOLN CMPD)     Historical Provider, MD   docusate sodium (COLACE) 100 mg capsule Take 100 mg by mouth 2 times daily  Patient not taking: No sig reported    Historical Provider, MD   Prenatal Vit-DSS-Fe Cbn-FA (PRENATAL AD PO) Take by mouth    Historical Provider, MD   Doxylamine Succinate, Sleep, (UNISOM PO) Take by mouth    Historical Provider, MD   Vitamin D, Cholecalciferol, 10 MCG (400 UNIT) CAPS     Historical Provider, MD       Current medications:    Current Facility-Administered Medications   Medication Dose Route Frequency Provider Last Rate Last Admin    oxytocin (PITOCIN) 30 units in 500 mL infusion Override Pull             lactated ringers infusion   IntraVENous Continuous Julian Thayer Federer, DO        lactated ringers bolus  500 mL IntraVENous PRN Angel Liu, DO        Or    lactated ringers bolus  1,000 mL IntraVENous PRN Angel Paredesr, DO        sodium chloride flush 0.9 % injection 5-40 mL  5-40 mL IntraVENous 2 times per day Angel Liu, DO        sodium chloride flush 0.9 % injection 5-40 mL  5-40 mL IntraVENous PRN Angel Paredesr, DO        0.9 % sodium chloride infusion  25 mL IntraVENous PRN Angel Paredesr, DO        ondansetron [facility]) injection 4 mg  4 mg IntraVENous Q6H PRN Angel Paredesr, DO        diphenhydrAMINE (BENADRYL) injection 25 mg  25 mg IntraVENous Q4H PRN Angel Paredesr, DO        acetaminophen (TYLENOL) tablet 650 mg  650 mg Oral Q4H PRN Angel Paredesr, DO        benzocaine-menthol (DERMOPLAST) 20-0.5 % spray   Topical PRN Angel Paredesr, DO        docusate sodium (COLACE) capsule 100 mg  100 mg Oral BID Angel Liu, DO         Facility-Administered Medications Ordered in Other Encounters   Medication Dose Route Frequency Provider Last Rate Last Admin    bupivacaine (PF) (MARCAINE) 0.5 % injection   Intrathecal PRN KATERIN Dimas - CRNA   1.5 mL at 12/08/22 1931       Allergies:     Allergies   Allergen Reactions    Augmentin [Amoxicillin-Pot Clavulanate] Rash       Problem List:    Patient Active Problem List   Diagnosis Code    Obesity in pregnancy, antepartum O99.210    History of cervical dysplasia Z87.410    History of cervical LEEP biopsy affecting care of mother, antepartum O34.40, Z98.890    History of postpartum depression Z87.59, Z86.59    Prenatal care, subsequent pregnancy in third trimester Z34.83    Prenatal care in second trimester Z34.92    Marginal insertion of umbilical cord affecting management of mother O43.80    Maternal obesity affecting pregnancy, antepartum O99.210    Normal labor O80, Z37.9       Past Medical History:        Diagnosis Date    Abnormal Pap smear of cervix     Hydronephrosis 6/26/2013    Migraine     Mild postpartum depression 6/5/2020    Obesity (BMI 30.0-34.9) 6/16/2014    Renal stone 6/26/2013    Varicella        Past Surgical History:        Procedure Laterality Date    ADENOIDECTOMY      LEEP  2012    TONSILLECTOMY      TYMPANOSTOMY TUBE PLACEMENT      WISDOM TOOTH EXTRACTION         Social History:    Social History     Tobacco Use    Smoking status: Never    Smokeless tobacco: Never   Substance Use Topics    Alcohol use: Not Currently     Alcohol/week: 0.0 standard drinks                                Counseling given: Not Answered      Vital Signs (Current):   Vitals:    12/08/22 1909 12/08/22 1921 12/08/22 1946 12/08/22 1951   BP: 130/68  125/68 (!) 101/55   Pulse: 93  (!) 112 (!) 118   Temp:  37.6 °C (99.7 °F)     TempSrc:  Axillary                                                BP Readings from Last 3 Encounters:   12/08/22 (!) 101/55   12/07/22 120/76   11/29/22 118/84       NPO Status:                                                                                 BMI:   Wt Readings from Last 3 Encounters:   12/07/22 228 lb 3.2 oz (103.5 kg)   11/29/22 231 lb 12.8 oz (105.1 kg)   11/15/22 230 lb (104.3 kg)     There is no height or weight on file to calculate BMI.    CBC:   Lab Results   Component Value Date/Time    WBC 10.7 12/08/2022 06:49 PM    RBC 4.62 12/08/2022 06:49 PM    HGB 13.9 12/08/2022 06:49 PM    HCT 41.4 12/08/2022 06:49 PM    MCV 89.5 12/08/2022 06:49 PM    RDW 13.0 12/08/2022 06:49 PM     12/08/2022 06:49 PM       CMP:   Lab Results   Component Value Date/Time     08/13/2021 12:19 PM    K 4.2 08/13/2021 12:19 PM     08/13/2021 12:19 PM    CO2 25 08/13/2021 12:19 PM    BUN 9 08/13/2021 12:19 PM    CREATININE 0.6 08/13/2021 12:19 PM    GFRAA >60 08/13/2021 12:19 PM    GFRAA >60 11/25/2010 09:01 PM    AGRATIO 1.8 08/13/2021 12:19 PM    LABGLOM >60 08/13/2021 12:19 PM    GLUCOSE 88 08/13/2021 12:19 PM    PROT 7.2 08/13/2021 12:19 PM    PROT 6.6 11/25/2010 09:05 PM    CALCIUM 9.7 08/13/2021 12:19 PM    BILITOT 1.3 08/13/2021 12:19 PM    ALKPHOS 79 08/13/2021 12:19 PM    AST 15 08/13/2021 12:19 PM    ALT 9 08/13/2021 12:19 PM       POC Tests: No results for input(s): POCGLU, POCNA, POCK, POCCL, POCBUN, POCHEMO, POCHCT in the last 72 hours. Coags: No results found for: PROTIME, INR, APTT    HCG (If Applicable):   Lab Results   Component Value Date    PREGTESTUR neg 07/15/2020        ABGs: No results found for: PHART, PO2ART, CAQ4QIF, DZT5AXY, BEART, N0YNLJAE     Type & Screen (If Applicable):  No results found for: LABABO, LABRH    Drug/Infectious Status (If Applicable):  No results found for: HIV, HEPCAB    COVID-19 Screening (If Applicable):   Lab Results   Component Value Date/Time    COVID19 NOT DETECTED 05/12/2020 08:53 AM           Anesthesia Evaluation   no history of anesthetic complications:   Airway: Mallampati: II  TM distance: >3 FB   Neck ROM: full  Mouth opening: > = 3 FB   Dental: normal exam         Pulmonary:Negative Pulmonary ROS and normal exam                               Cardiovascular:Negative CV ROS                      Neuro/Psych:   (+) headaches: migraine headaches, psychiatric history (Post partum depression):            GI/Hepatic/Renal:   (+) renal disease (Hydronephrosis):,           Endo/Other: Negative Endo/Other ROS                    Abdominal:             Vascular: negative vascular ROS. Other Findings:           Anesthesia Plan      epidural     ASA 2             Anesthetic plan and risks discussed with patient and spouse. Plan discussed with attending.                     KATERIN Burrell - RON   12/8/2022

## 2022-12-09 NOTE — PROGRESS NOTES
Elisha Huang, CRNA at bedside for epidural placement [phone], difficulty obtaining FHTs/ctx during that time.

## 2022-12-09 NOTE — ANESTHESIA POSTPROCEDURE EVALUATION
Department of Anesthesiology  Postprocedure Note    Patient: Cheng Brand  MRN: [id number]  YOB: 1990  Date of evaluation: 12/9/2022      Procedure Summary     Date: 12/08/22 Room / Location:     Anesthesia Start: 1924 Anesthesia Stop: 2154    Procedure: Labor Analgesia Diagnosis:     Scheduled Providers:  Responsible Provider: Radha Sanderson MD    Anesthesia Type: epidural ASA Status: 2          Anesthesia Type: No value filed. Ismael Phase I: Ismael Score: 9    Ismael Phase II: Ismael Score: 10      Anesthesia Post Evaluation    Level of consciousness: awake  Complications: no  Cardiovascular status: hemodynamically stable  Respiratory status: acceptable  Comments: No apparent complications from neuraxial anesthesia.

## 2022-12-09 NOTE — ANESTHESIA PROCEDURE NOTES
Epidural Block    Patient location during procedure: OB  Start time: 12/8/2022 7:24 PM  End time: 12/8/2022 7:47 PM  Reason for block: labor epidural  Staffing  Performed: resident/CRNA   Resident/CRNA: KATERIN Gamez CRNA  Epidural  Patient position: sitting  Prep: ChloraPrep  Patient monitoring: continuous pulse ox  Approach: midline  Location: L3-4  Injection technique: DANIELITO saline  Provider prep: mask  Needle  Needle type: Tuohy   Needle gauge: 17 G  Needle length: 3.5 in  Needle insertion depth: 6 cm  Catheter type: side hole  Catheter size: 19 G  Catheter at skin depth: 11 cm  Test dose: negativeCatheter Secured: tegaderm and tape  Assessment  Sensory level: T8  Hemodynamics: stable  Attempts: 3+  Outcomes: uncomplicated and patient tolerated procedure well  Additional Notes  Sitting, Sterile prep/drape, 1%Xylo at L3-4, 17ga Tuohy with DANIELITO, 25ga Pencan for w/+CSF for CSE, 1.5ml 0.5% Marcaine PF intrathecally, Pencan removed, Catheter inserted, unable to flush catheter, catheter removed with tip intact, catheter flushed easily, 17ga Tuohy with DANIELITO @ L3-4, Catheter inserted, unable to flush catheter, catheter removed with tip intact, new epidural kit opened and new catheter used, 17ga Tuohy with DANIELITO @ L3-4, Catheter inserted, able to flush catheter, negative test dose, sterile dressing applied.    Preanesthetic Checklist  Completed: patient identified, IV checked, site marked, risks and benefits discussed, surgical/procedural consents, equipment checked, pre-op evaluation, timeout performed, anesthesia consent given, oxygen available and monitors applied/VS acknowledged

## 2022-12-09 NOTE — PLAN OF CARE
Pt noted to be completely dilated/effaced at , RN notified Dr. Nithya Salgado who came to bedside for anticipated delivery. Began pushing at  and after 2 pushes pt delivered infant girl at . Placenta delivered spontaneously at  and a postpartum pitocin bolus was administered at that time. Dr. Nithya Salgado repaired a 1st degree perineal tear and pt entered recovery phase of care at 2200.   Problem: Pain  Goal: Verbalizes/displays adequate comfort level or baseline comfort level  Outcome: Progressing  Flowsheets (Taken 2022 0000)  Verbalizes/displays adequate comfort level or baseline comfort level:   Encourage patient to monitor pain and request assistance   Assess pain using appropriate pain scale   Administer analgesics based on type and severity of pain and evaluate response   Implement non-pharmacological measures as appropriate and evaluate response   Consider cultural and social influences on pain and pain management   Notify Licensed Independent Practitioner if interventions unsuccessful or patient reports new pain     Problem: Vaginal Birth or  Section  Goal: Fetal and maternal status remain reassuring during the birth process  Description:  Birth OB-Pregnancy care plan goal which identifies if the fetal and maternal status remain reassuring during the birth process  Outcome: Completed     Problem: Postpartum  Goal: Experiences normal postpartum course  Description:  Postpartum OB-Pregnancy care plan goal which identifies if the mother is experiencing a normal postpartum course  Outcome: Progressing  Goal: Appropriate maternal -  bonding  Description:  Postpartum OB-Pregnancy care plan goal which identifies if the mother and  are bonding appropriately  Outcome: Progressing  Goal: Establishment of infant feeding pattern  Description:  Postpartum OB-Pregnancy care plan goal which identifies if the mother is establishing a feeding pattern with their   Outcome: Progressing  Goal: Incisions, wounds, or drain sites healing without S/S of infection  Outcome: Progressing     Problem: Infection - Adult  Goal: Absence of infection at discharge  Outcome: Progressing  Goal: Absence of infection during hospitalization  Outcome: Progressing  Goal: Absence of fever/infection during anticipated neutropenic period  Outcome: Progressing     Problem: Safety - Adult  Goal: Free from fall injury  Outcome: Progressing     Problem: Discharge Planning  Goal: Discharge to home or other facility with appropriate resources  Outcome: Progressing     Problem: Chronic Conditions and Co-morbidities  Goal: Patient's chronic conditions and co-morbidity symptoms are monitored and maintained or improved  Outcome: Progressing

## 2022-12-09 NOTE — PROGRESS NOTES
Post Partum Progress Note    Subjective:  Elayne Wright is a 34 y.o. [id number] status-post  uncomplicated Vaginal Delivery. The pregnancy was complicated by  marginal cord insertion (serial growth scans every 4 weeks), maternal weight, anxiety, first trimester UTI (treated with Keflex), and history of LEEP procedure . Patient is doing well with no concerns. Pain is well controlled with current regimen. Lochia moderate. Tolerating regular diet without difficulty. Ambulating without difficulty, denies dizziness on standing. Voiding without difficulty. She is breast feeding. Denies chest pain, SOB, or leg pain. Objective:  /70   Pulse 81   Temp 97.8 °F (36.6 °C) (Oral)   Resp 16   LMP 02/09/2022 (Exact Date)   SpO2 100%   Breastfeeding Unknown     GENERAL APPEARANCE: alert, well appearing, in no apparent distress  LUNGS: clear to auscultation, no wheezes, rales or rhonchi, symmetric air entry  HEART: regular rate and rhythm  ABDOMEN POSTPARTUM: soft, nontender, fundus firm at U  EXTREMITIES: no redness or tenderness in the calves or thighs, no edema    I/O last 3 completed shifts:  In: -   Out: 975 [Urine:550; Blood:425]    Pertinent Labs:   CBC:   Recent Labs     12/08/22  1849   WBC 10.7   HGB 13.9   HCT 41.4        BMP:  No results for input(s): NA, K, CL, CO2, BUN, CREATININE, GLUCOSE in the last 72 hours. Hepatic: No results for input(s): AST, ALT, ALB, BILITOT, ALKPHOS in the last 72 hours. Mag:    No results for input(s): MG in the last 72 hours. Phos:   No results for input(s): PHOS in the last 72 hours. INR: No results for input(s): INR in the last 72 hours. Assessment/Plan:  Elayne Wright is a 34 y.o. [id number] status-post  uncomplicated Vaginal Delivery    1. PPD #1: doing well, routine care    2. Infant: F, 9/9, 3418g    3. Marginal cord insertion    4. Maternal weight    5. Anxietey    6.  Labs: A+/ab+ (anti-c), RI, GBS neg  - peds aware of positive antibody    Dispo: d/c home PPD2 pending clinical course    Jennifer Nicolas MD  [facility]

## 2022-12-10 VITALS
SYSTOLIC BLOOD PRESSURE: 114 MMHG | DIASTOLIC BLOOD PRESSURE: 75 MMHG | OXYGEN SATURATION: 100 % | TEMPERATURE: 97.9 F | RESPIRATION RATE: 18 BRPM | HEART RATE: 63 BPM

## 2022-12-10 LAB
BASOPHILS ABSOLUTE: 0 K/UL (ref 0–0.2)
BASOPHILS RELATIVE PERCENT: 0.4 %
DAT IGG CAPTURE: NORMAL
EOSINOPHILS ABSOLUTE: 0.2 K/UL (ref 0–0.6)
EOSINOPHILS RELATIVE PERCENT: 3.1 %
HCT VFR BLD CALC: 37.3 % (ref 36–48)
HEMOGLOBIN: 12.2 G/DL (ref 12–16)
LYMPHOCYTES ABSOLUTE: 2.2 K/UL (ref 1–5.1)
LYMPHOCYTES RELATIVE PERCENT: 30.9 %
MCH RBC QN AUTO: 29.5 PG (ref 26–34)
MCHC RBC AUTO-ENTMCNC: 32.6 G/DL (ref 31–36)
MCV RBC AUTO: 90.6 FL (ref 80–100)
MONOCYTES ABSOLUTE: 0.6 K/UL (ref 0–1.3)
MONOCYTES RELATIVE PERCENT: 8.8 %
NEUTROPHILS ABSOLUTE: 4 K/UL (ref 1.7–7.7)
NEUTROPHILS RELATIVE PERCENT: 56.8 %
PDW BLD-RTO: 13.1 % (ref 12.4–15.4)
PLATELET # BLD: 197 K/UL (ref 135–450)
PMV BLD AUTO: 9.7 FL (ref 5–10.5)
RBC # BLD: 4.12 M/UL (ref 4–5.2)
WBC # BLD: 7 K/UL (ref 4–11)

## 2022-12-10 PROCEDURE — 36415 COLL VENOUS BLD VENIPUNCTURE: CPT

## 2022-12-10 PROCEDURE — 99024 POSTOP FOLLOW-UP VISIT: CPT | Performed by: OBSTETRICS & GYNECOLOGY

## 2022-12-10 PROCEDURE — 6370000000 HC RX 637 (ALT 250 FOR IP): Performed by: OBSTETRICS & GYNECOLOGY

## 2022-12-10 PROCEDURE — 85025 COMPLETE CBC W/AUTO DIFF WBC: CPT

## 2022-12-10 RX ORDER — IBUPROFEN 800 MG/1
800 TABLET ORAL EVERY 8 HOURS PRN
Qty: 40 TABLET | Refills: 0 | Status: SHIPPED | OUTPATIENT
Start: 2022-12-10

## 2022-12-10 RX ADMIN — IBUPROFEN 800 MG: 800 TABLET, FILM COATED ORAL at 11:19

## 2022-12-10 RX ADMIN — ACETAMINOPHEN 1000 MG: 500 TABLET ORAL at 05:39

## 2022-12-10 RX ADMIN — IBUPROFEN 800 MG: 800 TABLET, FILM COATED ORAL at 02:59

## 2022-12-10 RX ADMIN — DOCUSATE SODIUM 100 MG: 100 CAPSULE, LIQUID FILLED ORAL at 09:43

## 2022-12-10 ASSESSMENT — PAIN - FUNCTIONAL ASSESSMENT
PAIN_FUNCTIONAL_ASSESSMENT: ACTIVITIES ARE NOT PREVENTED
PAIN_FUNCTIONAL_ASSESSMENT: ACTIVITIES ARE NOT PREVENTED

## 2022-12-10 ASSESSMENT — PAIN DESCRIPTION - LOCATION
LOCATION: ABDOMEN
LOCATION: ABDOMEN;PERINEUM
LOCATION: PERINEUM

## 2022-12-10 ASSESSMENT — PAIN DESCRIPTION - DESCRIPTORS
DESCRIPTORS: DISCOMFORT
DESCRIPTORS: DISCOMFORT

## 2022-12-10 ASSESSMENT — PAIN SCALES - GENERAL
PAINLEVEL_OUTOF10: 1
PAINLEVEL_OUTOF10: 2

## 2022-12-10 ASSESSMENT — PAIN DESCRIPTION - ORIENTATION
ORIENTATION: LOWER
ORIENTATION: LOWER

## 2022-12-10 NOTE — PROGRESS NOTES
Patient discharged home in stable condition with parents. Bands checked. Discharge instructions given to parents, both verbalized an understanding. Patient in wheelchair.

## 2022-12-10 NOTE — FLOWSHEET NOTE
Discharge Phone Call    Patient Name: Hari Faith     [facility] Provider: Jayce Godinez DO Discharge Date: 12/10/2022    Disposition of baby:    Phone Number: [phone] (home)     Attempts to Contact:  Date:    Caller  Date:    Caller  Date:    Caller    Information for the patient's :  Nimisha Wayne [[id number]]   Delivery Method: Vaginal, Spontaneous     1. Now that you are at home is your pain being well controlled? Y/N   If no, instruct to call       provider. 2. Are you breastfeeding? Y/N    Do you need any extra support from our lactation staff? Y/N    If yes, provide number for lactation. 3. Have you made or already had your first appointment with the baby's doctor? Y/N   If no, do      you know when to schedule it? Y/N    4. Have you scheduled your follow-up appointment? Y/N  If no, do you know when to schedule       it? Y/N   If no, they can find it on printed discharge instructions. 5. Did staff discuss safe sleep during your stay? Y/N   6. Did we explain things in a way you could understand? Y/N  7. Were we respectful of your preferences for labor and birth and include you in the plan of       care? Y/N  If no, please explain _______________________________________________  8. Is there anyone in particular you would like to mention who provided care for you? _______      _________________________________________________________________________     9. Were you given a Post-Birth Warning Signs handout? Y/N  Do you have it somewhere      easily accessible? Y/N  If no, please send them a copy and ask them to put it somewhere      easily found. 10. Have you been crying excessively, having anger or mood swings that feel out of control, or       feel like you can't cope with caring for yourself or baby? Y/N   If yes, they may be showing       signs of postpartum depression and should call provider.  There is also a        depression test on page C5 in their discharge booklet they can take. 13. Do you have any other questions or concerns I can address today?  Y/N  ______________      _________________________________________________________________________    Information provided during call :_________________________________________________  ___________________________________________________________________________    Call completed by:____________________________    Date:_________ Time:___________

## 2022-12-10 NOTE — PROGRESS NOTES
Post Partum Progress Note     Subjective:  Richard Arndt is a 34 y.o. [id number] status-post  uncomplicated Vaginal Delivery. The pregnancy was complicated by  marginal cord insertion (serial growth scans every 4 weeks), maternal weight, anxiety, first trimester UTI (treated with Keflex), and history of LEEP procedure . Patient is doing well with no concerns. Pain is well controlled with current regimen. Lochia mild. Tolerating regular diet without difficulty. Ambulating without difficulty, denies dizziness on standing. Voiding without difficulty. She is breast feeding. Denies chest pain, SOB, or leg pain.      Objective:  Vitals:    12/10/22 1005   BP: 114/75   Pulse: 63   Resp: 18   Temp: 97.9 °F (36.6 °C)   SpO2:         GENERAL APPEARANCE: alert, well appearing, in no apparent distress  LUNGS: clear to auscultation, no wheezes, rales or rhonchi, symmetric air entry  HEART: regular rate and rhythm  ABDOMEN POSTPARTUM: soft, nontender, fundus firm at U  EXTREMITIES: no redness or tenderness in the calves or thighs, no edema   CBC auto differential  Order: [id number]  Status: Final result    Visible to patient: Yes (seen)    Next appt: None    0 Result Notes  Component Ref Range & Units 12/10/22 0732 12/8/22 1849 10/5/22 1656 6/8/22 0959 8/13/21 1219 5/23/20 0818 5/22/20 0915   WBC 4.0 - 11.0 K/uL 7.0  10.7  8.0  5.5  5.5  10.7  12.3 High     RBC 4.00 - 5.20 M/uL 4.12  4.62  3.83 Low   4.32  4.62  4.06  4.69    Hemoglobin 12.0 - 16.0 g/dL 12.2  13.9  12.0  13.3  14.8  12.5  14.3    Hematocrit 36.0 - 48.0 % 37.3  41.4  36.5  40.0  43.5  37.5  42.8    MCV 80.0 - 100.0 fL 90.6  89.5  95.3  92.6  94.0  92.3  91.2    MCH 26.0 - 34.0 pg 29.5  30.1  31.2  30.8  32.1  30.7  30.6    MCHC 31.0 - 36.0 g/dL 32.6  33.6  32.8  33.3  34.1  33.2  33.6    RDW 12.4 - 15.4 % 13.1  13.0  14.1  13.1  12.7  14.4  14.0    Platelets 262 - 918 K/uL 197  260  223  241  304  161  237    MPV 5.0 - 10.5 fL 9.7  9.3  9.4  9.2  8.5  9.9 9.7    Neutrophils % % 56.8  61.9  73.0  58.4  46.9  69.1  60.1    Lymphocytes % % 30.9  27.3  18.0  30.6  40.8  22.4  29.1    Monocytes % % 8.8  8.3  4.0  6.8  6.7  6.6  8.2    Eosinophils % % 3.1  1.5  3.0  3.7  5.0  1.2  2.1    Basophils % % 0.4  1.0  0.0  0.5  0.6  0.7  0.5    Neutrophils Absolute 1.7 - 7.7 K/uL 4.0  6.6  5.9  3.2  2.6  7.4  7.4    Lymphocytes Absolute 1.0 - 5.1 K/uL 2.2  2.9  1.5  1.7  2.2  2.4  3.6    Monocytes Absolute 0.0 - 1.3 K/uL 0.6  0.9  0.3  0.4  0.4  0.7  1.0    Eosinophils Absolute 0.0 - 0.6 K/uL 0.2  0.2  0.2  0.2  0.3  0.1  0.3    Basophils Absolute 0.0 - 0.2 K/uL 0.0  0.1  0.0  0.0  0.0  0.1  0.1    PLATELET SLIDE REVIEW    Adequate        SLIDE REVIEW    see below CM        Bands Relative    1 R        Atypical Lymphocytes Relative    1 R        RBC Morphology    Normal        Resulting Agency  [address] [facility]              Specimen Collected: 12/10/22 07:32 EST Last Resulted: 12/10/22 08:12 EST                 Assessment/Plan:  Olga Rodas is a 34 y.o. [id number] status-post  uncomplicated Vaginal Delivery     1. PPD #2: doing well, routine care     2. Infant: F, 9/9, 3418g     3. Marginal cord insertion     4. Maternal weight     5. Anxietey     6. Labs: A+/ab+ (anti-c), RI, GBS neg  - peds aware of positive antibody     Dispo: d/c home today      [address] MARY Liu D.O.   [facility]

## 2022-12-10 NOTE — DISCHARGE INSTR - DIET

## 2022-12-10 NOTE — DISCHARGE SUMMARY
DISCHARGE SUMMARY      Primary Diagnosis: intrauterine pregnancy at 38 weeks 3 days gestation, active labor  Secondary Diagnosis: marginal cord insertion, maternal weight, little anti-c antibody, anxiety  Procedures:   Referrals: lactation  Significant Findings: little anti-c antibody, viable female   Reason for Hospitalization: active labor  Complications: none        Discharge Instructions:    Diet: common adult  Activity: activity as tolerated, no heavy lifting or driving for 2 weeks, pelvic rest x 6 weeks  Medications: ibuprofen, colace, Tylenol  Disposition: Home  Condition on discharge: Stable  Follow up: in 2 week(s)

## 2022-12-10 NOTE — PROGRESS NOTES
Dr. Shiv Pearce notified of PPD score of 12. Advises to have RN speak with patient and call MD if patient would like to start on medication.

## 2022-12-12 LAB
BLOOD BANK DISPENSE STATUS: NORMAL
BLOOD BANK DISPENSE STATUS: NORMAL
BLOOD BANK PRODUCT CODE: NORMAL
BLOOD BANK PRODUCT CODE: NORMAL
BPU ID: NORMAL
BPU ID: NORMAL
DESCRIPTION BLOOD BANK: NORMAL
DESCRIPTION BLOOD BANK: NORMAL

## 2022-12-19 ENCOUNTER — POSTPARTUM VISIT (OUTPATIENT)
Dept: OBGYN CLINIC | Age: 32
End: 2022-12-19

## 2022-12-19 VITALS
WEIGHT: 204.2 LBS | HEART RATE: 84 BPM | TEMPERATURE: 97.5 F | SYSTOLIC BLOOD PRESSURE: 114 MMHG | BODY MASS INDEX: 35.05 KG/M2 | DIASTOLIC BLOOD PRESSURE: 86 MMHG

## 2022-12-19 DIAGNOSIS — Z86.59 HISTORY OF POSTPARTUM DEPRESSION: ICD-10-CM

## 2022-12-19 DIAGNOSIS — Z87.59 HISTORY OF POSTPARTUM DEPRESSION: ICD-10-CM

## 2022-12-19 DIAGNOSIS — O36.1930 MATERNAL ATYPICAL ANTIBODY AFFECTING PREGNANCY IN THIRD TRIMESTER, SINGLE OR UNSPECIFIED FETUS: ICD-10-CM

## 2022-12-19 DIAGNOSIS — Z98.890 HISTORY OF LOOP ELECTRICAL EXCISION PROCEDURE (LEEP): ICD-10-CM

## 2022-12-19 PROCEDURE — 0503F POSTPARTUM CARE VISIT: CPT | Performed by: OBSTETRICS & GYNECOLOGY

## 2022-12-28 PROBLEM — O36.1930 MATERNAL ATYPICAL ANTIBODY AFFECTING PREGNANCY IN THIRD TRIMESTER: Status: ACTIVE | Noted: 2022-12-28

## 2022-12-28 PROBLEM — Z34.92 PRENATAL CARE IN SECOND TRIMESTER: Status: RESOLVED | Noted: 2022-09-09 | Resolved: 2022-12-28

## 2022-12-28 PROBLEM — O43.199 MARGINAL INSERTION OF UMBILICAL CORD AFFECTING MANAGEMENT OF MOTHER: Status: RESOLVED | Noted: 2022-09-09 | Resolved: 2022-12-28

## 2022-12-28 PROBLEM — O34.40 HISTORY OF CERVICAL LEEP BIOPSY AFFECTING CARE OF MOTHER, ANTEPARTUM: Status: RESOLVED | Noted: 2019-12-02 | Resolved: 2022-12-28

## 2022-12-28 PROBLEM — Z34.83 PRENATAL CARE, SUBSEQUENT PREGNANCY IN THIRD TRIMESTER: Status: RESOLVED | Noted: 2022-06-08 | Resolved: 2022-12-28

## 2022-12-28 PROBLEM — F34.1 DYSTHYMIA: Status: ACTIVE | Noted: 2022-12-28

## 2022-12-28 PROBLEM — O99.210 MATERNAL OBESITY AFFECTING PREGNANCY, ANTEPARTUM: Status: RESOLVED | Noted: 2022-10-30 | Resolved: 2022-12-28

## 2022-12-28 PROBLEM — Z98.890 HISTORY OF LOOP ELECTRICAL EXCISION PROCEDURE (LEEP): Status: ACTIVE | Noted: 2022-12-28

## 2022-12-28 PROBLEM — Z98.890 HISTORY OF CERVICAL LEEP BIOPSY AFFECTING CARE OF MOTHER, ANTEPARTUM: Status: RESOLVED | Noted: 2019-12-02 | Resolved: 2022-12-28

## 2022-12-28 ASSESSMENT — ENCOUNTER SYMPTOMS
GASTROINTESTINAL NEGATIVE: 1
RESPIRATORY NEGATIVE: 1
NAUSEA: 0
DIARRHEA: 0
ABDOMINAL PAIN: 0
CONSTIPATION: 0
VOMITING: 0
SHORTNESS OF BREATH: 0

## 2022-12-28 NOTE — PROGRESS NOTES
Subjective:      Patient ID: Radha Oliver is a 34 y.o. female. HPI  35 y/o  female presents for postpartum visit. Patient is 2 weeks status-post  uncomplicated Vaginal Delivery. The pregnancy was complicated by  marginal cord insertion (serial growth scans every 4 weeks), maternal weight, anxiety, first trimester UTI (treated with Keflex), and history of LEEP procedure. Patient has done well since delivery. Lochia is decreasing--4-5 pads per day. Denies pain. Denies headache, vision changes, and RUQ pain. Denies fever, chills, chest pain, shortness of breath, nausea, vomiting, diarrhea, constipation, dysuria and hematuria. Has noted some mood swings postpartum--interested in talking to a counselor. Has excellent support. Denies suicidal and homicidal ideation. Has been anxious over the antibody found at the time of delivery. Breast feeding. Interested in IUD for contraception. Review of Systems   Constitutional: Negative. Negative for activity change, appetite change, chills, fatigue, fever and unexpected weight change. Eyes:  Negative for visual disturbance. Respiratory: Negative. Negative for shortness of breath. Cardiovascular: Negative. Negative for chest pain. Gastrointestinal: Negative. Negative for abdominal pain, constipation, diarrhea, nausea and vomiting. Endocrine: Negative for cold intolerance and heat intolerance. Genitourinary:  Positive for vaginal bleeding. Negative for difficulty urinating, dysuria, hematuria, pelvic pain, urgency, vaginal discharge and vaginal pain. Skin: Negative. Neurological:  Negative for headaches. Hematological:  Does not bruise/bleed easily. Psychiatric/Behavioral:  Positive for dysphoric mood. Negative for suicidal ideas. The patient is nervous/anxious. Objective:   Physical Exam  Vitals and nursing note reviewed. Constitutional:       General: She is not in acute distress. Appearance: Normal appearance. She is well-developed. She is not ill-appearing, toxic-appearing or diaphoretic. Pulmonary:      Effort: Pulmonary effort is normal.   Skin:     General: Skin is warm and dry. Neurological:      Mental Status: She is alert and oriented to person, place, and time. Psychiatric:         Mood and Affect: Mood normal.         Behavior: Behavior normal.         Thought Content: Thought content normal.         Judgment: Judgment normal.       Assessment:       Diagnosis Orders   1. Postpartum care following vaginal delivery        2. History of postpartum depression        3. Postpartum depression        4. History of loop electrical excision procedure (LEEP)        5. Maternal atypical antibody affecting pregnancy in third trimester, single or unspecified fetus                Plan:         Referral to counseling--today  Bleeding precautions  Repeat type and screen in 4 weeks  Mirena IUD process initiated. Follow up prn and 4 weeks for postpartum visit and labs.       Jorgito Lui DO

## 2022-12-28 NOTE — PROGRESS NOTES
35 y/o  female at 38 weeks 2 days gestation with [address] presents for prenatal visit and fetal monitoring    Pregnancy is complicated by marginal cord insertion (serial growth scans every 4 weeks), maternal weight, anxiety, first trimester UTI (treated with Keflex), and history of LEEP procedure. FDLMP: 3/20/22. Patient is 2 years s/p . Previous pregnancy was complicated by GBS positive, obesity, anxiety, and history of LEEP procedure. 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 Had normal pap smear in , , , 2016, 2017, 2018, and 2019. Denies vaginal bleeding, loss of fluid and pelvic pain. Positive fetal movement. Admits to  some headaches. Denies vision changes and RUQ pain  Admits to fatigue   Nausea has transitioned to morning sickness--vomits x1 in AM--takes Zofran--helps. Admits to baseline shortness of breath and constipation. Denies fever, chills, chest pain, diarrhea, dysuria and hematuria  Maternal Wellness Questionnaire ---improvement noted  -50/-2 vertex    OB ULTRASOUND:  BIOPHYSICAL PROFILE     DATE: 22     PHYSICIAN: JARETH MEDINA     SONOGRAPHER: MARY Dutta RDMS     INDICATION: Fetal well being, MCI     TYPE OF SCAN: abdominal     BPP: 8/8  Tone: 2, Gross fetal movement: 2, Breathin, Fluid: 2     FINDINGS:  A single viable intrauterine pregnancy is noted in cephalic presentation. Cardiac and somatic activity are noted.      The following values were obtained:              Fetal heart rate 144bpm              Amniotic fluid index 13.30cm     IMPRESSION:   Single live IUP in the third trimester. BPP 8/8. KRYSTINA 13.30cm.     Imaging is limited secondary to fetal position. The patient is well aware of the limitations of ultrasound in the detection of anomalies. Diagnosis Orders   1. Prenatal care, subsequent pregnancy in third trimester        2.  Marginal insertion of umbilical cord affecting management of mother        3. Maternal obesity affecting pregnancy, antepartum        4. Obesity in pregnancy, antepartum        5. History of cervical dysplasia        6. History of cervical LEEP biopsy affecting care of mother, antepartum        7. History of postpartum depression          Options for delivery reviewed:  Expectant management versus induction of labor. Risks and benefits discussed.    Induction of labor scheduled for 12/19/22 at 7 AM  Labor precautions kick counts  Follow up prn and 1 week for prenatal visit and fetal monitoring

## 2023-01-04 ENCOUNTER — TELEPHONE (OUTPATIENT)
Dept: OBGYN CLINIC | Age: 33
End: 2023-01-04

## 2023-01-17 ENCOUNTER — POSTPARTUM VISIT (OUTPATIENT)
Dept: OBGYN CLINIC | Age: 33
End: 2023-01-17
Payer: COMMERCIAL

## 2023-01-17 VITALS
TEMPERATURE: 97.6 F | SYSTOLIC BLOOD PRESSURE: 110 MMHG | HEART RATE: 71 BPM | DIASTOLIC BLOOD PRESSURE: 72 MMHG | WEIGHT: 205.8 LBS | BODY MASS INDEX: 35.33 KG/M2

## 2023-01-17 DIAGNOSIS — Z86.59 HISTORY OF POSTPARTUM DEPRESSION: ICD-10-CM

## 2023-01-17 DIAGNOSIS — F34.1 DYSTHYMIA: ICD-10-CM

## 2023-01-17 DIAGNOSIS — O36.1930 MATERNAL ATYPICAL ANTIBODY AFFECTING PREGNANCY IN THIRD TRIMESTER, SINGLE OR UNSPECIFIED FETUS: ICD-10-CM

## 2023-01-17 DIAGNOSIS — Z98.890 HISTORY OF LOOP ELECTRICAL EXCISION PROCEDURE (LEEP): ICD-10-CM

## 2023-01-17 DIAGNOSIS — Z87.59 HISTORY OF POSTPARTUM DEPRESSION: ICD-10-CM

## 2023-01-17 PROCEDURE — 36415 COLL VENOUS BLD VENIPUNCTURE: CPT | Performed by: OBSTETRICS & GYNECOLOGY

## 2023-01-17 PROCEDURE — 0503F POSTPARTUM CARE VISIT: CPT | Performed by: OBSTETRICS & GYNECOLOGY

## 2023-01-17 NOTE — PROGRESS NOTES
Blood draw from R Antecubital x 1 attempt without difficulty. 0 SST, 0 PST, 1 LAV tubes drawn. Patient tolerated well.  Miquel Guardado LPN

## 2023-01-18 LAB
ABO/RH: NORMAL
ANTIBODY IDENTIFICATION: NORMAL
ANTIBODY SCREEN: NORMAL
C (LITTLE) ANTIGEN: NORMAL
DAT IGG CAPTURE: NORMAL

## 2023-01-19 NOTE — TELEPHONE ENCOUNTER
Pt scheduled for IUD insertion 2/7/23. She says Dr Kali Bowie was ok with 8 weeks post partum per discussion.  Please advise

## 2023-01-24 ENCOUNTER — TELEMEDICINE (OUTPATIENT)
Dept: PSYCHOLOGY | Age: 33
End: 2023-01-24
Payer: COMMERCIAL

## 2023-01-24 DIAGNOSIS — F33.1 MDD (MAJOR DEPRESSIVE DISORDER), RECURRENT EPISODE, MODERATE (HCC): Primary | ICD-10-CM

## 2023-01-24 DIAGNOSIS — F43.20 ADJUSTMENT DISORDER, UNSPECIFIED TYPE: ICD-10-CM

## 2023-01-24 DIAGNOSIS — F41.9 ANXIETY: ICD-10-CM

## 2023-01-24 PROCEDURE — 90834 PSYTX W PT 45 MINUTES: CPT | Performed by: SOCIAL WORKER

## 2023-01-24 NOTE — PATIENT INSTRUCTIONS
We can consider marriage counselor down the road  Go back and review the tamela's  Unlocking Us with indy and jovani lyles on burnout  Challenge with empathy  Brian  Return to see Ross Chacko in 2 weeks.

## 2023-01-24 NOTE — PROGRESS NOTES
Behavioral Health Consultation  Ale Rios. CHILO MontgomeryRAMIRO  1/24/2023  4:39 PM EST      Time spent with Patient: 45 minutes  This is patient's  23rd  [facility] appointment. Reason for Consult:    Chief Complaint   Patient presents with    Anxiety    Depression     Referring Provider: Rolo Moulton DO  [address] [place] [address]    Feedback given to PCP. TELEHEALTH VISIT -- Audio/Visual (During [id number] public health emergency)  }  Pursuant to the emergency declaration under the [address] [place] [address], [facility] waiver authority and the [employer] Act, this Virtual Visit was conducted, with patient's consent, to reduce the patient's risk of exposure to COVID-19 and provide continuity of care for an established patient. Services were provided through a video synchronous discussion virtually to substitute for in-person clinic visit. Pt gave verbal informed consent to participate in telehealth services. Conducted a risk-benefit analysis and determined that the patient's presenting problems are consistent with the use of telepsychology. Determined that the patient has sufficient knowledge and skills in the use of technology enabling them to adequately benefit from telepsychology. It was determined that this patient was able to be properly treated without an in-person session. Patient verified that they were currently located at the [facility] address that was provided during registration.     Verified the following information:  Patient's identification: Yes  Patient location: [facility] [id number]   Patient's call back number: [phone]   Patient's emergency contact's name and number, as well as permission to contact them if needed: Extended Emergency Contact Information  Primary Emergency Contact: SERGEI Yip [address] Phone: [phone]  Relation: Spouse  Secondary Emergency Contact: JoanRizwan ferrer KEVIN  Address: [address] Phone: [phone]  Mobile Phone: [phone]  Relation: Parent     Provider location: [facility] Filter:  Pt had daughter on , struggling with postpartum. Daughter is 10 weeks old. Thriving, breastfeeding and sleeping well. Pt fatigued with  and toddler. Martine Díaz, daughter is 2.5 and thriving. Son is doing well. Oldest daughter just had her birthday in Nov, she did not reach out this year. She has not yet reached out to her, so struggling with this. Did not feel that she was in the right mind space with new baby and the emotions that are connected to it all. Pt is feeling a lot of sadness, has been self injuring herself. Has been hitting herself in the head. Not too hard, but is getting some relief from hitting. No suicidal thoughts. No thoughts of hurting anyone else. Things are really difficult between her and . They recognize they have a lot with raising lids, new baby and busy.  saw her hit self and told her he would take her to inpt which scared her. Pt feeling isolated, has friends and family but when it comes to her marriage doesn't want to talk about the issues with them. Often feeling alone with her feelings. Would like to start seeing [facility] again to work through some of this. Not currently on medication for mental health.         O:  MSE:    Appearance: good hygiene   Attitude: cooperative and friendly  Consciousness: alert  Orientation: oriented to person, place, time, general circumstance  Memory: recent and remote memory intact  Attention/Concentration: intact during session  Psychomotor Activity:normal  Eye Contact: normal  Speech: normal rate and volume, well-articulated  Mood: anxious and depressed  Affect: dysphoric  Perception: within normal limits  Thought Content: within normal limits  Thought Process: logical, coherent and goal-directed  Insight: good  Judgment: intact  Ability to understand instructions: Yes  Ability to respond meaningfully: Yes  Morbid Ideation: no   Suicide Assessment: no suicidal ideation, plan, or intent  Homicidal Ideation: no    History:    Medications:   Current Outpatient Medications   Medication Sig Dispense Refill    benzocaine-menthol (DERMOPLAST) 20-0.5 % AERO spray Apply topically as needed for Pain (Patient not taking: Reported on 12/19/2022) 78 g 0    hydrocortisone 2.5 % cream Apply topically 2 times daily. (Patient not taking: Reported on 12/19/2022) 28 g 0    ibuprofen (ADVIL;MOTRIN) 800 MG tablet Take 1 tablet by mouth every 8 hours as needed for Pain 40 tablet 0    acetaminophen (TYLENOL) 500 MG tablet Take 500 mg by mouth every 6 hours as needed for Pain (Patient not taking: No sig reported)      ondansetron (ZOFRAN-ODT) 8 MG TBDP disintegrating tablet Take 1 tablet by mouth every 8 hours as needed for Nausea or Vomiting (Patient not taking: Reported on 12/19/2022) 30 tablet 0    omeprazole (PRILOSEC) 10 MG delayed release capsule Take 10 mg by mouth daily (Patient not taking: Reported on 12/19/2022)      docusate sodium (COLACE) 100 mg capsule Take 100 mg by mouth 2 times daily (Patient not taking: Reported on 12/19/2022)      Prenatal Vit-DSS-Fe Cbn-FA (PRENATAL AD PO) Take by mouth       No current facility-administered medications for this visit.      Social History:   Social History     Socioeconomic History    Marital status:      Spouse name: Not on file    Number of children: Not on file    Years of education: Not on file    Highest education level: Not on file   Occupational History    Occupation: RN   Tobacco Use    Smoking status: Never    Smokeless tobacco: Never   Vaping Use    Vaping Use: Never used   Substance and Sexual Activity    Alcohol use: Not Currently     Alcohol/week: 0.0 standard drinks    Drug use: No    Sexual activity: Yes     Partners: Male   Other Topics Concern    Not on file   Social History Narrative    Not on file     Social Determinants of Health     Financial Resource Strain: Not on file   Food Insecurity: Not on file   Transportation Needs: Not on file   Physical Activity: Not on file   Stress: Not on file   Social Connections: Not on file   Intimate Partner Violence: Not on file   Housing Stability: Not on file     TOBACCO:   reports that she has never smoked. She has never used smokeless tobacco.  ETOH:   reports that she does not currently use alcohol. Family History:   Family History   Problem Relation Age of Onset    Diabetes Father     High Blood Pressure Father     High Cholesterol Father     Cancer Maternal Aunt         breast    Uterine Cancer Maternal Aunt     Breast Cancer Maternal Aunt     Cancer Maternal Aunt 79        breast    No Known Problems Paternal Grandfather     Heart Attack Paternal Grandmother     Stroke Maternal Grandmother     No Known Problems Maternal Grandfather     Depression Mother     High Blood Pressure Mother     Other Brother         severe seasonal allergies       A:  Pt struggling with mood, struggles with postpartum. Has had some non-suicidal self injurious behavior, which is new. No SI/HI, insight and motivation good. Diagnosis:    1. MDD (major depressive disorder), recurrent episode, moderate ([facility] [address]    2. Anxiety    3.  Adjustment disorder, unspecified type          Diagnosis Date    Abnormal Pap smear of cervix     Hydronephrosis 6/26/2013    Migraine     Mild postpartum depression 6/5/2020    Obesity (BMI 30.0-34.9) 6/16/2014    Renal stone 6/26/2013    Varicella      Plan:  Pt interventions:  Trained in strategies for increasing balanced thinking, Discussed and set plan for behavioral activation, Trained in relaxation strategies, Trained in improving communication skills, Provided education, Discussed self-care (sleep, nutrition, rewarding activities, social support, exercise), Discussed benefits of referral for specialty care, and Supportive techniques    Pt Behavioral Change Plan:   See Pt Instructions      Patient was evaluated through a synchronous (real-time) audio-video encounter. The patient (or guardian if applicable) is aware that this is a billable service, which includes applicable co-pays. This Virtual Visit was conducted with patient's (and/or legal guardian's) consent. The visit was conducted pursuant to the emergency declaration under the [address] [place] [address] authority and the [employer] General Act. Patient identification was verified, and a caregiver was present when appropriate. The patient was located in a state where the provider was licensed to provide care.

## 2023-01-30 ASSESSMENT — ENCOUNTER SYMPTOMS
NAUSEA: 0
CONSTIPATION: 0
VOMITING: 0
ABDOMINAL PAIN: 0
DIARRHEA: 0
SHORTNESS OF BREATH: 0
GASTROINTESTINAL NEGATIVE: 1
RESPIRATORY NEGATIVE: 1

## 2023-01-30 NOTE — PROGRESS NOTES
Subjective:      Patient ID: Gita Munoz is a 34 y.o. female. HPI  33 y/o  female presents for postpartum visit. Patient is 6 weeks status-post  uncomplicated Vaginal Delivery. The pregnancy was complicated by  marginal cord insertion (serial growth scans every 4 weeks), maternal weight, anxiety, first trimester UTI (treated with Keflex), and history of LEEP procedure. Patient continues to do well since delivery. Denies lochia and menses. Denies pain. Denies vaginal discharge. Denies headache, vision changes, and RUQ pain. Denies fever, chills, chest pain, shortness of breath, nausea, vomiting, diarrhea, constipation, dysuria and hematuria. Has noted some mood swings postpartum--has appointment with counselor next Tuesday. Has excellent support at home. Denies suicidal and homicidal ideation. Has been anxious over the antibody found at the time of delivery. Breast feeding. Interested in IUD for contraception. Review of Systems   Constitutional:  Positive for fatigue. Negative for activity change, appetite change, chills, fever and unexpected weight change. Eyes:  Negative for visual disturbance. Respiratory: Negative. Negative for shortness of breath. Cardiovascular: Negative. Negative for chest pain. Gastrointestinal: Negative. Negative for abdominal pain, constipation, diarrhea, nausea and vomiting. Endocrine: Negative for cold intolerance and heat intolerance. Genitourinary:  Negative for difficulty urinating, dysuria, hematuria, pelvic pain, vaginal bleeding, vaginal discharge and vaginal pain. Skin: Negative. Neurological:  Negative for headaches. Hematological:  Does not bruise/bleed easily. Psychiatric/Behavioral:  Negative for dysphoric mood and suicidal ideas. The patient is nervous/anxious. Objective:   Physical Exam  Vitals and nursing note reviewed. Constitutional:       General: She is not in acute distress.      Appearance: Normal appearance. She is well-developed. She is not ill-appearing, toxic-appearing or diaphoretic. Pulmonary:      Effort: Pulmonary effort is normal.   Skin:     General: Skin is warm and dry. Neurological:      Mental Status: She is alert and oriented to person, place, and time. Psychiatric:         Mood and Affect: Mood normal.         Behavior: Behavior normal.         Thought Content: Thought content normal.         Judgment: Judgment normal.       Assessment:       Diagnosis Orders   1. Postpartum care following vaginal delivery        2. Maternal atypical antibody affecting pregnancy in third trimester, single or unspecified fetus  TYPE AND SCREEN      3. Dysthymia        4. History of postpartum depression        5. Lactating mother        10. History of loop electrical excision procedure (LEEP)                Plan:      Orders Placed This Encounter   Procedures    C (Little) Antigen Typing    TYPE AND SCREEN    LENARD IgG    Antibody Identification     Follow up prn IUD insertion.            Natalia Liu DO

## 2023-02-07 ENCOUNTER — POSTPARTUM VISIT (OUTPATIENT)
Dept: OBGYN CLINIC | Age: 33
End: 2023-02-07
Payer: COMMERCIAL

## 2023-02-07 VITALS
DIASTOLIC BLOOD PRESSURE: 80 MMHG | WEIGHT: 206.8 LBS | TEMPERATURE: 97.8 F | SYSTOLIC BLOOD PRESSURE: 118 MMHG | BODY MASS INDEX: 35.5 KG/M2 | HEART RATE: 64 BPM

## 2023-02-07 DIAGNOSIS — Z86.59 HISTORY OF POSTPARTUM DEPRESSION: ICD-10-CM

## 2023-02-07 DIAGNOSIS — Z98.890 HISTORY OF LOOP ELECTRICAL EXCISION PROCEDURE (LEEP): ICD-10-CM

## 2023-02-07 DIAGNOSIS — Z87.59 HISTORY OF POSTPARTUM DEPRESSION: ICD-10-CM

## 2023-02-07 DIAGNOSIS — Z30.430 ENCOUNTER FOR IUD INSERTION: Primary | ICD-10-CM

## 2023-02-07 DIAGNOSIS — Z87.410 HISTORY OF CERVICAL DYSPLASIA: ICD-10-CM

## 2023-02-07 DIAGNOSIS — E66.9 OBESITY (BMI 30-39.9): ICD-10-CM

## 2023-02-07 PROCEDURE — 58300 INSERT INTRAUTERINE DEVICE: CPT | Performed by: OBSTETRICS & GYNECOLOGY

## 2023-02-07 SDOH — ECONOMIC STABILITY: FOOD INSECURITY: WITHIN THE PAST 12 MONTHS, YOU WORRIED THAT YOUR FOOD WOULD RUN OUT BEFORE YOU GOT MONEY TO BUY MORE.: NEVER TRUE

## 2023-02-07 SDOH — ECONOMIC STABILITY: HOUSING INSECURITY
IN THE LAST 12 MONTHS, WAS THERE A TIME WHEN YOU DID NOT HAVE A STEADY PLACE TO SLEEP OR SLEPT IN A SHELTER (INCLUDING NOW)?: NO

## 2023-02-07 SDOH — ECONOMIC STABILITY: TRANSPORTATION INSECURITY
IN THE PAST 12 MONTHS, HAS LACK OF TRANSPORTATION KEPT YOU FROM MEETINGS, WORK, OR FROM GETTING THINGS NEEDED FOR DAILY LIVING?: NO

## 2023-02-07 SDOH — ECONOMIC STABILITY: FOOD INSECURITY: WITHIN THE PAST 12 MONTHS, THE FOOD YOU BOUGHT JUST DIDN'T LAST AND YOU DIDN'T HAVE MONEY TO GET MORE.: NEVER TRUE

## 2023-02-07 SDOH — ECONOMIC STABILITY: INCOME INSECURITY: HOW HARD IS IT FOR YOU TO PAY FOR THE VERY BASICS LIKE FOOD, HOUSING, MEDICAL CARE, AND HEATING?: NOT HARD AT ALL

## 2023-02-08 PROBLEM — O36.1990 MATERNAL ATYPICAL ANTIBODY COMPLICATING PREGNANCY: Status: ACTIVE | Noted: 2022-12-28

## 2023-02-08 PROBLEM — O36.1930 MATERNAL ATYPICAL ANTIBODY AFFECTING PREGNANCY IN THIRD TRIMESTER: Status: RESOLVED | Noted: 2022-12-28 | Resolved: 2023-02-08

## 2023-02-09 ENCOUNTER — TELEMEDICINE (OUTPATIENT)
Dept: PSYCHOLOGY | Age: 33
End: 2023-02-09
Payer: COMMERCIAL

## 2023-02-09 DIAGNOSIS — F33.1 MDD (MAJOR DEPRESSIVE DISORDER), RECURRENT EPISODE, MODERATE (HCC): Primary | ICD-10-CM

## 2023-02-09 DIAGNOSIS — F41.9 ANXIETY: ICD-10-CM

## 2023-02-09 PROCEDURE — 90832 PSYTX W PT 30 MINUTES: CPT | Performed by: SOCIAL WORKER

## 2023-02-09 NOTE — PROGRESS NOTES
Subjective:      Patient ID: Myra Singh is a 34 y.o. female. HPI  33 y/o  female presents for IUD insertion. Patient is 8 weeks status-post  uncomplicated Vaginal Delivery. The pregnancy was complicated by  marginal cord insertion (serial growth scans every 4 weeks), maternal weight, anxiety, first trimester UTI (treated with Keflex), and history of LEEP procedure. Patient continues to do well since delivery. Denies lochia and menses. Denies pain. Denies vaginal discharge. Continues to struggle with moods. Seeing counselor. Has follow up appointment. Has excellent support at home. Denies suicidal and homicidal ideation. Breast feeding. Review of Systems   Constitutional: Negative. Negative for activity change, appetite change, chills, fatigue, fever and unexpected weight change. Genitourinary:  Negative for pelvic pain, vaginal bleeding, vaginal discharge and vaginal pain. Psychiatric/Behavioral:  Positive for dysphoric mood and sleep disturbance. Negative for self-injury and suicidal ideas. The patient is nervous/anxious. Objective:   Physical Exam  Vitals and nursing note reviewed. Exam conducted with a chaperone present. Constitutional:       General: She is not in acute distress. Appearance: Normal appearance. She is well-developed. She is not ill-appearing, toxic-appearing or diaphoretic. Pulmonary:      Effort: Pulmonary effort is normal.   Genitourinary:     General: Normal vulva. Exam position: Lithotomy position. Pubic Area: No rash. Labia:         Right: No rash, tenderness, lesion or injury. Left: No rash, tenderness, lesion or injury. Urethra: No prolapse, urethral pain, urethral swelling or urethral lesion. Vagina: No signs of injury and foreign body. No vaginal discharge, erythema, tenderness, bleeding or lesions. Cervix: No cervical motion tenderness, discharge, lesion or cervical bleeding.       Comments:   IUD Insertion    Procedure:  IUD insertion--Mirena IUD  Written and informed consent--patient signed formal consent as well as  supplied consent. Risks and benefits were discussed. Patient was then taken to exam room and positioned in the dorsal lithotomy position. Speculum was then placed. Cervix was bathed in betadine in usual sterile fashion. Single tooth tenaculum was then applied to the anterior lip of the cervix. The uterus was then sounded to a depth of 8 centimeters. The Mirena IUD was then placed according to  instructions. The applicator was then removed. The IUD string was then cut at a generous length and the single tooth tenaculum was removed. Excellent hemostasis was assured. The speculum was then removed. The patient tolerated the procedure well. Skin:     General: Skin is warm and dry. Neurological:      Mental Status: She is alert and oriented to person, place, and time. Psychiatric:         Mood and Affect: Mood normal.         Behavior: Behavior normal.         Thought Content: Thought content normal.         Judgment: Judgment normal.       Assessment:       Diagnosis Orders   1. Encounter for IUD insertion        2. Obesity (BMI 30-39.9)        3. Lactating mother        4. Postpartum depression        5. History of postpartum depression        6. History of cervical dysplasia        7.  History of loop electrical excision procedure (LEEP)                Plan:      Orders Placed This Encounter   Procedures    83564 - CO INSERT INTRAUTERINE DEVICE     Follow up prn and 4 weeks         Cynthia Smith DO

## 2023-02-09 NOTE — PROGRESS NOTES
Behavioral Health Consultation  Carla ShahSTACY [email or web address]-S  2/9/2023  4:31 PM EST      Time spent with Patient: 30 minutes  This is patient's  24th  [facility] appointment. Reason for Consult:    Chief Complaint   Patient presents with    Anxiety    Depression     Referring Provider: Daniella Granger DO  [address]  [address] [address]  Jayy Wilks    Feedback given to PCP. TELEHEALTH VISIT -- Audio/Visual (During XTBGF-66 public health emergency)  }  Pursuant to the emergency declaration under the [facility] waiver authority and the [employer] and some sugar and [employer] Act, this Virtual Visit was conducted, with patient's consent, to reduce the patient's risk of exposure to COVID-19 and provide continuity of care for an established patient. Services were provided through a video synchronous discussion virtually to substitute for in-person clinic visit. Pt gave verbal informed consent to participate in telehealth services. Conducted a risk-benefit analysis and determined that the patient's presenting problems are consistent with the use of telepsychology. Determined that the patient has sufficient knowledge and skills in the use of technology enabling them to adequately benefit from telepsychology. It was determined that this patient was able to be properly treated without an in-person session. Patient verified that they were currently located at the [facility] address that was provided during registration.     Verified the following information:  Patient's identification: Yes  Patient location: [place] [address]   Patient's call back number: [phone]   Patient's emergency contact's name and number, as well as permission to contact them if needed: Extended Emergency Contact Information  Primary Emergency Contact: SERGEI Joseph [address] Phone: [phone]  Relation: Spouse  Secondary Emergency Contact: Rizwan Emerson  Address: [address] Phone: [phone]  Mobile Phone: [phone]  Relation: Parent     Provider location: Shahnaz Gonzalez:  Pt is doing better overall, she and  are doing better. Had a really nice weekend, felt heard. Listened to the [employer]'s with her  found it to be really helpful. Working through feelings still around adoption.      O:  MSE:    Appearance: good hygiene   Attitude: cooperative and friendly  Consciousness: alert  Orientation: oriented to person, place, time, general circumstance  Memory: recent and remote memory intact  Attention/Concentration: intact during session  Psychomotor Activity:normal  Eye Contact: normal  Speech: normal rate and volume, well-articulated  Mood: anxious and depressed but better  Affect: euthymic  Perception: within normal limits  Thought Content: within normal limits  Thought Process: logical, coherent and goal-directed  Insight: good  Judgment: intact  Ability to understand instructions: Yes  Ability to respond meaningfully: Yes  Morbid Ideation: no   Suicide Assessment: no suicidal ideation, plan, or intent  Homicidal Ideation: no    History:    Medications:   Current Outpatient Medications   Medication Sig Dispense Refill    ibuprofen (ADVIL;MOTRIN) 800 MG tablet Take 1 tablet by mouth every 8 hours as needed for Pain 40 tablet 0    acetaminophen (TYLENOL) 500 MG tablet Take 500 mg by mouth every 6 hours as needed for Pain      Prenatal Vit-DSS-Fe Cbn-FA (PRENATAL AD PO) Take by mouth       Current Facility-Administered Medications   Medication Dose Route Frequency Provider Last Rate Last Admin    levonorgestrel (MIRENA) IUD 52 mg 1 each  1 each IntraUTERine Once Jostin Gold, DO   1 each at 02/07/23 0940     Social History:   Social History     Socioeconomic History    Marital status:  Spouse name: Not on file    Number of children: Not on file    Years of education: Not on file    Highest education level: Not on file   Occupational History    Occupation: RN   Tobacco Use    Smoking status: Never    Smokeless tobacco: Never   Vaping Use    Vaping Use: Never used   Substance and Sexual Activity    Alcohol use: Not Currently     Alcohol/week: 0.0 standard drinks    Drug use: No    Sexual activity: Yes     Partners: Male   Other Topics Concern    Not on file   Social History Narrative    Not on file     Social Determinants of Health     Financial Resource Strain: Not on file   Food Insecurity: Not on file   Transportation Needs: Not on file   Physical Activity: Not on file   Stress: Not on file   Social Connections: Not on file   Intimate Partner Violence: Not on file   Housing Stability: Not on file     TOBACCO:   reports that she has never smoked. She has never used smokeless tobacco.  ETOH:   reports that she does not currently use alcohol. Family History:   Family History   Problem Relation Age of Onset    Diabetes Father     High Blood Pressure Father     High Cholesterol Father     Cancer Maternal Aunt         breast    Uterine Cancer Maternal Aunt     Breast Cancer Maternal Aunt     Cancer Maternal Aunt 79        breast    No Known Problems Paternal Grandfather     Heart Attack Paternal Grandmother     Stroke Maternal Grandmother     No Known Problems Maternal Grandfather     Depression Mother     High Blood Pressure Mother     Other Brother         severe seasonal allergies       A:  Pt working through feelings, managing regret and reframing. No SI/HI, insight and motivation good. Diagnosis:    1. MDD (major depressive disorder), recurrent episode, moderate ([address]    2.  Anxiety          Diagnosis Date    Abnormal Pap smear of cervix     Hydronephrosis 6/26/2013    Migraine     Mild postpartum depression 6/5/2020    Obesity (BMI 30.0-34.9) 6/16/2014    Renal stone 6/26/2013 Varicella      Plan:  Pt interventions:  Trained in strategies for increasing balanced thinking, Discussed and set plan for behavioral activation, Trained in relaxation strategies, Provided education, Discussed self-care (sleep, nutrition, rewarding activities, social support, exercise), Discussed benefits of referral for specialty care, Supportive techniques, and Identified maladaptive thoughts    Pt Behavioral Change Plan:   See Pt Instructions      Patient was evaluated through a synchronous (real-time) audio-video encounter. The patient (or guardian if applicable) is aware that this is a billable service, which includes applicable co-pays. This Virtual Visit was conducted with patient's (and/or legal guardian's) consent. The visit was conducted pursuant to the emergency declaration under the [address] [place] [address] waiver authority and the [employer] General Act. Patient identification was verified, and a caregiver was present when appropriate. The patient was located in a state where the provider was licensed to provide care.

## 2023-02-27 ENCOUNTER — TELEMEDICINE (OUTPATIENT)
Dept: PSYCHOLOGY | Age: 33
End: 2023-02-27
Payer: COMMERCIAL

## 2023-02-27 DIAGNOSIS — F33.1 MDD (MAJOR DEPRESSIVE DISORDER), RECURRENT EPISODE, MODERATE (HCC): Primary | ICD-10-CM

## 2023-02-27 DIAGNOSIS — F41.9 ANXIETY: ICD-10-CM

## 2023-02-27 PROCEDURE — 90834 PSYTX W PT 45 MINUTES: CPT | Performed by: SOCIAL WORKER

## 2023-02-27 NOTE — PATIENT INSTRUCTIONS
Happiness Lab: Don't accentuate the positives (WOOP)   Unlocking Us; Living Big 1 and 2  Return to see Pérez Ellis in 3 weeks.

## 2023-02-27 NOTE — PROGRESS NOTES
Behavioral Health Consultation  OLIVIA Greco  2/27/2023  4:30 PM EST      Time spent with Patient: 45 minutes  This is patient's  25th  [facility] appointment.    Reason for Consult:    Chief Complaint   Patient presents with    Anxiety    Depression     Referring Provider: CONNIE Fontenot  [address]  [address]  [place] [address]    Feedback given to PCP.    TELEHEALTH VISIT -- Audio/Visual (During COVID-19 public health emergency)  }  Pursuant to the emergency declaration under the [place] Act and the National Emergencies Act, 1135 waiver authority and the Coronavirus Preparedness and Response Supplemental Appropriations Act, this Virtual Visit was conducted, with patient's consent, to reduce the patient's risk of exposure to COVID-19 and provide continuity of care for an established patient. Services were provided through a video synchronous discussion virtually to substitute for in-person clinic visit. Pt gave verbal informed consent to participate in telehealth services.     Conducted a risk-benefit analysis and determined that the patient's presenting problems are consistent with the use of telepsychology. Determined that the patient has sufficient knowledge and skills in the use of technology enabling them to adequately benefit from telepsychology. It was determined that this patient was able to be properly treated without an in-person session. Patient verified that they were currently located at the [place] address that was provided during registration.    Verified the following information:  Patient's identification: Yes  Patient location: [address] [place] [address]  [place] [address]   Patient's call back number: [phone]   Patient's emergency contact's name and number, as well as permission to contact them if needed: Extended Emergency Contact Information  Primary Emergency Contact: Giovanny Reese, [facility]  Home Phone: [phone]  Relation:  Spouse  Secondary Emergency Contact: Rizwan Emerson  Address: [address] Phone: [phone]  Mobile Phone: [phone]  Relation: Parent     Provider location: Shelley Blair:  Patient endorses mood overall has improved, relationship with her  has been better. Having some anxiety over traveling to a wedding this weekend with her 1month-old baby, and concerns about breast-feeding as well as pumping.  is showing an interest in potentially joining her in some therapy, and she is planning to have them listen to some of the Gottman's during travel time.      O:  MSE:    Appearance: good hygiene   Attitude: cooperative and friendly  Consciousness: alert  Orientation: oriented to person, place, time, general circumstance  Memory: recent and remote memory intact  Attention/Concentration: intact during session  Psychomotor Activity:normal  Eye Contact: normal  Speech: normal rate and volume, well-articulated  Mood: anxious  Affect: anxious  Perception: within normal limits  Thought Content: within normal limits  Thought Process: logical, coherent and goal-directed  Insight: good  Judgment: intact  Ability to understand instructions: Yes  Ability to respond meaningfully: Yes  Morbid Ideation: no   Suicide Assessment: no suicidal ideation, plan, or intent  Homicidal Ideation: no    History:    Medications:   Current Outpatient Medications   Medication Sig Dispense Refill    ibuprofen (ADVIL;MOTRIN) 800 MG tablet Take 1 tablet by mouth every 8 hours as needed for Pain 40 tablet 0    acetaminophen (TYLENOL) 500 MG tablet Take 500 mg by mouth every 6 hours as needed for Pain      Prenatal Vit-DSS-Fe Cbn-FA (PRENATAL AD PO) Take by mouth       Current Facility-Administered Medications   Medication Dose Route Frequency Provider Last Rate Last Admin    levonorgestrel (MIRENA) IUD 52 mg 1 each  1 each IntraUTERine Once Leighann Keene DO   1 each at 02/07/23 0903     Social History:   Social History     Socioeconomic History    Marital status:      Spouse name: Not on file    Number of children: Not on file    Years of education: Not on file    Highest education level: Not on file   Occupational History    Occupation: RN   Tobacco Use    Smoking status: Never    Smokeless tobacco: Never   Vaping Use    Vaping Use: Never used   Substance and Sexual Activity    Alcohol use: Not Currently     Alcohol/week: 0.0 standard drinks    Drug use: No    Sexual activity: Yes     Partners: Male   Other Topics Concern    Not on file   Social History Narrative    Not on file     Social Determinants of Health     Financial Resource Strain: Not on file   Food Insecurity: Not on file   Transportation Needs: Not on file   Physical Activity: Not on file   Stress: Not on file   Social Connections: Not on file   Intimate Partner Violence: Not on file   Housing Stability: Not on file     TOBACCO:   reports that she has never smoked. She has never used smokeless tobacco.  ETOH:   reports that she does not currently use alcohol. Family History:   Family History   Problem Relation Age of Onset    Diabetes Father     High Blood Pressure Father     High Cholesterol Father     Cancer Maternal Aunt         breast    Uterine Cancer Maternal Aunt     Breast Cancer Maternal Aunt     Cancer Maternal Aunt 79        breast    No Known Problems Paternal Grandfather     Heart Attack Paternal Grandmother     Stroke Maternal Grandmother     No Known Problems Maternal Grandfather     Depression Mother     High Blood Pressure Mother     Other Brother         severe seasonal allergies       A:  Patient endorses some improvement with mood, relationship with  is getting better. Some anxiety with upcoming travel, managing better overall. No SI/HI, insight and motivation good. Diagnosis:    1. MDD (major depressive disorder), recurrent episode, moderate ([address]    2. Anxiety          Diagnosis Date    Abnormal Pap smear of cervix     Hydronephrosis 6/26/2013    Migraine     Mild postpartum depression 6/5/2020    Obesity (BMI 30.0-34.9) 6/16/2014    Renal stone 6/26/2013    Varicella      Plan:  Pt interventions:  Trained in strategies for increasing balanced thinking, Discussed and set plan for behavioral activation, Trained in relaxation strategies, Trained in improving communication skills, Provided education, Discussed self-care (sleep, nutrition, rewarding activities, social support, exercise), Supportive techniques, and Emphasized self-care as important for managing overall health    Pt Behavioral Change Plan:   See Pt Instructions      Patient was evaluated through a synchronous (real-time) audio-video encounter. The patient (or guardian if applicable) is aware that this is a billable service, which includes applicable co-pays. This Virtual Visit was conducted with patient's (and/or legal guardian's) consent. The visit was conducted pursuant to the emergency declaration under the [address] [place] [address] waiver authority and the [facility] and [employer] General Act. Patient identification was verified, and a caregiver was present when appropriate. The patient was located in a state where the provider was licensed to provide care.

## 2023-03-08 ENCOUNTER — OFFICE VISIT (OUTPATIENT)
Dept: OBGYN CLINIC | Age: 33
End: 2023-03-08
Payer: COMMERCIAL

## 2023-03-08 VITALS
TEMPERATURE: 97.4 F | HEART RATE: 79 BPM | SYSTOLIC BLOOD PRESSURE: 116 MMHG | BODY MASS INDEX: 34.74 KG/M2 | DIASTOLIC BLOOD PRESSURE: 78 MMHG | WEIGHT: 202.4 LBS

## 2023-03-08 DIAGNOSIS — Z98.890 HISTORY OF LOOP ELECTRICAL EXCISION PROCEDURE (LEEP): ICD-10-CM

## 2023-03-08 DIAGNOSIS — Z86.59 HISTORY OF POSTPARTUM DEPRESSION: ICD-10-CM

## 2023-03-08 DIAGNOSIS — N93.9 ABNORMAL UTERINE BLEEDING: Primary | ICD-10-CM

## 2023-03-08 DIAGNOSIS — E66.9 OBESITY (BMI 30-39.9): ICD-10-CM

## 2023-03-08 DIAGNOSIS — Z97.5 IUD (INTRAUTERINE DEVICE) IN PLACE: ICD-10-CM

## 2023-03-08 DIAGNOSIS — F34.1 DYSTHYMIA: ICD-10-CM

## 2023-03-08 DIAGNOSIS — Z87.410 HISTORY OF CERVICAL DYSPLASIA: ICD-10-CM

## 2023-03-08 DIAGNOSIS — Z87.59 HISTORY OF POSTPARTUM DEPRESSION: ICD-10-CM

## 2023-03-08 PROCEDURE — 99212 OFFICE O/P EST SF 10 MIN: CPT | Performed by: OBSTETRICS & GYNECOLOGY

## 2023-03-08 NOTE — PROGRESS NOTES
Subjective:      Patient ID: Myra Singh is a 28 y.o. female. HPI  27 y/o  female presents for ultrasound and follow up secondary to irregular bleeding and recent IUD insertion. Patient was last seen on 23 for IUD insertion. Since that time has noted light to moderate constant bleeding. Bleeding improved after 2 weeks. Denies bleeding in the past week. Patient is 3 months status-post  uncomplicated Vaginal Delivery. The pregnancy was complicated by  marginal cord insertion (serial growth scans every 4 weeks), maternal weight, anxiety, first trimester UTI (treated with Keflex), and history of LEEP procedure. Continues to struggle with moods. Seeing counselor. Has excellent support at home. Denies suicidal and homicidal ideation. Breast feeding. Last pap smear was 21--normal.     Review of Systems   Constitutional: Negative. Negative for activity change, appetite change, chills, fatigue, fever and unexpected weight change. Respiratory:  Negative for shortness of breath. Cardiovascular:  Negative for chest pain. Gastrointestinal:  Negative for abdominal distention, abdominal pain, constipation, diarrhea, nausea and vomiting. Genitourinary:  Positive for menstrual problem and vaginal bleeding. Negative for difficulty urinating, dysuria, hematuria, pelvic pain, vaginal discharge and vaginal pain. Psychiatric/Behavioral: Negative. Objective:   Physical Exam  Vitals and nursing note reviewed. Constitutional:       General: She is not in acute distress. Appearance: Normal appearance. She is well-developed. She is not ill-appearing, toxic-appearing or diaphoretic. HENT:      Head: Normocephalic and atraumatic. Pulmonary:      Effort: Pulmonary effort is normal.   Skin:     General: Skin is warm and dry. Neurological:      Mental Status: She is alert and oriented to person, place, and time.    Psychiatric:         Mood and Affect: Mood normal.

## 2023-03-23 ASSESSMENT — ENCOUNTER SYMPTOMS
DIARRHEA: 0
CONSTIPATION: 0
ABDOMINAL DISTENTION: 0
SHORTNESS OF BREATH: 0
NAUSEA: 0
VOMITING: 0
ABDOMINAL PAIN: 0

## 2023-05-17 ENCOUNTER — OFFICE VISIT (OUTPATIENT)
Dept: PSYCHOLOGY | Age: 33
End: 2023-05-17
Payer: COMMERCIAL

## 2023-05-17 DIAGNOSIS — F41.9 ANXIETY: ICD-10-CM

## 2023-05-17 DIAGNOSIS — F33.41 MDD (MAJOR DEPRESSIVE DISORDER), RECURRENT, IN PARTIAL REMISSION (HCC): Primary | ICD-10-CM

## 2023-05-17 PROCEDURE — 90834 PSYTX W PT 45 MINUTES: CPT | Performed by: SOCIAL WORKER

## 2023-05-17 NOTE — PATIENT INSTRUCTIONS
[facility] Lab: Dr Iván Medrano, intermittent fasting, caffeine, and marijuana   Return to see Tracy Roger in 4 weeks.

## 2023-05-17 NOTE — PROGRESS NOTES
Behavioral Health Consultation  Ramez Shah. Damien Espitia  5/17/2023  7:53 AM      Time spent with Patient: 45 minutes  This is patient's  26th   [facility] appointment. Reason for Consult:  depression and anxiety  Referring Provider: Connor Griffin, [address]  [place],  [address]    Pt provided informed consent for the behavioral health program. Discussed with patient model of service to include the limits of confidentiality (i.e. abuse reporting, suicide intervention, etc.) and short-term intervention focused approach. Pt indicated understanding. Feedback given to PCP. S:  Patient endorses doing well overall, would like to jc in and focus on her health. Consider adding youngest is starting to get a better sleep schedule which has been helpful and she is able to get more rest during the nighttime and not awoken as much. Coming to an end in the next month or so with some slight baseball which will be a relief. She and  are doing well they continue to thrive. Still have the moments but working through things very well. Patient would like to schedule an appoint with OB to discuss hormones as she feels like this may be affecting mood.     O:  MSE:    Appearance: good hygiene   Attitude: cooperative and friendly  Consciousness: alert  Orientation: oriented to person, place, time, general circumstance  Memory: recent and remote memory intact  Attention/Concentration: intact during session  Psychomotor Activity:normal  Eye Contact: normal  Speech: normal rate and volume, well-articulated  Mood: anxious and depressed but better  Affect: euthymic  Perception: within normal limits  Thought Content: within normal limits  Thought Process: logical, coherent and goal-directed  Insight: good  Judgment: intact  Ability to understand instructions: Yes  Ability to respond meaningfully: Yes  Morbid Ideation: no   Suicide Assessment: no suicidal ideation, plan, or intent  Homicidal Ideation:

## 2023-08-21 ENCOUNTER — TELEPHONE (OUTPATIENT)
Dept: FAMILY MEDICINE CLINIC | Age: 33
End: 2023-08-21

## 2023-09-29 ASSESSMENT — PATIENT HEALTH QUESTIONNAIRE - PHQ9
SUM OF ALL RESPONSES TO PHQ QUESTIONS 1-9: 8
SUM OF ALL RESPONSES TO PHQ QUESTIONS 1-9: 8
8. MOVING OR SPEAKING SO SLOWLY THAT OTHER PEOPLE COULD HAVE NOTICED. OR THE OPPOSITE - BEING SO FIDGETY OR RESTLESS THAT YOU HAVE BEEN MOVING AROUND A LOT MORE THAN USUAL: NOT AT ALL
SUM OF ALL RESPONSES TO PHQ9 QUESTIONS 1 & 2: 2
9. THOUGHTS THAT YOU WOULD BE BETTER OFF DEAD, OR OF HURTING YOURSELF: NOT AT ALL
3. TROUBLE FALLING OR STAYING ASLEEP: NOT AT ALL
2. FEELING DOWN, DEPRESSED OR HOPELESS: 1
2. FEELING DOWN, DEPRESSED OR HOPELESS: SEVERAL DAYS
SUM OF ALL RESPONSES TO PHQ QUESTIONS 1-9: 8
6. FEELING BAD ABOUT YOURSELF - OR THAT YOU ARE A FAILURE OR HAVE LET YOURSELF OR YOUR FAMILY DOWN: 1
SUM OF ALL RESPONSES TO PHQ QUESTIONS 1-9: 8
3. TROUBLE FALLING OR STAYING ASLEEP: 0
4. FEELING TIRED OR HAVING LITTLE ENERGY: MORE THAN HALF THE DAYS
7. TROUBLE CONCENTRATING ON THINGS, SUCH AS READING THE NEWSPAPER OR WATCHING TELEVISION: 1
8. MOVING OR SPEAKING SO SLOWLY THAT OTHER PEOPLE COULD HAVE NOTICED. OR THE OPPOSITE, BEING SO FIGETY OR RESTLESS THAT YOU HAVE BEEN MOVING AROUND A LOT MORE THAN USUAL: 0
10. IF YOU CHECKED OFF ANY PROBLEMS, HOW DIFFICULT HAVE THESE PROBLEMS MADE IT FOR YOU TO DO YOUR WORK, TAKE CARE OF THINGS AT HOME, OR GET ALONG WITH OTHER PEOPLE: SOMEWHAT DIFFICULT
9. THOUGHTS THAT YOU WOULD BE BETTER OFF DEAD, OR OF HURTING YOURSELF: 0
10. IF YOU CHECKED OFF ANY PROBLEMS, HOW DIFFICULT HAVE THESE PROBLEMS MADE IT FOR YOU TO DO YOUR WORK, TAKE CARE OF THINGS AT HOME, OR GET ALONG WITH OTHER PEOPLE: 1
5. POOR APPETITE OR OVEREATING: MORE THAN HALF THE DAYS
SUM OF ALL RESPONSES TO PHQ QUESTIONS 1-9: 8
5. POOR APPETITE OR OVEREATING: 2
1. LITTLE INTEREST OR PLEASURE IN DOING THINGS: 1
7. TROUBLE CONCENTRATING ON THINGS, SUCH AS READING THE NEWSPAPER OR WATCHING TELEVISION: SEVERAL DAYS
1. LITTLE INTEREST OR PLEASURE IN DOING THINGS: SEVERAL DAYS
6. FEELING BAD ABOUT YOURSELF - OR THAT YOU ARE A FAILURE OR HAVE LET YOURSELF OR YOUR FAMILY DOWN: SEVERAL DAYS
4. FEELING TIRED OR HAVING LITTLE ENERGY: 2

## 2023-10-02 ENCOUNTER — OFFICE VISIT (OUTPATIENT)
Dept: OBGYN CLINIC | Age: 33
End: 2023-10-02

## 2023-10-02 VITALS
DIASTOLIC BLOOD PRESSURE: 68 MMHG | HEART RATE: 74 BPM | TEMPERATURE: 97.5 F | SYSTOLIC BLOOD PRESSURE: 110 MMHG | HEIGHT: 64 IN | BODY MASS INDEX: 36.91 KG/M2 | WEIGHT: 216.2 LBS

## 2023-10-02 DIAGNOSIS — Z87.59 HISTORY OF POSTPARTUM DEPRESSION: ICD-10-CM

## 2023-10-02 DIAGNOSIS — Z86.59 HISTORY OF POSTPARTUM DEPRESSION: ICD-10-CM

## 2023-10-02 DIAGNOSIS — Z87.410 HISTORY OF CERVICAL DYSPLASIA: ICD-10-CM

## 2023-10-02 DIAGNOSIS — Z98.890 HISTORY OF LOOP ELECTRICAL EXCISION PROCEDURE (LEEP): ICD-10-CM

## 2023-10-02 DIAGNOSIS — Z30.09 GENERAL COUNSELING AND ADVICE FOR CONTRACEPTIVE MANAGEMENT: Primary | ICD-10-CM

## 2023-10-02 DIAGNOSIS — E66.9 OBESITY (BMI 30-39.9): ICD-10-CM

## 2023-10-02 DIAGNOSIS — Z97.5 IUD (INTRAUTERINE DEVICE) IN PLACE: ICD-10-CM

## 2023-10-02 RX ORDER — PYRIDOXINE HCL (VITAMIN B6) 100 MG
TABLET ORAL
COMMUNITY

## 2023-10-02 RX ORDER — CHOLECALCIFEROL (VITAMIN D3) 125 MCG
CAPSULE ORAL
COMMUNITY

## 2023-10-02 RX ORDER — TRANEXAMIC ACID 650 MG/1
1300 TABLET ORAL 3 TIMES DAILY
Qty: 30 TABLET | Refills: 5 | Status: SHIPPED | OUTPATIENT
Start: 2023-10-02 | End: 2023-10-07

## 2023-10-02 RX ORDER — AMOXICILLIN 250 MG
CAPSULE ORAL
COMMUNITY

## 2023-10-21 PROBLEM — Z37.9 NORMAL LABOR: Status: RESOLVED | Noted: 2022-12-08 | Resolved: 2023-10-21

## 2023-10-21 ASSESSMENT — ENCOUNTER SYMPTOMS
ABDOMINAL DISTENTION: 0
NAUSEA: 0
ABDOMINAL PAIN: 0
CONSTIPATION: 0
SHORTNESS OF BREATH: 0

## 2024-01-01 NOTE — PATIENT INSTRUCTIONS
1.  Continue working with [employer]  2. Long to [address] [facility],[address]  3. Rafaela Rockwell [employer] on FB  4. Return to see Jacob Cooney in 6 weeks. Statement Selected

## 2024-04-24 ENCOUNTER — OFFICE VISIT (OUTPATIENT)
Dept: OBGYN CLINIC | Age: 34
End: 2024-04-24
Payer: COMMERCIAL

## 2024-04-24 VITALS
BODY MASS INDEX: 38.76 KG/M2 | TEMPERATURE: 98 F | OXYGEN SATURATION: 98 % | HEART RATE: 77 BPM | HEIGHT: 64 IN | SYSTOLIC BLOOD PRESSURE: 118 MMHG | DIASTOLIC BLOOD PRESSURE: 69 MMHG | WEIGHT: 227 LBS

## 2024-04-24 DIAGNOSIS — E66.9 OBESITY (BMI 30-39.9): ICD-10-CM

## 2024-04-24 DIAGNOSIS — Z30.432 ENCOUNTER FOR IUD REMOVAL: Primary | ICD-10-CM

## 2024-04-24 DIAGNOSIS — Z98.890 HISTORY OF LOOP ELECTRICAL EXCISION PROCEDURE (LEEP): ICD-10-CM

## 2024-04-24 DIAGNOSIS — Z87.410 HISTORY OF CERVICAL DYSPLASIA: ICD-10-CM

## 2024-04-24 PROCEDURE — 99213 OFFICE O/P EST LOW 20 MIN: CPT | Performed by: OBSTETRICS & GYNECOLOGY

## 2024-04-24 PROCEDURE — 58301 REMOVE INTRAUTERINE DEVICE: CPT | Performed by: OBSTETRICS & GYNECOLOGY

## 2024-05-06 ASSESSMENT — ENCOUNTER SYMPTOMS
ABDOMINAL DISTENTION: 0
NAUSEA: 0
CONSTIPATION: 0
DIARRHEA: 0
ABDOMINAL PAIN: 0
SHORTNESS OF BREATH: 0
VOMITING: 0

## 2024-05-06 NOTE — PROGRESS NOTES
Subjective   Patient ID: Promise Reese is a 34 y.o. female.    HPI  33 y/o  female presents  for IUD removal.   IUD was inserted on 23.  Menses occur every month, very heavy days 1-2 and moderate on days 3-4.  ADLs are significantly affected by menses.  Experiences 6 total days of cramps.  Symptoms are not as bad as prior to IUD.   Patient is 16 months status-post  uncomplicated Vaginal Delivery. The pregnancy was complicated by  marginal cord insertion (serial growth scans every 4 weeks), maternal weight, anxiety, first trimester UTI (treated with Keflex), and history of LEEP procedure.   Last pap smear was 21--normal.  Tested negative for high risk HPV in 2019   has had vasectomy-- has been cleared since procedure.    Family history is positive for breast cancer in 2 maternal aunts (one aunt was also diagnosed with uterine cancer.    Review of Systems   Constitutional: Negative.  Negative for activity change, appetite change, chills, fatigue, fever and unexpected weight change.   Respiratory:  Negative for shortness of breath.    Cardiovascular:  Negative for chest pain.   Gastrointestinal:  Negative for abdominal distention, abdominal pain, constipation, diarrhea, nausea and vomiting.   Genitourinary:  Negative for difficulty urinating, dysuria, hematuria, menstrual problem, pelvic pain, vaginal bleeding, vaginal discharge and vaginal pain.   Psychiatric/Behavioral: Negative.            Objective   Physical Exam  Vitals and nursing note reviewed. Exam conducted with a chaperone present.   Constitutional:       General: She is not in acute distress.     Appearance: Normal appearance. She is well-developed. She is not ill-appearing, toxic-appearing or diaphoretic.   Pulmonary:      Effort: Pulmonary effort is normal.   Abdominal:      Hernia: There is no hernia in the left inguinal area or right inguinal area.   Genitourinary:     General: Normal vulva.      Exam position:

## 2025-03-24 NOTE — DISCHARGE INSTRUCTIONS
Feeding and Nutrition: Leading a healthy lifestyle, eating plenty of fruits and vegetables, eating whole grain foods, minimizing junk food and exercising regularly is important for cardiac health.  Medications:  No new medications.  Medications to avoid:  None.   Diagnostic tests:  No further cardiac laboratory tests or imaging required at this time.  SBE prophylaxis:  Not required.  Immunizations:  Routine immunizations should be provided, including influenza for patients over 6 months of age.   Exercise restrictions:  There should be no restriction placed on the activity from a cardiac standpoint. Ian is clear for participation in all activities from a cardiac viewpoint, and needs no special cardiac precautions.   Surgical/Anesthesia Concerns:  Based on the available history and data, the patient is at no greater risk than the general population for surgery, anesthesia, or sedation.  Patient education:  To contact us for any new, concerning, or recurrent symptoms.  Follow up:  A return visit to cardiology clinic is not required, unless new or concerning symptoms develop.  Routine follow up with the primary doctor is recommended.   Follow up with your OB doctor in 2 week for a  delivery or in 6 weeks for a vaginal delivery unless otherwise instructed, call the office for appointment. .  For breastfeeding support, you can contact our lactation specialists at [phone] or   [phone]. DIET  Eat a well balanced diet focusing on foods high in fiber and protein  Drink plenty of fluids especially water. To avoid constipation you may take a mild stool softener as recommended by your doctor or midwife. ACTIVITY  Gradually increase your activity. Resume exercise regimen only after advised by your doctor or midwife. Avoid lifting anything heavier than your baby or a gallon of milk until OK'd by your physician or midlife. Avoid driving until your doctor or midwife has given their approval.  Clotilde Schwab slowly from a lying to sitting and then a standing position. Climb stairs one at a time. Use caution when carrying your baby up and down the stairs. No sexual activity for 6 weeks or until advised by your doctor - Nothing in vagina: intercourse, tampons, or douching. Be prepared to discuss family planning at your follow-up OB visit. You may feel tired or have a lack of energy. You may continue your prenatal vitamin to replenish nutrients post delivery. Nap when infant naps to catch up on sleep. May return to work or school in 6 weeks or as directed by physician or midlife. Take pain medication as prescribed whenever you need them  Take care of yourself by sleeping/resting as much as possible. Let someone else care for you, your infant and housework as much as possible for a while. EMOTIONS  You may feed ramírez, sad, teary, & overwhelmed. If infant will not stop crying, contact another adult for help or place infant in their crib on their back and take a break. NEVER shake your infant.     Call your doctor if you have unrelieved feelings of: inability to cope, anxiety, lack of interest in the infant/family, eating and sleeping disturbances,     BLEEDING  Vaginal bleeding will decrease in amount over the next few weeks. It should be like the end of your period like a brownish discharge. No different odor or color than a regular period. You will notice that as your activity increases, your flow may increase. This is your body's way of telling you, you need to take things easier and rest more often. Abdominal cramping should not get any worse than it is now. Take your pain medications as needed for the next few days. BREAST CARE  For breastfeeding moms:  If you become engorged, feeding may be more difficult or painful for 1-2 days. You may find it helpful to hand express some milk so that the infant can latch on more easily. While breastfeeding, continue to take your prenatal vitamins as directed by your doctor or midwife. Only take medications verified as safe for breastfeeding. If you start any new medications other than the ones you have had in the hospital, contact your pediatrician to see if they are safe for breastfeeding. Wear a support bra 24/7. You can pump your milk after breast feeding and freeze milk for six months in the freezer. When you are ready to use it, thaw it out in the refrigerator and use in 24 hours. Label the containers made for breast milk with day and time of pumping. Establish breast feeding for 2 weeks before you pump breast milk to save. Use your lanolin ointment/cream on your nipples after baby nurses. You need not wipe it off when baby nurses again. There are nipple shields and disks for sore nipples. Babies should eat every two to three hours. Avoid gassy foods (cabbage, onions, saurkraut, baked beans, cauliflower, broccoli) and spicy [place] or [place] foods. They might give the baby gas or make your milk taste funny to baby.  But if you have a craving, eat the food and see if it affects the baby and if it does, delete touch.  You are passing blood clots bigger than the size of a lemon. If you are experiencing extreme weakness or dizziness. If you are having flu-like symptoms such as achy muscles or joints. If you have pain that cannot be relieved. You have persistent burning with urination or frequency. You have swelling, bleeding, drainage, foul odor, redness, or warmth in/around your incision or stitches. You have a red, warm, tender area in you calf. Call if you have concerns about your well-being or if you feel you may be showing signs of post partum depression, or have thoughts of harming yourself or infant. Increased pain at the site of the episiotomy. Difficulty urinating. Difficulty breathing with or without chest pain. Headache not relieved by pain meds. If you are saturating more than one maxi pad in an hour, foul smelling vaginal discharge, sudden continuing increased vaginal bleeding with or without clots. If you develop a warm, red, tender area on your breast, have nipple discharge which is foul smelling or contains pus, or develop a fever. NEVER SHAKE A BABY PROMISE. Shaking can kill a baby. It can also cause seizures, brain damage, learning problems,  Cerebral palsy, blindness and other serious health and developmental problems. I PROMISE NEVER TO SHAKE MY BABY    I understand that caregivers other than the mother often shakes babies. I also promise to discuss the dangers of shaking a baby with everyone who takes care of my baby. I promise to tell anyone who care for my baby to never, never shake my baby. yes

## 2025-04-08 PROBLEM — N92.1 MENOMETRORRHAGIA: Status: ACTIVE | Noted: 2025-04-08

## 2025-04-08 PROBLEM — O36.1990 MATERNAL ATYPICAL ANTIBODY COMPLICATING PREGNANCY: Status: RESOLVED | Noted: 2022-12-28 | Resolved: 2025-04-08

## 2025-04-09 ENCOUNTER — RESULTS FOLLOW-UP (OUTPATIENT)
Dept: OBGYN CLINIC | Age: 35
End: 2025-04-09